# Patient Record
Sex: MALE | Race: WHITE
[De-identification: names, ages, dates, MRNs, and addresses within clinical notes are randomized per-mention and may not be internally consistent; named-entity substitution may affect disease eponyms.]

---

## 2017-02-28 ENCOUNTER — HOSPITAL ENCOUNTER (OUTPATIENT)
Dept: HOSPITAL 45 - C.RADPV | Age: 82
Discharge: HOME | End: 2017-02-28
Attending: NURSE PRACTITIONER
Payer: COMMERCIAL

## 2017-02-28 DIAGNOSIS — I51.7: ICD-10-CM

## 2017-02-28 DIAGNOSIS — R50.9: Primary | ICD-10-CM

## 2017-02-28 DIAGNOSIS — J90: ICD-10-CM

## 2017-02-28 NOTE — DIAGNOSTIC IMAGING REPORT
TWO VIEW CHEST



CLINICAL HISTORY: Fever.



FINDINGS: PA and lateral chest radiographs are compared to study dated 3/2/2009

and correlated to chest CT dated 9/22/2011. The PA view is degraded by patient

rotation. A tracheostomy has been removed as compared to 2009. The heart is

enlarged and there is atherosclerotic calcification of the thoracic aorta. The

pulmonary vasculature is noncongested.  Chronic interstitial thickening is

similar to previous. There are small pleural effusions and bibasilar airspace

opacities. There is no pneumothorax. The skeletal structures are osteopenic.

Degenerative change and hyperkyphosis are noted in the thoracic spine.



IMPRESSION:



1. Cardiomegaly without radiographic evidence of congestive failure.



2. Small pleural effusions with bibasilar airspace opacities. This could

represent atelectasis and/or an infectious/inflammatory pneumonitis. Clinical

correlation will be required.







Electronically signed by:  Agustin Sabillon M.D.

2/28/2017 1:55 PM



Dictated Date/Time:  2/28/2017 1:43 PM

## 2017-03-13 ENCOUNTER — HOSPITAL ENCOUNTER (INPATIENT)
Dept: HOSPITAL 45 - C.EDB | Age: 82
LOS: 3 days | Discharge: HOME | DRG: 291 | End: 2017-03-16
Attending: FAMILY MEDICINE | Admitting: HOSPITALIST
Payer: COMMERCIAL

## 2017-03-13 VITALS
HEIGHT: 67 IN | WEIGHT: 187.83 LBS | WEIGHT: 187.83 LBS | BODY MASS INDEX: 29.48 KG/M2 | BODY MASS INDEX: 29.48 KG/M2 | HEIGHT: 67 IN

## 2017-03-13 VITALS
TEMPERATURE: 98.6 F | DIASTOLIC BLOOD PRESSURE: 82 MMHG | SYSTOLIC BLOOD PRESSURE: 148 MMHG | HEART RATE: 59 BPM | OXYGEN SATURATION: 95 %

## 2017-03-13 VITALS — HEART RATE: 50 BPM | OXYGEN SATURATION: 91 %

## 2017-03-13 DIAGNOSIS — I27.2: ICD-10-CM

## 2017-03-13 DIAGNOSIS — Z91.19: ICD-10-CM

## 2017-03-13 DIAGNOSIS — N40.0: ICD-10-CM

## 2017-03-13 DIAGNOSIS — Z79.891: ICD-10-CM

## 2017-03-13 DIAGNOSIS — I48.2: ICD-10-CM

## 2017-03-13 DIAGNOSIS — Z86.19: ICD-10-CM

## 2017-03-13 DIAGNOSIS — I48.92: ICD-10-CM

## 2017-03-13 DIAGNOSIS — Z79.899: ICD-10-CM

## 2017-03-13 DIAGNOSIS — B35.4: ICD-10-CM

## 2017-03-13 DIAGNOSIS — I35.1: ICD-10-CM

## 2017-03-13 DIAGNOSIS — I31.3: ICD-10-CM

## 2017-03-13 DIAGNOSIS — J18.9: ICD-10-CM

## 2017-03-13 DIAGNOSIS — I24.8: ICD-10-CM

## 2017-03-13 DIAGNOSIS — I50.43: ICD-10-CM

## 2017-03-13 DIAGNOSIS — R00.1: ICD-10-CM

## 2017-03-13 DIAGNOSIS — I11.0: Primary | ICD-10-CM

## 2017-03-13 DIAGNOSIS — Q21.1: ICD-10-CM

## 2017-03-13 LAB
ALBUMIN/GLOB SERPL: 1.2 {RATIO} (ref 0.9–2)
ALP SERPL-CCNC: 61 U/L (ref 45–117)
ALT SERPL-CCNC: 42 U/L (ref 12–78)
ANION GAP SERPL CALC-SCNC: 8 MMOL/L (ref 3–11)
APPEARANCE UR: CLEAR
AST SERPL-CCNC: 24 U/L (ref 15–37)
BASOPHILS # BLD: 0.02 K/UL (ref 0–0.2)
BASOPHILS NFR BLD: 0.2 %
BILIRUB UR-MCNC: (no result) MG/DL
BUN SERPL-MCNC: 22 MG/DL (ref 7–18)
BUN/CREAT SERPL: 21.9 (ref 10–20)
CALCIUM SERPL-MCNC: 9.7 MG/DL (ref 8.5–10.1)
CHLORIDE SERPL-SCNC: 104 MMOL/L (ref 98–107)
CKMB/CK RATIO: 7.6 (ref 0–3)
CO2 SERPL-SCNC: 27 MMOL/L (ref 21–32)
COLOR UR: YELLOW
COMPLETE: YES
CREAT CL PREDICTED SERPL C-G-VRATE: 55.3 ML/MIN
CREAT SERPL-MCNC: 1 MG/DL (ref 0.6–1.4)
EOSINOPHIL NFR BLD AUTO: 139 K/UL (ref 130–400)
FLUAV RNA SPEC QL NAA+PROBE: (no result)
FLUBV RNA SPEC QL NAA+PROBE: (no result)
GLOBULIN SER-MCNC: 2.7 GM/DL (ref 2.5–4)
GLUCOSE SERPL-MCNC: 109 MG/DL (ref 70–99)
HCT VFR BLD CALC: 46.1 % (ref 42–52)
IG%: 0.2 %
IMM GRANULOCYTES NFR BLD AUTO: 16.4 %
INR PPP: 1.1 (ref 0.9–1.1)
INR PPP: 1.1 (ref 0.9–1.1)
LYMPHOCYTES # BLD: 1.6 K/UL (ref 1.2–3.4)
MANUAL MICROSCOPIC REQUIRED?: NO
MCH RBC QN AUTO: 30.5 PG (ref 25–34)
MCHC RBC AUTO-ENTMCNC: 34.1 G/DL (ref 32–36)
MCV RBC AUTO: 89.5 FL (ref 80–100)
MONOCYTES NFR BLD: 9.2 %
NEUTROPHILS # BLD AUTO: 0.4 %
NEUTROPHILS NFR BLD AUTO: 73.6 %
NITRITE UR QL STRIP: (no result)
PARTIAL THROMBOPLASTIN RATIO: 1
PH UR STRIP: 5.5 [PH] (ref 4.5–7.5)
PMV BLD AUTO: 10.4 FL (ref 7.4–10.4)
POTASSIUM SERPL-SCNC: 4.1 MMOL/L (ref 3.5–5.1)
PROTHROMBIN TIME: 11.6 SECONDS (ref 9–12)
PROTHROMBIN TIME: 11.9 SECONDS (ref 9–12)
RBC # BLD AUTO: 5.15 M/UL (ref 4.7–6.1)
REVIEW REQ?: NO
SODIUM SERPL-SCNC: 139 MMOL/L (ref 136–145)
SP GR UR STRIP: 1.01 (ref 1–1.03)
URINE BILL WITH OR WITHOUT MIC: (no result)
URINE EPITHELIAL CELL AUTO: (no result) /LPF (ref 0–5)
UROBILINOGEN UR-MCNC: (no result) MG/DL
WBC # BLD AUTO: 9.77 K/UL (ref 4.8–10.8)

## 2017-03-13 RX ADMIN — HEPARIN SODIUM SCH UNIT: 10000 INJECTION, SOLUTION INTRAVENOUS; SUBCUTANEOUS at 23:12

## 2017-03-13 RX ADMIN — FLUCONAZOLE SCH MLS/HR: 200 INJECTION, SOLUTION INTRAVENOUS at 21:36

## 2017-03-13 NOTE — HISTORY AND PHYSICAL
History & Physical


Date & Time of Service:


Mar 13, 2017 at 18:22


Chief Complaint:


Trouble Breathing


Primary Care Physician:


Liliana Talavera M.D.


History of Present Illness


This is an 86-year-old male with PMHx of recent GI bleed, alcohol use, 

diastolic CHF, patent atrial foramen, diverticulosis, hypertension, BPH, AK I, 

recent influenza on last admission approximately 2 weeks ago.  The patient 

presents with shortness of breath that has been worsening over the last 2 

weeks.  He reports that he has a cough, productive with clear sputum.  He has 

been unable to walk more than 10 feet without getting extremely short of 

breath.  Patient reports swelling in his lower legs has gotten increasingly 

worse in past 2 weeks as well.  The patient has been taking Lasix 20 mg daily, 

or has taken an extra 10 mg total 30 mg a day if he notices swelling has gotten 

worse.  The patient reports taking only 20 mg today.  He denies any changes in 

his eating or drinking habits.  He does not normally wear any oxygen at baseline

, but just finished a nebulizer treatment. The patient does complain of a 

headache, and had an episode of lightheadedness and dizziness earlier today but 

denies lightheadedness or dizziness currently. 





Here in the ED the patient has a blood pressure 120/56, bradycardic with heart 

rate of 49-59, an elevated proBNP= 3462, troponin I = 0.250, albumin 3.2.  


CXR was reviewed and has small bilateral pleural effusions, hazy bibasilar 

finding likely atelectasis. 


EKG with A. fib, slow ventricular response with PVCs, incomplete RBBB, old 

inferior anterior infarct.





Past Medical/Surgical History


Medical Problems:


(1) Atrial Fibrillation


Status: Chronic  





(2) Congestive Heart Failure Nos


Status: Resolved  





(3) Diverticulosis Colon (W/O Ment Of Hemorrhage)


Status: Chronic  





(4) Hyperlipidemia Nec/Nos


Status: Chronic  





(5) Hypertension Nos


Status: Chronic  





(6) Peritonitis Nos


Status: Resolved  





(7) Pseudomonal Pneumonia


Status: Resolved  





(8) Sepsis


Status: Resolved  





(9) Viral Encephalitis Nos


Status: Resolved  








Family History





Insignificant due to advanced age 





Social History


Smoking Status:  Never Smoker


Smokeless Tobacco Use:  No


Alcohol Use:  occasionally (whiskey)


Drug Use:  none


Marital Status:  


Housing status:  lives with family


Occupational Status:  retired





Immunizations


History of Influenza Vaccine:  No


History of Tetanus Vaccine?:  Unknown


History of Pneumococcal:  Yes


History of Hepatitis B Vaccine:  Unknown





Multi-Drug Resistant Organisms


History of MDRO:  No





Allergies


Coded Allergies:  


     No Known Allergies (Verified , 3/13/17)





Home Medications


Scheduled


Cholecalciferol (Vitamin D3), 400 INTUNIT PO HS


Docusate Sodium (Docusate Sodium), 1 CAP PO BID


Finasteride (Finasteride), 5 MG PO QAM


Furosemide (Lasix), 40 MG PO DAILY


Lisinopril (Zestril), 10 MG PO BID


Metoprolol Succ (Toprol Xl) (Toprol-Xl), 25 MG PO QAM


Ocuvite Preservision (Ocuvite Preservision), 1 TAB PO BID


Senna (Senokot), 1 TAB PO BID


Tamsulosin Hcl (Flomax), 0.4 MG PO HS





Scheduled PRN


Tramadol (Ultram), 50 MG PO Q6H PRN for Pain





Review of Systems


Constitutional:  + fatigue, + weakness, No chills, No fever, No sweats, No 

weight loss


Eyes:  No problem reported


Respiratory:  + cough, + dyspnea on exertion, + shortness of breath, No dyspnea 

at rest


Cardiovascular:  No chest pain, No orthopnea, No palpitations


Abdomen:  No GI bleeding, No constipation, No diarrhea, No nausea, No pain, No 

vomiting


Musculoskeletal:  + swelling, No calf pain, No joint pain


Genitourinary - Male:  No dysuria, No hematuria


Neurologic:  + weakness, No balance problems, No numbness/tingling


Endocrine:  + fatigue


Integumentary:  + itch, + rash (anterior left chest wall)





Physical Exam


Vital Signs











  Date Time  Temp Pulse Resp B/P Pulse Ox O2 Delivery O2 Flow Rate FiO2


 


3/13/17 17:40  59 19 123/59 97 Room Air  


 


3/13/17 16:58  50 16  91 Room Air  


 


3/13/17 16:53  49 16 120/56 93   


 


3/13/17 16:51     93 Room Air  


 


3/13/17 16:51     93 Room Air  


 


3/13/17 16:47  53      


 


3/13/17 16:17      Room Air  93


 


3/13/17 15:55 36.6 52 24 117/62 91 Room Air  








General Appearance:  WD/WN, no apparent distress, + obese


Head:  normocephalic, atraumatic


Eyes:  PERRL, EOMI


ENT:  hearing grossly normal, pharynx normal


Neck:  supple


Respiratory/Chest:  chest non-tender, no respiratory distress, no accessory 

muscle use, + decreased breath sounds (at bases bilaterally), + pertinent 

finding (left anterior chest wall with rash, central clearing, slightly raised 

red border, erythematous circular-like pattern.)


Cardiovascular:  regular rate, rhythm, no murmur, normal peripheral pulses, + 

JVD (mild)


Abdomen/GI:  normal bowel sounds, soft, no organomegaly, + pertinent finding (+

tympany)


Back:  normal inspection, no CVA tenderness


Extremities/Musculoskelatal:  no calf tenderness, + pedal edema (2+ pitting 

edema up to knees bilaterally)


Neurologic/Psych:  alert, normal reflexes, oriented x 3


Skin:  normal color, warm/dry


Lymphatic:  no adenopathy





Diagnostics


Laboratory Results





Results Past 24 Hours








Test


  3/13/17


16:45 3/13/17


16:47 Range/Units


 


 


White Blood Count 9.77  4.8-10.8  K/uL


 


Red Blood Count 5.15  4.7-6.1  M/uL


 


Hemoglobin 15.7  14.0-18.0  g/dL


 


Hematocrit 46.1  42-52  %


 


Mean Corpuscular Volume 89.5    fL


 


Mean Corpuscular Hemoglobin 30.5  25-34  pg


 


Mean Corpuscular Hemoglobin


Concent 34.1


  


  32-36  g/dl


 


 


Platelet Count 139  130-400  K/uL


 


Mean Platelet Volume 10.4  7.4-10.4  fL


 


Neutrophils (%) (Auto) 73.6   %


 


Lymphocytes (%) (Auto) 16.4   %


 


Monocytes (%) (Auto) 9.2   %


 


Eosinophils (%) (Auto) 0.4   %


 


Basophils (%) (Auto) 0.2   %


 


Neutrophils # (Auto) 7.19  1.4-6.5  K/uL


 


Lymphocytes # (Auto) 1.60  1.2-3.4  K/uL


 


Monocytes # (Auto) 0.90  0.11-0.59  K/uL


 


Eosinophils # (Auto) 0.04  0-0.5  K/uL


 


Basophils # (Auto) 0.02  0-0.2  K/uL


 


RDW Standard Deviation 48.6  36.4-46.3  fL


 


RDW Coefficient of Variation 14.9  11.5-14.5  %


 


Immature Granulocyte % (Auto) 0.2   %


 


Immature Granulocyte # (Auto) 0.02  0.00-0.02  K/uL


 


Sodium Level 139  136-145  mmol/L


 


Potassium Level 4.1  3.5-5.1  mmol/L


 


Chloride Level 104    mmol/L


 


Carbon Dioxide Level 27  21-32  mmol/L


 


Anion Gap 8.0  3-11  mmol/L


 


Blood Urea Nitrogen 22  7-18  mg/dl


 


Creatinine


  1.00


  


  0.60-1.40


mg/dl


 


Est Creatinine Clear Calc


Drug Dose 55.3


  


   ml/min


 


 


Estimated GFR (


American) 78.6


  


   


 


 


Estimated GFR (Non-


American 67.8


  


   


 


 


BUN/Creatinine Ratio 21.9  10-20  


 


Random Glucose 109  70-99  mg/dl


 


Calcium Level 9.7  8.5-10.1  mg/dl


 


Total Bilirubin 1.5  0.2-1  mg/dl


 


Aspartate Amino Transf


(AST/SGOT) 24


  


  15-37  U/L


 


 


Alanine Aminotransferase


(ALT/SGPT) 42


  


  12-78  U/L


 


 


Alkaline Phosphatase 61    U/L


 


Total Creatine Kinase 49    U/L


 


Creatine Kinase MB 3.7  0.5-3.6  ng/ml


 


Creatine Kinase MB Ratio 7.6  0-3.0  


 


Troponin I 0.250  0-0.045  ng/ml


 


Pro-B-Type Natriuretic Peptide 3462  0-1800  pg/ml


 


Total Protein 5.9  6.4-8.2  gm/dl


 


Albumin 3.2  3.4-5.0  gm/dl


 


Globulin 2.7  2.5-4.0  gm/dl


 


Albumin/Globulin Ratio 1.2  0.9-2  











Diagnostic Radiology


CHEST ONE VIEW PORTABLE





CLINICAL HISTORY: Shortness of breath.    





COMPARISON STUDY:  Chest radiograph February 28, 2017 and chest CT September 22, 2011.





FINDINGS: There is no pneumothorax. Moderate enlargement of the cardiac


silhouette is noted. There may be small bilateral pleural effusions. Linear


bibasilar opacities favor atelectasis. There is hazy left basilar opacity. 





IMPRESSION:  





1. Moderate enlargement of the cardiac silhouette. No evidence of pulmonary


edema.





2. Suspected small bilateral pleural effusions and linear and hazy bibasilar


opacities which favor atelectasis.











Electronically signed by:  Edward Zaragoza M.D.


3/13/2017 5:13 PM





Dictated Date/Time:  3/13/2017 5:11 PM





 The status of this report is Signed.





EKG


Vent. rate 56 BPM


PA interval * ms


QRS duration 98 ms


QT/QTc 418/403 ms


P-R-T axes * -30 146


Atrial fibrillation with slow ventricular response, with PVCs, incomplete RBBB, 

previous inferior anterior infarct





Impression


Assessment and Plan


This is an 86-year-old male with PMHx of recent GI bleed, alcohol use, 

diastolic CHF, patent atrial foramen, diverticulosis, hypertension, BPH, AK I, 

recent influenza on last admission approximately 2 weeks ago.  The patient 

presents with shortness of breath that has been worsening over the last 2 weeks.





Hx Diastolic CHF, volume overload


-  Admit to telemetry


- will administer Lasix 40 mg IV once, and then daily


- BNP elevated 3462


- Troponin I= 0.250, will trend 2 more: Patient without chest pain or 

tightness. 


- Pedal edema bilaterally, +2 pitting, 


- Chest x-ray with small bilateral pleural effusion, can repeat chest x-ray 

tomorrow if necessary


- Recheck 2-D echo, last was completed in 2011 with normal EF





Cough


- Checking serologies to rule out influenza


- Start on ceftriaxone IV 1 g now with increased shortness of breath, and 

productive sputum


- Chest x-ray reviewed as above





Tinea corpus over left anterior chest wall


- start on fluconazole 200 mg IV daily


- Continue use clotrimazole lotion topically once discharged





Hypertension


- Hold lisinopril and metoprolol as patient is bradycardic in the ER, heart rate

= 49-59


- Restart when medically stable





BPH


-Continue on Flomax 0.4 mg





DVT ppx: Payam,  SCDs





CODE STATUS: Full code





Disposition: Patient from home, we'll discharge her medically stable, likely 

within 2-3 days after diuresis





Level of Care


Telemetry





Resuscitation Status


FULL RESUSCITATION





VTE Prophylaxis


VTE Risk Assessment Done? Y/N:  Yes


Risk Level:  Low


Given or contraindicated:  T.E.D. Stockings, SCD's





Assessment and Plan


Attending Addendum: X





I have physically seen and examined this patient, have directed their medical 

care, have supervised the PA's activity, and agree with the H&P as noted above, 

with the following changes: 





The patient is awake, alert and oriented 3, normocephalic and atraumatic, 

lying in bed and in no acute distress.


HEENT--PERRL, EOMI, mucous membranes  and oropharynx dry.


Neck--supple, no JVD or bruits, thyroid normal, trachea midline, no adenopathy.


Heart--normal S1 and S2, no extra beats, no murmurs, rubs or gallops.


Lungs--clear bilaterally with good air movement, no respiratory distress, no 

accessory muscle use.


Abdomen--normal bowel sounds and soft, nontender and nondistended, no hernias 

or masses,  no organomegaly.


Extremities--no cyanosis, clubbing.there is bilaterally 2+ pitting edema. There 

are good distal pulses b/l.


Dermatologic--diffuse tinea rash over left shoulder, clavicular area and left 

side of chest.


Neurologic--cranial nerves II through XII grossly intact, motor and sensory 

examination normal.


Rheumatologic--normal range of motion, nontender, muscles and joints.


Psychiatric--normal affect.





Assessment and Plan:





Diastolic CHF/hypertension--admitted to telemetry for serial cardiac enzymes, 

cardiac rhythm monitoring and a 2-D echocardiogram with Dopplers.  Initial 

troponin is mildly elevated at 0.250 with no acute EKG changes.  We'll give 

Lasix 40 mg IV 1 now, and then every morning.  Hold metoprolol due to 

bradycardia, hold both metoprolol and lisinopril to 2 low blood pressure.





Cough--may have begun as a viral process, with secondary bacterial infection.  

We'll start ceftriaxone 1 g IV daily.





Tinea corporis--start fluconazole 200 mg IV daily.





BPH continue Flomax 0.4 mg by mouth at bedtime, monitor for signs of 

orthostasis.

## 2017-03-13 NOTE — DIAGNOSTIC IMAGING REPORT
CHEST ONE VIEW PORTABLE



CLINICAL HISTORY: Shortness of breath.    



COMPARISON STUDY:  Chest radiograph February 28, 2017 and chest CT September 22, 2011.



FINDINGS: There is no pneumothorax. Moderate enlargement of the cardiac

silhouette is noted. There may be small bilateral pleural effusions. Linear

bibasilar opacities favor atelectasis. There is hazy left basilar opacity. 



IMPRESSION:  



1. Moderate enlargement of the cardiac silhouette. No evidence of pulmonary

edema.



2. Suspected small bilateral pleural effusions and linear and hazy bibasilar

opacities which favor atelectasis.







Electronically signed by:  Edward Zaragoza M.D.

3/13/2017 5:13 PM



Dictated Date/Time:  3/13/2017 5:11 PM

## 2017-03-13 NOTE — EMERGENCY ROOM VISIT NOTE
History


Report prepared by Gely:  Stephanie Houston


Under the Supervision of:  Dr. Keven Saxena M.D.


First contact with patient:  16:04


Chief Complaint:  RESPIRATORY PROBLEMS


Stated Complaint:  TROUBLE BREATHING





History of Present Illness


The patient is a 86 year old male who presents to the Emergency Room with 

complaints of worsening respiratory problems that started 2 weeks ago. The 

patient states that he is more short of breath when he lies flat. He is 

experiencing shortness of breath and a productive cough. Additionally, he is 

experiencing intermittent dizziness. He denies fevers. The patient was seen at 

[facility] two weeks and tested positive for influenza. The patient was 

prescribed cefuroxime, prednisone, albuterol, and cherry cough suppressant on 

February 28. He states that he finished his prescriptions. Per the patient's 

daughter, the patient is more weak than usual although he is typically weak. 

The patient is also experiencing bilateral lower extremity edema. He states 

that he is on Lasix, which he takes whenever his legs swell up.





   Source of History:  patient, family (daughter)


   Onset:  2 weeks ago


   Position:  chest


   Quality:  other (respiratory problems)


   Timing:  worsening


   Modifying Factors (Worsening):  other (lying flat)


   Associated Symptoms:  No fevers


Note:


dizziness, lower extremity edema





Review of Systems


See HPI for pertinent positives & negatives. A total of 10 systems reviewed and 

were otherwise negative.





Past Medical & Surgical


Medical Problems:


(1) Atrial Fibrillation


(2) Congestive Heart Failure Nos


(3) Diverticulosis Colon (W/O Ment Of Hemorrhage)


(4) GI bleed


(5) Hyperlipidemia Nec/Nos


(6) Hypertension Nos


(7) Peritonitis Nos


(8) Pseudomonal Pneumonia


(9) Sepsis


(10) Shortness of breath


(11) Viral Encephalitis Nos








Family History





Insignificant due to advanced age 





Social History


Smoking Status:  Never Smoker


Alcohol Use:  none


Drug Use:  none


Marital Status:  


Housing Status:  lives with significant other


Occupation Status:  retired





Current/Historical Medications


Scheduled


Cholecalciferol (Vitamin D3), 400 INTUNIT PO HS


Docusate Sodium (Docusate Sodium), 1 CAP PO BID


Finasteride (Finasteride), 5 MG PO QAM


Furosemide (Lasix), 40 MG PO DAILY


Lisinopril (Zestril), 10 MG PO BID


Metoprolol Succ (Toprol Xl) (Toprol-Xl), 25 MG PO QAM


Ocuvite Preservision (Ocuvite Preservision), 1 TAB PO BID


Senna (Senokot), 1 TAB PO BID


Tamsulosin Hcl (Flomax), 0.4 MG PO HS





Scheduled PRN


Tramadol (Ultram), 50 MG PO Q6H PRN for Pain





Allergies


Coded Allergies:  


     No Known Allergies (Verified , 3/13/17)





Physical Exam


Vital Signs











  Date Time  Temp Pulse Resp B/P Pulse Ox O2 Delivery O2 Flow Rate FiO2


 


3/13/17 17:40  59 19 123/59 97 Room Air  


 


3/13/17 16:58  50 16  91 Room Air  


 


3/13/17 16:53  49 16 120/56 93   


 


3/13/17 16:51     93 Room Air  


 


3/13/17 16:51     93 Room Air  


 


3/13/17 16:47  53      


 


3/13/17 16:17      Room Air  93


 


3/13/17 15:55 36.6 52 24 117/62 91 Room Air  











Physical Exam


GENERAL: Patient is a healthy-appearing well-nourished older male


HEAD: Normocephalic atraumatic


EYES: Ocular movements intact pupils equal and react to light


OROPHARYNX mucous membranes are moist no exudates present no erythema or edema 

present


NECK: Supple no nuchal rigidity


CHEST: Good equal expansion


LUNGS: Distant breath sounds. Actively coughing on exam.


CARDIAC: Normal S1 and S2


ABDOMEN: Soft nontender no guarding


BACK: No CVA tenderness


EXTREMITIES: 1+ pitting edema going midway up the calf no pain upon palpation 

normal muscle strength in all groups no clubbing or cyanosis


NEURO: Patient is following commands is answering questions appropriately. 

Alert and oriented x3 Cranial Nerves 2-12 grossly intact





Medical Decision & Procedures


ER Provider


Diagnostic Interpretation:


X-ray results as stated below per interpretation by me and the radiologist: 





CHEST ONE VIEW PORTABLE





IMPRESSION:  





1. Moderate enlargement of the cardiac silhouette. No evidence of pulmonary


edema.





2. Suspected small bilateral pleural effusions and linear and hazy bibasilar


opacities which favor atelectasis.





Electronically signed by:  Edward Zaragoza M.D.


3/13/2017 5:13 PM





Dictated Date/Time:  3/13/2017 5:11 PM





Laboratory Results


3/13/17 16:45








Red Blood Count 5.15, Mean Corpuscular Volume 89.5, Mean Corpuscular Hemoglobin 

30.5, Mean Corpuscular Hemoglobin Concent 34.1, Mean Platelet Volume 10.4, 

Neutrophils (%) (Auto) 73.6, Lymphocytes (%) (Auto) 16.4, Monocytes (%) (Auto) 

9.2, Eosinophils (%) (Auto) 0.4, Basophils (%) (Auto) 0.2, Neutrophils # (Auto) 

7.19, Lymphocytes # (Auto) 1.60, Monocytes # (Auto) 0.90, Eosinophils # (Auto) 

0.04, Basophils # (Auto) 0.02





3/13/17 16:45

















Test


  3/13/17


16:45 3/13/17


16:47


 


White Blood Count


  9.77 K/uL


(4.8-10.8) 


 


 


Red Blood Count


  5.15 M/uL


(4.7-6.1) 


 


 


Hemoglobin


  15.7 g/dL


(14.0-18.0) 


 


 


Hematocrit 46.1 % (42-52)  


 


Mean Corpuscular Volume


  89.5 fL


() 


 


 


Mean Corpuscular Hemoglobin


  30.5 pg


(25-34) 


 


 


Mean Corpuscular Hemoglobin


Concent 34.1 g/dl


(32-36) 


 


 


Platelet Count


  139 K/uL


(130-400) 


 


 


Mean Platelet Volume


  10.4 fL


(7.4-10.4) 


 


 


Neutrophils (%) (Auto) 73.6 %  


 


Lymphocytes (%) (Auto) 16.4 %  


 


Monocytes (%) (Auto) 9.2 %  


 


Eosinophils (%) (Auto) 0.4 %  


 


Basophils (%) (Auto) 0.2 %  


 


Neutrophils # (Auto)


  7.19 K/uL


(1.4-6.5) 


 


 


Lymphocytes # (Auto)


  1.60 K/uL


(1.2-3.4) 


 


 


Monocytes # (Auto)


  0.90 K/uL


(0.11-0.59) 


 


 


Eosinophils # (Auto)


  0.04 K/uL


(0-0.5) 


 


 


Basophils # (Auto)


  0.02 K/uL


(0-0.2) 


 


 


RDW Standard Deviation


  48.6 fL


(36.4-46.3) 


 


 


RDW Coefficient of Variation


  14.9 %


(11.5-14.5) 


 


 


Immature Granulocyte % (Auto) 0.2 %  


 


Immature Granulocyte # (Auto)


  0.02 K/uL


(0.00-0.02) 


 


 


Anion Gap


  8.0 mmol/L


(3-11) 


 


 


Est Creatinine Clear Calc


Drug Dose 55.3 ml/min 


  


 


 


Estimated GFR (


American) 78.6 


  


 


 


Estimated GFR (Non-


American 67.8 


  


 


 


BUN/Creatinine Ratio 21.9 (10-20)  


 


Calcium Level


  9.7 mg/dl


(8.5-10.1) 


 


 


Total Bilirubin


  1.5 mg/dl


(0.2-1) 


 


 


Aspartate Amino Transf


(AST/SGOT) 24 U/L (15-37) 


  


 


 


Alanine Aminotransferase


(ALT/SGPT) 42 U/L (12-78) 


  


 


 


Alkaline Phosphatase


  61 U/L


() 


 


 


Total Creatine Kinase


  49 U/L


() 


 


 


Creatine Kinase MB


  3.7 ng/ml


(0.5-3.6) 


 


 


Creatine Kinase MB Ratio 7.6 (0-3.0)  


 


Pro-B-Type Natriuretic Peptide


  3462 pg/ml


(0-1800) 


 


 


Total Protein


  5.9 gm/dl


(6.4-8.2) 


 


 


Albumin


  3.2 gm/dl


(3.4-5.0) 


 


 


Globulin


  2.7 gm/dl


(2.5-4.0) 


 


 


Albumin/Globulin Ratio 1.2 (0.9-2)  


 


Influenza Type A (RT-PCR)


  


  Neg for Influ


A (NEG)


 


Influenza Type A Antigen


  


  Neg for Influ


A (NEG)


 


Influenza Type B Antigen


  


  Neg for Influ


B (NEG)


 


Influenza Type B (RT-PCR)


  


  Neg for Influ


B (NEG)











Labs reviewed by ED physician.





Medications Administered











 Medications


  (Trade)  Dose


 Ordered  Sig/Rudy


 Route  Start Time


 Stop Time Status Last Admin


Dose Admin


 


 Albuterol/


 Ipratropium


  (Duoneb)  12 ml  ONE  ONCE


 INH  3/13/17 16:30


 3/13/17 16:31 DC 3/13/17 16:58


12 ML











ECG


Indication:  SOB/dyspnea


Rate (beats per minute):  56


Rhythm:  atrial fibrillation


Findings:  T-wave inversion (Lateral), no acute ischemic change, other (Old 

inferior and anterior infarct)


Comparison ECG Date:  05/31/2016


Change:  no significant change





ED Course


1612: Past medical records reviewed. The patient was evaluated in room B7. A 

complete history and physical examination was performed. 





1630: Ordered DuoNeb 12 ml INH





1728: Upon reexamination the patient is resting comfortably. I discussed 

results and treatment plan with the patient. He verbalizes agreement and 

understanding. The patient will be evaluated for further management. 





1733: I discussed the patient's case with Dr. Gama OTERO, he has 

agreed to evaluate the patient for further management and care.





Medical Decision


Differential diagnosis:


Etiologies such as infections, reactive airway disease, pneumonia, pneumothorax

, COPD, CHF, cardiac ischemia, pulmonary embolism, musculoskeletal, 

gastrointestinal, as well as others were entertained.





This is an 86-year-old male who presents emergency department complaining of 

shortness of breath.  The patient has an elevation in his both his troponin as 

well as his BNP.  He was given Lasix here in the emergency department as well 

as an hour-long breathing treatment.  I did discuss the case with the 

hospitalist service who agreed to admit the patient.  Patient family were in 

agreement with the treatment plan.





Consults


Time Called:  1726


Consulting Physician:  Dr. Sharonda OTERO


Returned Call:  [phone]


I discussed the patient's case with Dr. Gama OTERO, he has agreed to 

evaluate the patient for further management and care.





Impression





 Primary Impression:  


 CHF exacerbation





Scribe Attestation


The scribe's documentation has been prepared under my direction and personally 

reviewed by me in its entirety. I confirm that the note above accurately 

reflects all work, treatment, procedures, and medical decision making performed 

by me.





Departure Information


Dispostion


Transfer Acute Care Facility





Referrals


Liliana Talavera M.D. (PCP)





Patient Instructions


My [facility]





Problem Qualifiers








 Primary Impression:  


 CHF exacerbation


 Congestive heart failure type:  unspecified congestive heart failure type  

Qualified Codes:  I50.9 - Heart failure, unspecified

## 2017-03-14 VITALS
HEART RATE: 58 BPM | TEMPERATURE: 97.88 F | SYSTOLIC BLOOD PRESSURE: 101 MMHG | DIASTOLIC BLOOD PRESSURE: 63 MMHG | OXYGEN SATURATION: 93 %

## 2017-03-14 VITALS
SYSTOLIC BLOOD PRESSURE: 125 MMHG | DIASTOLIC BLOOD PRESSURE: 72 MMHG | HEART RATE: 60 BPM | TEMPERATURE: 97.52 F | OXYGEN SATURATION: 92 %

## 2017-03-14 VITALS
SYSTOLIC BLOOD PRESSURE: 136 MMHG | TEMPERATURE: 98.06 F | OXYGEN SATURATION: 93 % | HEART RATE: 52 BPM | DIASTOLIC BLOOD PRESSURE: 81 MMHG

## 2017-03-14 VITALS
OXYGEN SATURATION: 96 % | TEMPERATURE: 97.7 F | DIASTOLIC BLOOD PRESSURE: 64 MMHG | HEART RATE: 57 BPM | SYSTOLIC BLOOD PRESSURE: 124 MMHG

## 2017-03-14 VITALS
SYSTOLIC BLOOD PRESSURE: 133 MMHG | HEART RATE: 81 BPM | OXYGEN SATURATION: 93 % | TEMPERATURE: 97.34 F | DIASTOLIC BLOOD PRESSURE: 66 MMHG

## 2017-03-14 VITALS
HEART RATE: 57 BPM | SYSTOLIC BLOOD PRESSURE: 122 MMHG | DIASTOLIC BLOOD PRESSURE: 65 MMHG | TEMPERATURE: 98.24 F | OXYGEN SATURATION: 92 %

## 2017-03-14 VITALS — HEART RATE: 57 BPM | OXYGEN SATURATION: 92 %

## 2017-03-14 VITALS — OXYGEN SATURATION: 95 %

## 2017-03-14 VITALS — HEART RATE: 57 BPM | OXYGEN SATURATION: 93 %

## 2017-03-14 LAB
ANION GAP SERPL CALC-SCNC: 8 MMOL/L (ref 3–11)
BASOPHILS # BLD: 0.01 K/UL (ref 0–0.2)
BASOPHILS NFR BLD: 0.1 %
BUN SERPL-MCNC: 17 MG/DL (ref 7–18)
BUN/CREAT SERPL: 18.8 (ref 10–20)
CALCIUM SERPL-MCNC: 9.9 MG/DL (ref 8.5–10.1)
CHLORIDE SERPL-SCNC: 102 MMOL/L (ref 98–107)
CO2 SERPL-SCNC: 29 MMOL/L (ref 21–32)
COMPLETE: YES
CREAT CL PREDICTED SERPL C-G-VRATE: 61 ML/MIN
CREAT SERPL-MCNC: 0.91 MG/DL (ref 0.6–1.4)
EOSINOPHIL NFR BLD AUTO: 114 K/UL (ref 130–400)
GLUCOSE SERPL-MCNC: 107 MG/DL (ref 70–99)
HCT VFR BLD CALC: 44.9 % (ref 42–52)
IG%: 0.2 %
IMM GRANULOCYTES NFR BLD AUTO: 21.8 %
LYMPHOCYTES # BLD: 1.91 K/UL (ref 1.2–3.4)
MCH RBC QN AUTO: 30.2 PG (ref 25–34)
MCHC RBC AUTO-ENTMCNC: 33.4 G/DL (ref 32–36)
MCV RBC AUTO: 90.5 FL (ref 80–100)
MONOCYTES NFR BLD: 7.1 %
NEUTROPHILS # BLD AUTO: 0.5 %
NEUTROPHILS NFR BLD AUTO: 70.3 %
PMV BLD AUTO: 10.4 FL (ref 7.4–10.4)
POTASSIUM SERPL-SCNC: 3.9 MMOL/L (ref 3.5–5.1)
RBC # BLD AUTO: 4.96 M/UL (ref 4.7–6.1)
SODIUM SERPL-SCNC: 139 MMOL/L (ref 136–145)
WBC # BLD AUTO: 8.76 K/UL (ref 4.8–10.8)

## 2017-03-14 RX ADMIN — IPRATROPIUM BROMIDE AND ALBUTEROL SULFATE SCH ML: .5; 3 SOLUTION RESPIRATORY (INHALATION) at 19:00

## 2017-03-14 RX ADMIN — FLUCONAZOLE SCH MLS/HR: 200 INJECTION, SOLUTION INTRAVENOUS at 21:25

## 2017-03-14 RX ADMIN — IPRATROPIUM BROMIDE AND ALBUTEROL SULFATE SCH ML: .5; 3 SOLUTION RESPIRATORY (INHALATION) at 11:09

## 2017-03-14 RX ADMIN — IPRATROPIUM BROMIDE AND ALBUTEROL SULFATE SCH ML: .5; 3 SOLUTION RESPIRATORY (INHALATION) at 15:42

## 2017-03-14 RX ADMIN — HEPARIN SODIUM SCH UNIT: 10000 INJECTION, SOLUTION INTRAVENOUS; SUBCUTANEOUS at 20:09

## 2017-03-14 RX ADMIN — GUAIFENESIN SCH MG: 600 TABLET, EXTENDED RELEASE ORAL at 20:06

## 2017-03-14 RX ADMIN — CEFTRIAXONE SODIUM SCH MLS/HR: 1 INJECTION, POWDER, FOR SOLUTION INTRAVENOUS at 20:06

## 2017-03-14 RX ADMIN — FUROSEMIDE SCH MLS/MIN: 10 INJECTION, SOLUTION INTRAMUSCULAR; INTRAVENOUS at 07:49

## 2017-03-14 RX ADMIN — HEPARIN SODIUM SCH UNIT: 10000 INJECTION, SOLUTION INTRAVENOUS; SUBCUTANEOUS at 21:26

## 2017-03-14 RX ADMIN — HEPARIN SODIUM SCH UNIT: 10000 INJECTION, SOLUTION INTRAVENOUS; SUBCUTANEOUS at 05:31

## 2017-03-14 RX ADMIN — HEPARIN SODIUM SCH UNIT: 10000 INJECTION, SOLUTION INTRAVENOUS; SUBCUTANEOUS at 13:09

## 2017-03-14 NOTE — HOSPITALIST PROGRESS NOTE
Hospitalist Progress Note


Date of Service


Mar 14, 2017.


 (Alexandra Phelps ., PA-C)





Subjective


Pt evaluation today including:  conversation w/ patient, physical exam, chart 

review, lab review, review of studies, review of inpatient medication list


Pain:  None


PO Intake:  Tolerating PO diet


Voiding:  no voiding problems


Patient reports feeling the same as yesterday.  He states that he still has a 

productive cough with clear mucus.  His shortness of breath has remained the 

same.  He complains of orthopnea and GARCIA.  He is eating well and urinating 

without difficulty.  The patient denies fevers, chills, sweats, chest pain, 

palpitations, claudication, wheezing, nausea, vomiting, abdominal pain, dysuria

, hematuria, urinary retention, paralysis, weakness, numbness and tingling.





   Additional Comments:


See HPI for pertinent positives and negatives.  All other systems reviewed and 

negative.


 (Alexandra Phelps ., PA-C)





Objective


Vital Signs











  Date Time  Temp Pulse Resp B/P Pulse Ox O2 Delivery O2 Flow Rate FiO2


 


3/14/17 12:00      Room Air  


 


3/14/17 11:47 36.4 60 24 125/72 92 Room Air  


 


3/14/17 11:10  57 16  92 Room Air  


 


3/14/17 11:00      Room Air  


 


3/14/17 08:00      Room Air  


 


3/14/17 07:39 36.7 52 9 136/81 93 Room Air  


 


3/14/17 05:33     95 Room Air  


 


3/14/17 04:22 36.8 57 20 122/65 92 Nasal Cannula 4.0 


 


3/14/17 04:00      Nasal Cannula 4.0 


 


3/14/17 00:27 36.5 57 19 124/64 96 Nasal Cannula 4.0 


 


3/14/17 00:00      Nasal Cannula 4.0 


 


3/13/17 21:16 37.0 59 20 148/82 95 Nasal Cannula 4.0 


 


3/13/17 19:42  71 19 124/58 92 Nasal Cannula 3.0 


 


3/13/17 19:12  69 18 133/58 92 Nasal Cannula 2.0 


 


3/13/17 19:00     87 Room Air  


 


3/13/17 17:40  59 19 123/59 97 Room Air  


 


3/13/17 16:58  50 16  91 Room Air  


 


3/13/17 16:53  49 16 120/56 93   


 


3/13/17 16:51     93 Room Air  


 


3/13/17 16:51     93 Room Air  


 


3/13/17 16:47  53      


 


3/13/17 16:17      Room Air  93


 


3/13/17 15:55 36.6 52 24 117/62 91 Room Air  








 (Alexandra Phelps ., PA-C)





Physical Exam


General Appearance:  WD/WN, no apparent distress


Eyes:  normal inspection, PERRL, EOMI


ENT:  normal ENT inspection, hearing grossly normal, pharynx normal


Neck:  supple, trachea midline, + JVD


Respiratory/Chest:  normal breath sounds, no respiratory distress, + decreased 

breath sounds (diminished throughout R>L, very little air movement)


Cardiovascular:  no gallop, no murmur, + bradycardia, + irregularly irregular


Abdomen:  normal bowel sounds, non tender, soft


Extremities:  non-tender, normal inspection, + pedal edema (1+ pitting)


Neurologic/Psychiatric:  alert, normal mood/affect, oriented x 3


Skin:  normal color, warm/dry, + rash (tinea corpus on inner thighs, right flank

, lower abdomen, left anterior chest to left shoulder)


 (Alexandra Phelps ., PA-C)





Laboratory Results





Last 24 Hours








Test


  3/13/17


16:45 3/13/17


16:47 3/13/17


22:15 3/13/17


22:16


 


White Blood Count 9.77 K/uL    


 


Red Blood Count 5.15 M/uL    


 


Hemoglobin 15.7 g/dL    


 


Hematocrit 46.1 %    


 


Mean Corpuscular Volume 89.5 fL    


 


Mean Corpuscular Hemoglobin 30.5 pg    


 


Mean Corpuscular Hemoglobin


Concent 34.1 g/dl 


  


  


  


 


 


Platelet Count 139 K/uL    


 


Mean Platelet Volume 10.4 fL    


 


Neutrophils (%) (Auto) 73.6 %    


 


Lymphocytes (%) (Auto) 16.4 %    


 


Monocytes (%) (Auto) 9.2 %    


 


Eosinophils (%) (Auto) 0.4 %    


 


Basophils (%) (Auto) 0.2 %    


 


Neutrophils # (Auto) 7.19 K/uL    


 


Lymphocytes # (Auto) 1.60 K/uL    


 


Monocytes # (Auto) 0.90 K/uL    


 


Eosinophils # (Auto) 0.04 K/uL    


 


Basophils # (Auto) 0.02 K/uL    


 


RDW Standard Deviation 48.6 fL    


 


RDW Coefficient of Variation 14.9 %    


 


Immature Granulocyte % (Auto) 0.2 %    


 


Immature Granulocyte # (Auto) 0.02 K/uL    


 


Prothrombin Time 11.6 SECONDS    


 


Prothromb Time International


Ratio 1.1 


  


  


  


 


 


Sodium Level 139 mmol/L    


 


Potassium Level 4.1 mmol/L    


 


Chloride Level 104 mmol/L    


 


Carbon Dioxide Level 27 mmol/L    


 


Anion Gap 8.0 mmol/L    


 


Blood Urea Nitrogen 22 mg/dl    


 


Creatinine 1.00 mg/dl    


 


Est Creatinine Clear Calc


Drug Dose 55.3 ml/min 


  


  


  


 


 


Estimated GFR (


American) 78.6 


  


  


  


 


 


Estimated GFR (Non-


American 67.8 


  


  


  


 


 


BUN/Creatinine Ratio 21.9    


 


Random Glucose 109 mg/dl    


 


Calcium Level 9.7 mg/dl    


 


Total Bilirubin 1.5 mg/dl    


 


Aspartate Amino Transf


(AST/SGOT) 24 U/L 


  


  


  


 


 


Alanine Aminotransferase


(ALT/SGPT) 42 U/L 


  


  


  


 


 


Alkaline Phosphatase 61 U/L    


 


Total Creatine Kinase 49 U/L    


 


Creatine Kinase MB 3.7 ng/ml    


 


Creatine Kinase MB Ratio 7.6    


 


Troponin I 0.250 ng/ml   0.262 ng/ml  


 


Pro-B-Type Natriuretic Peptide 3462 pg/ml    


 


Total Protein 5.9 gm/dl    


 


Albumin 3.2 gm/dl    


 


Globulin 2.7 gm/dl    


 


Albumin/Globulin Ratio 1.2    


 


Influenza Type A (RT-PCR)


  


  Neg for Influ


A 


  


 


 


Influenza Type A Antigen


  


  Neg for Influ


A 


  


 


 


Influenza Type B Antigen


  


  Neg for Influ


B 


  


 


 


Influenza Type B (RT-PCR)


  


  Neg for Influ


B 


  


 


 


Urine Color    YELLOW 


 


Urine Appearance    CLEAR 


 


Urine pH    5.5 


 


Urine Specific Gravity    1.013 


 


Urine Protein    NEG 


 


Urine Glucose (UA)    NEG 


 


Urine Ketones    NEG 


 


Urine Occult Blood    NEG 


 


Urine Nitrite    NEG 


 


Urine Bilirubin    NEG 


 


Urine Urobilinogen    NEG 


 


Urine Leukocyte Esterase    TRACE 


 


Urine WBC (Auto)    1-5 /hpf 


 


Urine RBC (Auto)    0-4 /hpf 


 


Urine Hyaline Casts (Auto)    1-5 /lpf 


 


Urine Epithelial Cells (Auto)    5-10 /lpf 


 


Urine Bacteria (Auto)    NEG 














Test


  3/13/17


22:40 3/14/17


06:03 


  


 


 


Prothrombin Time 11.9 SECONDS    


 


Prothromb Time International


Ratio 1.1 


  


  


  


 


 


Activated Partial


Thromboplast Time 26.4 SECONDS 


  


  


  


 


 


Partial Thromboplastin Ratio 1.0    


 


White Blood Count  8.76 K/uL   


 


Red Blood Count  4.96 M/uL   


 


Hemoglobin  15.0 g/dL   


 


Hematocrit  44.9 %   


 


Mean Corpuscular Volume  90.5 fL   


 


Mean Corpuscular Hemoglobin  30.2 pg   


 


Mean Corpuscular Hemoglobin


Concent 


  33.4 g/dl 


  


  


 


 


Platelet Count  114 K/uL   


 


Mean Platelet Volume  10.4 fL   


 


Neutrophils (%) (Auto)  70.3 %   


 


Lymphocytes (%) (Auto)  21.8 %   


 


Monocytes (%) (Auto)  7.1 %   


 


Eosinophils (%) (Auto)  0.5 %   


 


Basophils (%) (Auto)  0.1 %   


 


Neutrophils # (Auto)  6.16 K/uL   


 


Lymphocytes # (Auto)  1.91 K/uL   


 


Monocytes # (Auto)  0.62 K/uL   


 


Eosinophils # (Auto)  0.04 K/uL   


 


Basophils # (Auto)  0.01 K/uL   


 


RDW Standard Deviation  49.7 fL   


 


RDW Coefficient of Variation  15.2 %   


 


Immature Granulocyte % (Auto)  0.2 %   


 


Immature Granulocyte # (Auto)  0.02 K/uL   


 


Sodium Level  139 mmol/L   


 


Potassium Level  3.9 mmol/L   


 


Chloride Level  102 mmol/L   


 


Carbon Dioxide Level  29 mmol/L   


 


Anion Gap  8.0 mmol/L   


 


Blood Urea Nitrogen  17 mg/dl   


 


Creatinine  0.91 mg/dl   


 


Est Creatinine Clear Calc


Drug Dose 


  61.0 ml/min 


  


  


 


 


Estimated GFR (


American) 


  88.1 


  


  


 


 


Estimated GFR (Non-


American 


  76.0 


  


  


 


 


BUN/Creatinine Ratio  18.8   


 


Random Glucose  107 mg/dl   


 


Calcium Level  9.9 mg/dl   


 


Troponin I  0.354 ng/ml   








 (Alexandra Phelps, PA-C)





Assessment and Plan


85 y/o male with a history of chronic diastolic CHF, recent influenza A, patent 

atrial foramen, diverticulosis, hypertension, BPH and h/o PUD with GI bleeding 

who presented to the ED with shortness of breath that has been worsening over 

the last 2 weeks.  CXR shows bilateral pleural effusions bibasilar opacities.  

Cardiac enzymes elevated upon arrival.





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


-Admit to telemetry


-Continue Lasix 40 mg IV qd


-BNP elevated 3462


-Echo:  Moderate LVH, right ventricular systolic function mildly reduced, L and 

R atria mildly dilated.  Mild to moderate aortic regurgitation.  Right 

ventricular systolic pressure elevated at 30-40 mmHg.  Moderate pericardial 

effusion


-Cardiology consulted, appreciate recs:  Pericardial effusion appears to be 

chronic as a similar effusion was noted on CT one year ago.  Monitor for now.  

Recommend starting anticoagulation due to A. fib.  Patient has history of PUD 

with GI bleeding, could clear with GI prior to starting AC.  Agree with holding 

beta blocker for now due to bradycardia.  Patient may have had a silent MI a 

few days ago, however, no wall motion abnormalities on echo.  Recommend cardiac 

perfusion imaging prior to discharge.  Agree with Lasix.





Presumed community acquired pneumonia--still c/o productive cough, SOB.  Little 

air movement, lungs diminished throughout


-Influenza negative.  Pertussis studies pending per ED


-Continue Rocephin 1 gm IV qd


-Add azithromycin 500 mg IV qd


-Chest x-ray reviewed as above


-Duonebs QIDR and q2h prn SOB/wheezing


-Mucinex 600 mg PO BID





A-fib with slow ventricular response--per patient records, patient was in a 

regular rhythm 2 weeks ago


-Monitored on telemetry.  Patient had a 2.3 second pause early this morning.  

Bradycardia as low as 36 bpm, but staying mostly in 50s


-Continue to hold metoprolol due to bradycardia


-Consult GI to clear patient for starting anticoagulation given history of 

peptic ulcer disease and GI bleeding 1 year ago





Elevated troponin--NSTEMI vs demand ischemia?  No chest pain, no WMA on echo, 

no ischemic changes on EKG


-Troponins trending up


-Repeat EKG shows 53 bpm, A. fib


-Continue to trend enzymes





Tinea corpus


-Continue fluconazole 200 mg IV daily


-Continue use clotrimazole lotion topically once discharged





Hypertension


- Hold lisinopril and metoprolol as patient is bradycardic in the ER, heart rate

= 49-59


- Restart when medically stable





BPH


-Continue on Flomax 0.4 mg





DVT prophylaxis


-Heparin 5000 units SC q8h


-ESME hose and SCDs





Code Status


-Level I, FULL RESUSCITATION STATUS





Dispo


-Pt from home


-PT/OT ordered


 (Alexandra Phelps PA-C)





Reviewed:  Pt Seen/Exam by Me


 (Violeta Wolff MD)


History


Physician Assistant Supervision Note:


I interviewed and examined the patient. Discussed with ANTONIO Phelps and agree 

with findings and plan as documented in the note. Any exceptions or 

clarifications are listed here: 





Pt feeling much better, less SOB, still with cough not producing much. Is 

putting out a lot of urine.





Vitals and tele reviewed


NAD, AAOx3


Irreg irreg bradycardic I cannot auscultate a murmur


+bibasilar crackles, moving air well through rest of lungs


Abd soft NT ND +BS


Ext trace pitting edema legs bilat


Skin with diffuse erythematous crusty rash with central clearing, sharply 

demarcated borders





85 yo male with A-fib with slow ventricular response, acute on chronic combined 

systolic/diastolic CHF, and suspected PNA. Improving.


-continue to hold beta blocker


-GI saw and said ASA ok with PPI, but need to address anticoagulation using 

coumadin or perhaps NOAC but assume risk would be similar


-Appreciate GI and Cardiology input


-Nuc stress prior to discharge for demand ischemia vs tail end of NSTEMI


-continue abx for now for PNA





Documented By:  Violeta Wolff


 (Violeta Wolff MD)

## 2017-03-14 NOTE — ECHOCARDIOGRAM REPORT
*NOTICE TO RECEIVING PARTY AGENCY**  This information is strictly Confidential and protected under 
[place] law.  [place] law prohibits you from making any further disclosure of this 
information unless further disclosure is expressly permitted by the written consent of the person to 
whom it pertains or is authorized by law.  A general authorization for the release of medical or 
other information is not sufficient for this purpose.  Hospital accepts no responsibility if the 
information is made available to any other person, INCLUDING THE PATIENT.



Interpretation Summary

  *  Name: MARIE RENO  Study Date: 2017 07:24 AM  BP: 122/65 mmHg

  *  MRN: [id number]  Patient Location: C.2E\S\E207\S\1  HR: 57

  *  : 1931 (M/d/yyyy)  Gender: Male  Height: 67 in

  *  Age: 86 yrs  Ethnicity: [place]  Weight: 187 lb

  *  Ordering Physician: Constanza Saha PA-C

  *  Referring Physician: Self, Referred

  *  Performed By: CASSIA Guidry RCS

  *  Accession# [id number]  Account# [id number]

  *  Reason For Study: CHF

  *  BSA: 2.0 m2

  *  -- Conclusions --

  *  There is moderate concentric left ventricular hypertrophy.

  *  Left ventricular systolic function is normal.

  *  The right ventricular systolic function is mildly reduced.

  *  The left atrium is mildly dilated.

  *  The right atrium is mildly dilated.

  *  Mild to moderate aortic regurgitation.

  *  Right ventricular systolic pressure is elevated at 30-40mmHg.

  *  The inferior vena cava is mildly dilated.

  *  Mild aortic root dilatation.

  *  Moderate size pericardial effusion.

Procedure Details

  *  A complete two-dimensional transthoracic echocardiogram was performed (2D, M-mode, Doppler and 
color flow Doppler).

Left Ventricle

  *  The left ventricle is grossly normal size.

  *  There is moderate concentric left ventricular hypertrophy.

  *  Ejection Fraction = 50-55%.

  *  Left ventricular systolic function is normal.

Right Ventricle

  *  The right ventricle is grossly normal size.

  *  The right ventricular systolic function is mildly reduced.

Atria

  *  The left atrium is mildly dilated.

  *  The right atrium is mildly dilated.

Mitral Valve

  *  The mitral valve is grossly normal.

  *  Significant mitral regurgitation is absent.

Tricuspid Valve

  *  The tricuspid valve is not well visualized, but is grossly normal.

  *  There is mild tricuspid regurgitation.

  *  Right ventricular systolic pressure is elevated at 30-40mmHg.

Aortic Valve

  *  Aortic valve sclerosis mild, without significant aortic valvular stenosis.

  *  No hemodynamically significant valvular aortic stenosis.

  *  Mild to moderate aortic regurgitation.

Great Vessels

  *  Mild aortic root dilatation.

Pericardium/Pleural

  *  Moderate size pericardial effusion.

Great Vessels

  *  The inferior vena cava is mildly dilated.

Left Ventricular Diastolic Function

  *  Not evaluated due to arrhythmia



MMode 2D Measurements and Calculations

IVSd 1.6 cm

IVSs 1.7 cm



LVIDd 4.5 cm

LVIDs 3.2 cm

LVPWd 1.5 cm

LVPWs 2.1 cm



IVS/LVPW 1.1 

FS 27.8 %

EDV(Teich) 91.5 ml

ESV(Teich) 42.1 ml

EF(Teich) 54.0 %



EDV(cubed) 89.9 ml

ESV(cubed) 33.9 ml

EF(cubed) 62.3 %

% IVS thick 6.6 %

% LVPW thick 35.6 %





LV mass(C)d 297.8 grams

LV mass(C)dI 151.6 grams/m\S\2

LV mass(C)s 265.0 grams

LV mass(C)sI 134.9 grams/m\S\2



CO(Teich) 2.8 l/min

CI(Teich) 1.4 l/min/m\S\2

SV(Teich) 49.4 ml

SI(Teich) 25.1 ml/m\S\2

CO(cubed) 3.2 l/min

CI(cubed) 1.6 l/min/m\S\2

SV(cubed) 56.0 ml

SI(cubed) 28.5 ml/m\S\2



Ao root diam 4.2 cm

Ao root area 13.6 cm\S\2

ACS 1.5 cm

LA dimension 4.2 cm



LA/Ao 1.0 





LVAd ap4 24.7 cm\S\2

LVLd ap4 8.4 cm

EDV(MOD-sp4) 59.0 ml

LVAs ap4 13.5 cm\S\2

LVLs ap4 7.2 cm

ESV(MOD-sp4) 23.0 ml

EF(MOD-sp4) 61.0 %



LVAd ap2 18.4 cm\S\2

LVLd ap2 8.0 cm

EDV(MOD-sp2) 35.0 ml

LVAs ap2 12.0 cm\S\2

LVLs ap2 6.9 cm

ESV(MOD-sp2) 17.0 ml

EF(MOD-sp2) 51.4 %



CO(MOD-sp4) 2.1 l/min

CI(MOD-sp4) 1.0 l/min/m\S\2

SV(MOD-sp4) 36.0 ml

SI(MOD-sp4) 18.3 ml/m\S\2



CO(MOD-sp2) 1.0 l/min

CI(MOD-sp2) 0.52 l/min/m\S\2

SV(MOD-sp2) 18.0 ml

SI(MOD-sp2) 9.2 ml/m\S\2







Doppler Measurements and Calculations

MV E max shanae 98.2 cm/sec



MV P1/2t max shanae 98.7 cm/sec

MV P1/2t 52.7 msec

MVA(P1/2t) 4.2 cm\S\2

MV dec slope 548.2 cm/sec\S\2

MV dec time 0.20 sec



Ao V2 max 103.5 cm/sec

Ao max PG 4.4 mmHg

Ao max PG (full) 2.0 mmHg



AI max shanae 308.9 cm/sec

AI max PG 38.2 mmHg

AI dec slope 127.9 cm/sec\S\2

AI P1/2t 707.6 msec





LV V1 max PG 2.4 mmHg



LV V1 max 77.3 cm/sec



PA V2 max 89.3 cm/sec

PA max PG 3.2 mmHg



PI max shanae 213.6 cm/sec

PI max PG 18.2 mmHg

PI dec slope 99.7 cm/sec\S\2

PI P1/2t 627.5 msec





TR max shanae 261.9 cm/sec

## 2017-03-14 NOTE — CLINICAL DOCUMENTATION QUERY
MS. GERMANLEXIS :



**** CLINICAL DOCUMENTATION QUERY****



Patient is an 86 year old male admitted for evaluation and treatment of shortness of 
breath.  Documentation includes a history of diastolic CHF but notes use of IV Lasix in 
addition to admission to telemetry, chest radiography, echocardiography, serial cardiac 
enzymes, I/O, daily weights, and cardiology consultation.  Please clarify as clinically 
appropriate.  Thank you.



In your clinical opinion is this patient being managed for:

    

                        ( x ) Acute on chronic diastolic congestive heart failure

                        (  ) Other explanation of clinical findings (Please Explain)

                        (  ) Unable to determine (Please Define)

                        (  ) Need to Discuss

                        (  ) Not Agree



The medical record reflects the following clinical findings, treatment, and risk factors.  
  



Clinical Indicators:IV Lasix in addition to admission to telemetry, chest radiography, 
echocardiography, serial cardiac enzymes, I/O, daily weights, and cardiology consultation

Treatment: As above

Risk Factors: Age, recent illness, history of diastolic CHF



Please clarify and document your clinical opinion in the progress notes and discharge 
summary. Terms such as "probable", "suspected", "likely", "questionable", "possible", or 
"still to be ruled out" are acceptable. 



*****IF IN AGREEMENT, YOU MUST DOCUMENT ABOVE DIAGNOSTIC STATEMENT IN DAILY PROGRESS NOTES 
AND DISCHARGE SUMMARY. This document is not part of the patient's record.*****

Thank You, Thaddeus Dunlap, ANASTASIA [phone]

## 2017-03-14 NOTE — GASTROINTESTINAL CONSULTATION
DATE OF CONSULTATION:  03/14/2017

 

REASON FOR EVALUATION:  AFib with prior history of peptic ulcer disease.

 

HISTORY OF PRESENT ILLNESS:  The patient is an 86-year-old male with chronic

atrial fibrillation with rate control.  The patient was seen a year ago by

Dr. Carrera for GI bleeding and had an EGD, which showed multiple antral ulcers

in the stomach.  Biopsies were negative for H. pylori and it was felt that

his ulcers were due to taking Aleve at night along with some whisky and

grapefruit juice.  The patient has stopped this behavior and in June 2016,

the patient was rescoped by Dr. Carrera and the ulcers were found to be healed. 

He presents with congestive heart failure now and is on a diuretic therapy. 

He has been started on a baby aspirin along with pantoprazole 40 mg a day for

gastric protection for his AFib and question is whether or not this is an

appropriate treatment given his previous ulcer disease.

 

PAST MEDICAL HISTORY:  Remarkable for chronic AFib, CHF, diverticulosis,

hyperlipidemia, hypertension, pneumonia, and viral encephalitis.

 

MEDICATIONS:  Vitamin D, docusate, finasteride, Flomax, Lasix, Zestril,

Toprol, Ocuvite, Senokot, and tramadol p.r.n.

 

ALLERGIES:  None.

 

FAMILY HISTORY:  Noncontributory.

 

SOCIAL HISTORY:  The patient is a nonsmoker, occasionally drank whiskey in

the past, , retired , and lives with his family.

 

REVIEW OF SYSTEMS:  Positive for some fatigue, shortness of breath and some

swelling, the remainder is negative.

 

PHYSICAL EXAMINATION:

GENERAL:  The patient appears in no acute distress.

LUNGS:  Showed decreased breath sounds.

HEART:  Showed an irregularly irregular rhythm.

ABDOMEN:  Soft and nontender.

 

LABORATORY:  Shows hemoglobin of 15.7, hematocrit 46.1, and white count is

9.77.

 

IMPRESSION:  The patient has chronic atrial fibrillation and a previous

history of peptic ulcer disease related to nonsteroidals.  These ulcers were

proven to be healed after nonsteroidals were stopped and he was treated.  I

believe that it is probably minimal risk for him to take baby aspirin once a

day as long as he takes a proton pump inhibitor once a day for gastric

protection, this reduces the risk for recurrent ulcer by about 75%.  I

discussed this with the patient and he is agreeable to this plan.

## 2017-03-14 NOTE — CARDIOLOGY CONSULTATION
DATE OF CONSULTATION:  03/14/2017

 

CHIEF COMPLAINT:  Shortness of breath.

 

HISTORY OF PRESENT ILLNESS:  Mr. Willie Turner is an 86-year-old gentleman

without a known history of cardiac disease, who was recently diagnosed with

influenza and community-acquired pneumonia.  The patient began treatment with

outpatient steroids, nebulizers and antibiotics, but his symptoms appear to

have progressed over the preceding 2 weeks to the point where he was more

short of breath.  The patient states yesterday he noticed while lying flat,

he had difficulty breathing.  His breathing improved with sitting upright. 

He denies recent fevers or chills and in fact, did not endorse any recent

fevers associated with this illness.  He has had a cough, initially that was

productive of some discolored sputum, but now produces only scant clear

sputum, which he has difficulty expectorating.  He states that at times with

coughing, he does have a sense of chest discomfort.  He also has a sense of

dizziness with forceful coughing at times.  He was prescribed an antitussive

medication and this appears to have caused some mild dizziness at times as

well.  He has not been aware of any additional chest pains over this

timeframe.  He did not endorse symptoms of chest discomfort, chest pressure

or chest heaviness in general, but later in the interview, did endorse a

sense of perhaps a fullness in the chest, primarily in the left precordium. 

The patient is also not aware of any palpitations or irregularity in his

heartbeat.  In general, he is very sedentary individual, who has difficulty

with ambulation due to lower extremity weakness and general deconditioning. 

He also has an element of instability.  Generally speaking, he ambulates with

a walker and when performing outdoor activities, uses a scooter.  He does

have an element of exertional dyspnea on occasion, but as previously

mentioned, does little exertional activity.

 

PAST MEDICAL HISTORY:

1.  Diastolic dysfunction based on prior echocardiogram.

2.  Reported history of atrial fibrillation, although no clear documentation

in the patient's record.

3.  Diverticulosis.

4.  Gastroesophageal hemorrhage related to gastric ulcer and possible NSAID

abuse.

5.  Hyperlipidemia.

6.  Hypertension.

7.  Tinea corporis.

8.  Gout.

9.  History of hiatal hernia.

10.  Hyperparathyroidism, status post parathyroidectomy.

11.  Osteoporosis.

12.  BPH.

 

PAST SURGICAL HISTORY:  Includes,

1.  Laparotomy with a history of colostomy and subsequent closure.

2.  Parathyroidectomy.

 

FAMILY HISTORY:  Noncontributory.

 

SOCIAL HISTORY:  The patient is a lifelong nonsmoker.  He does occasionally

drink alcohol.  Currently lives with his wife locally.

 

OUTPATIENT MEDICATIONS:  Include albuterol, Cheratussin, finasteride,

furosemide, lisinopril, metoprolol, prednisone, tamsulosin, tramadol and a

variety of supplements.

 

MEDICAL ALLERGIES:  No known medical allergies.

 

REVIEW OF SYSTEMS:  A complete system review of systems was performed.  The

pertinent positives are noted in the history of present illness.  The patient

did endorse a sense of occasional swelling in his lower extremities.  It

waxes and wanes in severity.  He claims to have a stable weight at home.  He

does report some medical noncompliance; however, he did skip some of his

Lasix recently.  He has not suffered a syncopal episode recently.  He has no

reported change in his bowel or bladder habits.

 

PHYSICAL EXAMINATION:

GENERAL:  The patient does not appear to be in acute distress.  He is a

pleasant individual, who is alert and oriented.  His mood and affect appeared

normal.

VITAL SIGNS:  Included a blood pressure of 136/81 with a pulse of 52.

HEENT:  His sclerae anicteric.  Pupils equal and reactive to light and

accommodation.  Extraocular movements were intact.

NECK:  Palpitation of the submandibular region did not reveal any significant

adenopathy.  The carotids are palpable bilaterally.  I do not appreciate

bruits on auscultation.  There did not appear to be any significant jugular

venous distention.  The thyroid was not enlarged.  There was a well-healed

scar in the thyroid area.

LUNGS:  Auscultation of both lungs fields revealed them to be clear with the

exception of the left base, which had occasional crackles and reduced breath

sounds.  Overall excursion was normal; however, there was no expiratory

wheezing.  He has normal respiratory effort without use of accessory muscles.

HEART:  Revealed him to be in an irregularly irregular rhythm.  There was a

very soft crescendo systolic murmur with short diastolic component that

varied in intensity.  PMI was slightly displaced left laterally.

ABDOMEN:  Soft and nontender.

EXTREMITIES:  Evaluation of both wrists revealed that radial pulses are equal

in intensity.  There is no evidence of cyanosis or clubbing.  He was missing

the left fourth and fifth digits on his hand.  Evaluation of lower

extremities revealed SCDs to have been applied; however, there was evidence

of mild ankle swelling.  He did have a notable rashes on his body consistent

with a tinea infection.

 

LABORATORY STUDIES:  Obtained since admission include a white cell count of

8.7, hemoglobin of 15 and a platelet count of 114.  Sodium is 139, potassium

is 3.9, BUN was 17, and creatinine 0.91.  Serial cardiac biomarkers have been

obtained and include a result of 0.25, 0.26 and 0.35.  N-terminal proBNP was

3462.

 

DIAGNOSTIC STUDIES:  The patient did undergo echocardiography today, which

revealed preserved left ventricular systolic function with an element of left

ventricular hypertrophy.  There was a moderate size pericardial effusion. 

There was an element of aortic insufficiency.  There did not appear to be any

Doppler evidence suggestive of pericardial tamponade.  The patient also

underwent serial EKGs, which demonstrate atrial fibrillation with a slow

ventricular response.  There is a suggestion of an old inferior myocardial

infarction and possibly an old anterior infarction based on the presence of Q

waves.  Chest x-ray was obtained at the time of admission, which did reveal

evidence of pleural effusions and some cardiomegaly.  Radiologist did not

feel that the picture was consistent with pulmonary vascular congestion. 

Review of the patient's outpatient records including a CT scan performed in

March of last year, which demonstrated the presence of pericardial effusion

measured at that time at 12 mm.

 

IMPRESSION AND PLAN:

1.  Pericardial effusion.  This is likely a chronic process that was noted on

a CT scan performed nearly a year ago.  The patient did not have any overt

symptoms of cardiac tamponade or hemodynamic embarrassment.  He has a normal

pulse and blood pressure.  He has minimal jugular venous distention and he

did not have a increased pulsus paradoxus on measurement at the bedside

today.  The etiology of his pericardial effusion is unclear, likely an

inflammatory process, possibly related to or exacerbated by his recent viral

infection.  There is no rub on examination.  He has minimal symptoms

associated with effusion.  At this time, I think it would be reasonable to

simply monitor it.  We will need to be careful with respect to his diuresis

as he may have elevated right atrial pressures as a compensatory process.

2.  Atrial fibrillation.  This is likely new for the patient having developed

over the past 2 weeks, likely related to his viral or pulmonary illness.  At

the time of his acute visit for his pneumonia, he was felt to have a regular

pulse.  I could not find any records in his outpatient chart documenting

atrial fibrillation.  He is not currently symptomatic and has no difficulties

with rate control.  Ideally, the patient would be on systemic anticoagulation

for stroke prophylaxis; however, he does have a history of gastrointestinal

hemorrhage.  EGD performed in June of last year did reveal healing of the

gastric ulcer and he may be a good candidate for anticoagulation based on

that result.  I think at this point, we can defer anticoagulation until his

acute illness resolves and perhaps have GI eval regarding his risk for recurrent

bleeding.

3.  Bradycardia.  The patient does have an element of bradycardia.  He is on

outpatient regimen of a beta blocker.  At this point, it seems reasonable to

hold the beta blocker to see if his pulse rate improves.

4.  Non-ST elevation myocardial infarction.  The patient has elevated cardiac

biomarkers.  The overall trajectory of the biomarkers is relatively flat.  He

did not describe a syndrome consistent with an acute coronary process. 

However, he could have had a silent infarct several days ago.  Also, the

possibility this represents a form of myocarditis associated with his acute

illness.  He does have some EKG changes suggestive of a completed infarct,

but his echocardiogram did not reveal any evidence of wall motion

abnormalities.  At this point, he is not an ideal candidate for percutaneous

intervention given his history of gastrointestinal hemorrhage and possible

need for systemic anticoagulation.  With the absence of symptoms with

preserved left ventricular systolic function, it would seem reasonable to

perform perfusion imaging in order to identify high risk territories prior to

engaging on coronary angiography or possible percutaneous intervention.

5.  Acute left ventricular systolic failure.  This may be diastolic in

nature.  The patient does have an element of left ventricular hypertrophy and

recently converted to atrial fibrillation.  This will compromise ventricular

filling and possibly produce pulmonary vascular congestion.  He has preserved

left ventricular systolic function and this overall process may have been

exacerbated by his use of steroids and noncompliance with his diuretic

regimen.  At this juncture, it would seem reasonable continue him on the

currently prescribed diuretic.  His symptoms appear to have improved

currently.  We can monitor his ins and outs over the next day and repeat his

examination as well.  Ideally, the patient would be on beta blockade, but I

think at this point it is more reasonable to hold the beta blocker in hopes

that his heart rate improves.  This may have a more immediate effect on his

symptoms.

 

FINAL RECOMMENDATIONS:

1.  Continue current diuretic dose, monitoring of symptoms and output.

2.  The patient will likely need systemic anticoagulation for atrial

fibrillation.  Initiation could be deferred until either cleared by GI or we

note some improvement in his acute illness.  No plans for cardioversion

currently.

3.  Plan on cardiac perfusion study to identify high risk territory.

4.  Consider a repeat limited echocardiogram prior to discharge for

evaluation of pericardial effusion.

5.  Continue trending cardiac biomarkers.

6.  Agree with holding beta blocker at this time.

 

 

DESIREED

## 2017-03-14 NOTE — CLINICAL DOCUMENTATION QUERY
GOPI Rodriguez :



**** CLINICAL DOCUMENTATION QUERY****



Patient is an 86 year old male admitted for evaluation and treatment of shortness of 
breath.  Documentation includes a history of diastolic CHF but notes use of IV Lasix in 
addition to admission to telemetry, chest radiography, echocardiography, serial cardiac 
enzymes, I/O, daily weights, and cardiology consultation.  Please clarify as clinically 
appropriate.  Thank you.



In your clinical opinion is this patient being managed for:

    

                        ( x ) Acute on chronic diastolic congestive heart failure

                        (  ) Other explanation of clinical findings (Please Explain)

                        (  ) Unable to determine (Please Define)

                        (  ) Need to Discuss

                        (  ) Not Agree



The medical record reflects the following clinical findings, treatment, and risk factors.  
  



Clinical Indicators:IV Lasix in addition to admission to telemetry, chest radiography, 
echocardiography, serial cardiac enzymes, I/O, daily weights, and cardiology consultation

Treatment: As above

Risk Factors: Age, recent illness, history of diastolic CHF



Please clarify and document your clinical opinion in the progress notes and discharge 
summary. Terms such as "probable", "suspected", "likely", "questionable", "possible", or 
"still to be ruled out" are acceptable. 



*****IF IN AGREEMENT, YOU MUST DOCUMENT ABOVE DIAGNOSTIC STATEMENT IN DAILY PROGRESS NOTES 
AND DISCHARGE SUMMARY. This document is not part of the patient's record.*****

Thank You, Thaddeus Dunlap, ANASTASIA [phone]

## 2017-03-15 VITALS — OXYGEN SATURATION: 94 %

## 2017-03-15 VITALS — HEART RATE: 63 BPM | OXYGEN SATURATION: 94 %

## 2017-03-15 VITALS
OXYGEN SATURATION: 94 % | TEMPERATURE: 97.7 F | DIASTOLIC BLOOD PRESSURE: 81 MMHG | SYSTOLIC BLOOD PRESSURE: 147 MMHG | HEART RATE: 72 BPM

## 2017-03-15 VITALS
SYSTOLIC BLOOD PRESSURE: 121 MMHG | DIASTOLIC BLOOD PRESSURE: 58 MMHG | OXYGEN SATURATION: 92 % | TEMPERATURE: 97.34 F | HEART RATE: 62 BPM

## 2017-03-15 VITALS
HEART RATE: 57 BPM | SYSTOLIC BLOOD PRESSURE: 137 MMHG | OXYGEN SATURATION: 91 % | TEMPERATURE: 98.24 F | DIASTOLIC BLOOD PRESSURE: 73 MMHG

## 2017-03-15 VITALS
OXYGEN SATURATION: 91 % | HEART RATE: 70 BPM | TEMPERATURE: 97.34 F | SYSTOLIC BLOOD PRESSURE: 141 MMHG | DIASTOLIC BLOOD PRESSURE: 78 MMHG

## 2017-03-15 VITALS
HEART RATE: 62 BPM | DIASTOLIC BLOOD PRESSURE: 50 MMHG | OXYGEN SATURATION: 96 % | TEMPERATURE: 97.34 F | SYSTOLIC BLOOD PRESSURE: 114 MMHG

## 2017-03-15 VITALS
SYSTOLIC BLOOD PRESSURE: 126 MMHG | HEART RATE: 64 BPM | TEMPERATURE: 97.34 F | OXYGEN SATURATION: 88 % | DIASTOLIC BLOOD PRESSURE: 51 MMHG

## 2017-03-15 VITALS — OXYGEN SATURATION: 91 % | HEART RATE: 51 BPM

## 2017-03-15 VITALS — OXYGEN SATURATION: 92 % | HEART RATE: 57 BPM

## 2017-03-15 LAB
ANION GAP SERPL CALC-SCNC: 9 MMOL/L (ref 3–11)
BASOPHILS # BLD: 0.01 K/UL (ref 0–0.2)
BASOPHILS NFR BLD: 0.1 %
BUN SERPL-MCNC: 22 MG/DL (ref 7–18)
BUN/CREAT SERPL: 22 (ref 10–20)
CALCIUM SERPL-MCNC: 9.7 MG/DL (ref 8.5–10.1)
CHLORIDE SERPL-SCNC: 102 MMOL/L (ref 98–107)
CO2 SERPL-SCNC: 28 MMOL/L (ref 21–32)
COMPLETE: YES
CREAT CL PREDICTED SERPL C-G-VRATE: 56.8 ML/MIN
CREAT SERPL-MCNC: 0.98 MG/DL (ref 0.6–1.4)
EOSINOPHIL NFR BLD AUTO: 117 K/UL (ref 130–400)
GLUCOSE SERPL-MCNC: 105 MG/DL (ref 70–99)
HCT VFR BLD CALC: 44.3 % (ref 42–52)
IG%: 0.1 %
IMM GRANULOCYTES NFR BLD AUTO: 24.9 %
LYMPHOCYTES # BLD: 1.88 K/UL (ref 1.2–3.4)
MCH RBC QN AUTO: 29.4 PG (ref 25–34)
MCHC RBC AUTO-ENTMCNC: 32.7 G/DL (ref 32–36)
MCV RBC AUTO: 89.9 FL (ref 80–100)
MONOCYTES NFR BLD: 8.7 %
NEUTROPHILS # BLD AUTO: 0.9 %
NEUTROPHILS NFR BLD AUTO: 65.3 %
PMV BLD AUTO: 11.1 FL (ref 7.4–10.4)
POTASSIUM SERPL-SCNC: 3.9 MMOL/L (ref 3.5–5.1)
RBC # BLD AUTO: 4.93 M/UL (ref 4.7–6.1)
SODIUM SERPL-SCNC: 139 MMOL/L (ref 136–145)
WBC # BLD AUTO: 7.55 K/UL (ref 4.8–10.8)

## 2017-03-15 RX ADMIN — IPRATROPIUM BROMIDE AND ALBUTEROL SULFATE SCH ML: .5; 3 SOLUTION RESPIRATORY (INHALATION) at 11:22

## 2017-03-15 RX ADMIN — GUAIFENESIN SCH MG: 600 TABLET, EXTENDED RELEASE ORAL at 07:34

## 2017-03-15 RX ADMIN — IPRATROPIUM BROMIDE AND ALBUTEROL SULFATE SCH ML: .5; 3 SOLUTION RESPIRATORY (INHALATION) at 07:33

## 2017-03-15 RX ADMIN — FUROSEMIDE SCH MLS/MIN: 10 INJECTION, SOLUTION INTRAMUSCULAR; INTRAVENOUS at 07:34

## 2017-03-15 RX ADMIN — PANTOPRAZOLE SCH MG: 40 TABLET, DELAYED RELEASE ORAL at 07:34

## 2017-03-15 RX ADMIN — IPRATROPIUM BROMIDE AND ALBUTEROL SULFATE SCH ML: .5; 3 SOLUTION RESPIRATORY (INHALATION) at 15:20

## 2017-03-15 RX ADMIN — CEFTRIAXONE SODIUM SCH MLS/HR: 1 INJECTION, POWDER, FOR SOLUTION INTRAVENOUS at 20:17

## 2017-03-15 RX ADMIN — APIXABAN SCH MG: 2.5 TABLET, FILM COATED ORAL at 20:18

## 2017-03-15 RX ADMIN — IPRATROPIUM BROMIDE AND ALBUTEROL SULFATE SCH ML: .5; 3 SOLUTION RESPIRATORY (INHALATION) at 19:23

## 2017-03-15 RX ADMIN — Medication SCH MG: at 07:34

## 2017-03-15 RX ADMIN — GUAIFENESIN SCH MG: 600 TABLET, EXTENDED RELEASE ORAL at 20:17

## 2017-03-15 RX ADMIN — HEPARIN SODIUM SCH UNIT: 10000 INJECTION, SOLUTION INTRAVENOUS; SUBCUTANEOUS at 14:16

## 2017-03-15 NOTE — CARDIOLOGY PROGRESS NOTE
DATE: 03/15/2017

 

DATE:  03/15/2017.  

 

SUBJECTIVE:  This morning Mr. Turner claims to be feeling better.  He states

that his breathing has improved. He was able to lay flat over the course of

the evening and did not have to sit upright.  He does have a productive cough

which produces scant sputum.  He did feel better after recent nebulizer

treatment and expectoration.  He has been ambulatory to the bathroom and

denies significant dizziness.  He is weak and has not done more ambulating.  

 

 

PHYSICAL EXAMINATION: 

GENERAL:  The patient did not appear to have any acute distress.  He is a

pleasant individual who is alert and oriented.  His mood and affect appeared

normal.  He was lying in the bed flat when I interviewed him today.   

CURRENT VITAL SIGNS:  Include blood pressure of 126/51 with a pulse of 64. 

LUNGS:  Auscultation of his lung fields reveal crackles in the left base with

somewhat reduced air movement at that location.  There were no expiratory

wheezing however.  There is some upper airway congestion and bronchial breath

sounds.  

CARDIAC EXAMINATION:  Revealed him to be in an irregular irregular rhythm.  I

did not appreciate any murmurs on examination today.  Evaluation of his

telemetry persistent atrial fibrillation with a controlled rate.  

 

LABORATORY STUDIES:  Obtained today included a white cell count of 7.5,

hemoglobin of 14, platelet count of 117.  Chemistry profile revealed a sodium

139, potassium 3.9, creatinine was 0.98.  Serial cardiac biomarkers were

obtained yesterday and included a 0.26, a 0.35 and a 0.26.  

 

ASSESSMENT AND PLAN: 

1.  Non-ST elevation myocardial infarction.  The patient's cardiac biomarkers

do not have a trajectory consistent with a recent myocardial infarction.  He

may have suffered a remote event.  This may also be related to strain or

possibly a form of myocarditis, especially given his pericardial effusion. 

Based on his absence of ischemic symptoms and the likely subacute nature of

any  coronary event should it have been one seemed reasonable to proceed with

perfusion scanning today.  The patient was administered aspirin yesterday. 

Additional recommendations will follow his perfusion study.  

 

2.  Atrial fibrillation.  This is persistent in nature.  He is not overtly

symptomatic although this may have contributed to any decompensation of what

is suspected to be diastolic heart failure.  At this point we have not

proceeded with anticoagulation pending additional studies however it seems

reasonable to consider warfarin therapy at the conclusion of his acute

hospitalization.  At that point we could discontinue aspirin.  

 

3.  Pericardial effusion.  This is likely chronic in nature based on the

results of prior imaging studies.  He does not appear to be hemodynamically

compromised currently.  This can be monitored.  Additional diagnostic

information could be obtained from pleural fluid if necessary as likely the

same process.  

 

4.  Decompensated diastolic heart failure.  Overall the patient's congestive

symptoms appear to be improved by history.  His examination has not changed

much.  He affected some diuresis yesterday, the exact nature of which is

difficult to determine due to his noncompliance with use of the bedside

urinal.  Given his clinical improvement and likely diuresis seems reasonable

to continue with his current dose of diuretic today switching to an oral

regimen at the time of discharge.  

 

FINAL RECOMMENDATIONS:  

1.  Continue current dose of diuretic.

2.  Continue to hold metoprolol given persistently lower heart rate.  

3.  Plan Lexiscan perfusion study today.

## 2017-03-15 NOTE — HOSPITALIST PROGRESS NOTE
Hospitalist Progress Note


Date of Service


Mar 15, 2017.


 (Alexandra Phelps ., PA-C)





Subjective


Pt evaluation today including:  conversation w/ patient, physical exam, chart 

review, lab review, conversation w/ consultant (spoke with Dr. Case), review 

of inpatient medication list


PO Intake:  NPO for perfusion study


Voiding:  no voiding problems


Patient reports feeling better today.  He states that he is currently not short 

of breath and he is now able to lay in his bed without orthopnea.  The patient 

was placed on nasal cannula this morning after his O2 saturations went down to 

88 % on room air, however.  Patient also complains of productive cough.  He 

states he is producing somewhat more mucus compared to yesterday, but is still 

clear.  He states that the nebulizer treatments are helping.  The patient is 

being kept NPO for a perfusion study this morning.  He states that he is 

urinating without any difficulty.  The chart does not seem to show much urinary 

output, however, the nurse states that he refuses to urinate in the urinal and 

often misses the hat placed in the toilet so measured urine output is not 

accurate.  The patient denies fevers, chills, sweats, chest pain, palpitations, 

claudication, wheezing, shortness of breath, nausea, vomiting, abdominal pain, 

dysuria, hematuria, urinary retention, paralysis, weakness, numbness and 

tingling.





   Additional Comments:


See HPI for pertinent positives and negatives.  All other systems reviewed and 

negative.


 (Alexandra Phelps ., PA-C)





Objective


Vital Signs











  Date Time  Temp Pulse Resp B/P Pulse Ox O2 Delivery O2 Flow Rate FiO2


 


3/15/17 08:00     94 Nasal Cannula 2.0 


 


3/15/17 07:47     94 Nasal Cannula 2.0 


 


3/15/17 07:46 36.3 64 20 126/51 88 Room Air  


 


3/15/17 07:33  51 18  91 Room Air  


 


3/15/17 04:13 36.3 62 21 121/58 92 Room Air  


 


3/15/17 04:00      Room Air  


 


3/15/17 00:07 36.8 57 22 137/73 91   


 


3/15/17 00:01      Room Air  


 


3/14/17 20:00      Room Air  


 


3/14/17 19:00  81 18  93 Room Air  


 


3/14/17 19:00 36.3 68 17 133/66 99 Room Air  


 


3/14/17 16:00      Room Air  


 


3/14/17 15:42  57 16  93 Room Air  


 


3/14/17 15:20 36.6 58 15 101/63 93 Room Air  


 


3/14/17 12:00      Room Air  








 (Alexandra Phelps ., PA-C)





Physical Exam


General Appearance:  WD/WN, no apparent distress


Eyes:  normal inspection, PERRL, EOMI


ENT:  normal ENT inspection, hearing grossly normal, pharynx normal


Neck:  supple, no JVD, trachea midline


Respiratory/Chest:  normal breath sounds, no respiratory distress, + decreased 

breath sounds, + crackles (bases R>L)


Cardiovascular:  no gallop, no murmur, + bradycardia, + irregularly irregular


Abdomen:  normal bowel sounds, non tender, soft


Extremities:  non-tender, normal inspection, + pedal edema (1+ pitting edema)


Neurologic/Psychiatric:  alert, normal mood/affect, oriented x 3


Skin:  normal color, warm/dry, + rash (tinea corporis rash inner thighs, right 

flank, lower abdomen, left anterior chest/shoulder)


 (Alexandra Phelps ., PA-C)





Laboratory Results





Last 24 Hours








Test


  3/14/17


14:02 3/15/17


05:52


 


Troponin I 0.265 ng/ml  


 


White Blood Count  7.55 K/uL 


 


Red Blood Count  4.93 M/uL 


 


Hemoglobin  14.5 g/dL 


 


Hematocrit  44.3 % 


 


Mean Corpuscular Volume  89.9 fL 


 


Mean Corpuscular Hemoglobin  29.4 pg 


 


Mean Corpuscular Hemoglobin


Concent 


  32.7 g/dl 


 


 


Platelet Count  117 K/uL 


 


Mean Platelet Volume  11.1 fL 


 


Neutrophils (%) (Auto)  65.3 % 


 


Lymphocytes (%) (Auto)  24.9 % 


 


Monocytes (%) (Auto)  8.7 % 


 


Eosinophils (%) (Auto)  0.9 % 


 


Basophils (%) (Auto)  0.1 % 


 


Neutrophils # (Auto)  4.92 K/uL 


 


Lymphocytes # (Auto)  1.88 K/uL 


 


Monocytes # (Auto)  0.66 K/uL 


 


Eosinophils # (Auto)  0.07 K/uL 


 


Basophils # (Auto)  0.01 K/uL 


 


RDW Standard Deviation  49.8 fL 


 


RDW Coefficient of Variation  15.3 % 


 


Immature Granulocyte % (Auto)  0.1 % 


 


Immature Granulocyte # (Auto)  0.01 K/uL 


 


Sodium Level  139 mmol/L 


 


Potassium Level  3.9 mmol/L 


 


Chloride Level  102 mmol/L 


 


Carbon Dioxide Level  28 mmol/L 


 


Anion Gap  9.0 mmol/L 


 


Blood Urea Nitrogen  22 mg/dl 


 


Creatinine  0.98 mg/dl 


 


Est Creatinine Clear Calc


Drug Dose 


  56.8 ml/min 


 


 


Estimated GFR (


American) 


  80.6 


 


 


Estimated GFR (Non-


American 


  69.5 


 


 


BUN/Creatinine Ratio  22.0 


 


Random Glucose  105 mg/dl 


 


Calcium Level  9.7 mg/dl 








 (Alexandra Phelps, KENNY)





Assessment and Plan


87 y/o male with a history of chronic diastolic CHF, recent influenza A, patent 

atrial foramen, diverticulosis, hypertension, BPH and h/o PUD with GI bleeding 

who presented to the ED with shortness of breath that has been worsening over 

the last 2 weeks.  CXR shows bilateral pleural effusions bibasilar opacities.  

Cardiac enzymes elevated upon arrival.





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


-Admit to telemetry


-Continue Lasix 40 mg IV qd


-BNP elevated 3462


-Echo:  Moderate LVH, right ventricular systolic function mildly reduced, L and 

R atria mildly dilated.  Mild to moderate aortic regurgitation.  Right 

ventricular systolic pressure elevated at 30-40 mmHg.  Moderate pericardial 

effusion


-Cardiology consulted, appreciate recs:  Pericardial effusion appears to be 

chronic as a similar effusion was noted on CT one year ago.  Monitor for now.  

Continue to hold beta blocker for now due to bradycardia.  Cardiac perfusion 

imaging today as can now lay flat.  Continue current dose of Lasix.





Presumed community acquired pneumonia--still c/o productive cough, SOB.  Little 

air movement, lungs diminished throughout


-Influenza negative.  Pertussis studies pending per ED


-Continue Rocephin 1 gm IV qd


-Decrease azithromycin 250 mg IV qd, total of 5 days abx.  Day #3


-Chest x-ray reviewed as above


-Duonebs QIDR and q2h prn SOB/wheezing


-Mucinex 600 mg PO BID





A-fib/a flutter with slow ventricular response--per patient records, patient 

was in a regular rhythm 2 weeks ago


-Monitored on telemetry.  Bradycardia overnight with heart rate in 40s to 50s 

in a-flutter


-Continue to hold metoprolol due to bradycardia


-Spoke with Dr. Case, pt cleared for anticoagulation


-Start Eliquis 5 mg PO BID





Elevated troponin--NSTEMI vs demand ischemia?  No chest pain, no WMA on echo, 

no ischemic changes on EKG


-Troponins trending up


-Repeat EKG shows 53 bpm, A. fib


-Repeat troponin 3/14 down to 0.265





Tinea corporis


-D/C IV fluconazole, switch to fluconazole 200 mg PO daily. Day #3


-Continue use clotrimazole lotion topically once discharged





Hypertension


- Hold lisinopril and metoprolol as patient is bradycardic in the ER, heart rate

= 49-59


- Restart when medically stable





BPH


-Continue on Flomax 0.4 mg





DVT prophylaxis


-Heparin 5000 units SC q8h


-ESME hose and SCDs





Code Status


-Level I, FULL RESUSCITATION STATUS





Dispo


-Pt from home


-PT recommend d/c home with family assist


 (Alexandra Phelps ., KENNY)





History


Physician Assistant Supervision Note:


I interviewed and examined the patient. Discussed with ANTONIO Phelps and agree 

with findings and plan as documented in the note. Any exceptions or 

clarifications are listed here: 





Pt feeling much better, less SOB, still with cough not producing much. I/Os not 

reliable as not compliant with urinal, daily weight is up?





Vitals and tele reviewed


NAD, AAOx3


Irreg irreg, normal rate, I cannot auscultate a murmur


+bibasilar crackles much improved from yesterday, moving air well through rest 

of lungs


Abd soft NT ND +BS


Ext trace pitting edema legs bilat


Skin with diffuse erythematous crusty rash with central clearing, sharply 

demarcated borders





87 yo male with A-fib with slow ventricular response, acute on chronic combined 

systolic/diastolic CHF, and suspected PNA. Improving.


-continue to hold beta blocker and may restart at half dose upon discharge but 

will discuss with Cardiology


-one more day of IV lasix and then switch to po for discharge


-watch renal function


-starting anticoagulation with Eliquis today-discussed risks and benefits with 

pt and cost is affordable


-Appreciate GI and Cardiology input


-Nuc stress for demand ischemia vs tail end of NSTEMI--> performed today and 

not read yet but seems to be normal on my review


-continue abx for now for PNA and switch to po for discharge possibly tomorrow


-repeat CXR in AM and follow to resolution after discharge


-may need 2 step tomorrow prior to discharge


-requesting Rx for albuterol nebs at home as this really helps him -he already 

has neb machine


-Diflucan for 2 week course then stop





Documented By:  Violeta Wolff


 (Violeta Wolff MD)

## 2017-03-16 VITALS
TEMPERATURE: 98.24 F | DIASTOLIC BLOOD PRESSURE: 70 MMHG | SYSTOLIC BLOOD PRESSURE: 143 MMHG | HEART RATE: 68 BPM | OXYGEN SATURATION: 92 %

## 2017-03-16 VITALS
DIASTOLIC BLOOD PRESSURE: 76 MMHG | OXYGEN SATURATION: 93 % | TEMPERATURE: 97.88 F | HEART RATE: 74 BPM | SYSTOLIC BLOOD PRESSURE: 154 MMHG

## 2017-03-16 VITALS
SYSTOLIC BLOOD PRESSURE: 141 MMHG | TEMPERATURE: 97.88 F | HEART RATE: 90 BPM | DIASTOLIC BLOOD PRESSURE: 68 MMHG | OXYGEN SATURATION: 94 %

## 2017-03-16 VITALS — HEART RATE: 90 BPM | OXYGEN SATURATION: 95 %

## 2017-03-16 VITALS — OXYGEN SATURATION: 93 % | HEART RATE: 50 BPM

## 2017-03-16 VITALS
OXYGEN SATURATION: 91 % | HEART RATE: 64 BPM | DIASTOLIC BLOOD PRESSURE: 71 MMHG | SYSTOLIC BLOOD PRESSURE: 138 MMHG | TEMPERATURE: 97.34 F

## 2017-03-16 VITALS
HEART RATE: 90 BPM | DIASTOLIC BLOOD PRESSURE: 68 MMHG | SYSTOLIC BLOOD PRESSURE: 141 MMHG | OXYGEN SATURATION: 94 % | TEMPERATURE: 97.88 F

## 2017-03-16 VITALS
SYSTOLIC BLOOD PRESSURE: 114 MMHG | DIASTOLIC BLOOD PRESSURE: 60 MMHG | HEART RATE: 66 BPM | OXYGEN SATURATION: 92 % | TEMPERATURE: 97.52 F

## 2017-03-16 VITALS — OXYGEN SATURATION: 95 %

## 2017-03-16 LAB
ANION GAP SERPL CALC-SCNC: 7 MMOL/L (ref 3–11)
BASOPHILS # BLD: 0.02 K/UL (ref 0–0.2)
BASOPHILS NFR BLD: 0.3 %
BUN SERPL-MCNC: 20 MG/DL (ref 7–18)
BUN/CREAT SERPL: 19.6 (ref 10–20)
CALCIUM SERPL-MCNC: 9.8 MG/DL (ref 8.5–10.1)
CHLORIDE SERPL-SCNC: 103 MMOL/L (ref 98–107)
CO2 SERPL-SCNC: 29 MMOL/L (ref 21–32)
COMPLETE: YES
CREAT CL PREDICTED SERPL C-G-VRATE: 55.3 ML/MIN
CREAT SERPL-MCNC: 1 MG/DL (ref 0.6–1.4)
EOSINOPHIL NFR BLD AUTO: 118 K/UL (ref 130–400)
GLUCOSE SERPL-MCNC: 101 MG/DL (ref 70–99)
HCT VFR BLD CALC: 43.7 % (ref 42–52)
IG%: 0.1 %
IMM GRANULOCYTES NFR BLD AUTO: 17.6 %
LYMPHOCYTES # BLD: 1.38 K/UL (ref 1.2–3.4)
MCH RBC QN AUTO: 29.4 PG (ref 25–34)
MCHC RBC AUTO-ENTMCNC: 33.2 G/DL (ref 32–36)
MCV RBC AUTO: 88.5 FL (ref 80–100)
MONOCYTES NFR BLD: 11.1 %
NEUTROPHILS # BLD AUTO: 0.9 %
NEUTROPHILS NFR BLD AUTO: 70 %
PMV BLD AUTO: 10.4 FL (ref 7.4–10.4)
POTASSIUM SERPL-SCNC: 4 MMOL/L (ref 3.5–5.1)
RBC # BLD AUTO: 4.94 M/UL (ref 4.7–6.1)
SODIUM SERPL-SCNC: 139 MMOL/L (ref 136–145)
WBC # BLD AUTO: 7.84 K/UL (ref 4.8–10.8)

## 2017-03-16 RX ADMIN — IPRATROPIUM BROMIDE AND ALBUTEROL SULFATE SCH ML: .5; 3 SOLUTION RESPIRATORY (INHALATION) at 11:03

## 2017-03-16 RX ADMIN — FUROSEMIDE SCH MLS/MIN: 10 INJECTION, SOLUTION INTRAMUSCULAR; INTRAVENOUS at 09:44

## 2017-03-16 RX ADMIN — IPRATROPIUM BROMIDE AND ALBUTEROL SULFATE SCH ML: .5; 3 SOLUTION RESPIRATORY (INHALATION) at 07:06

## 2017-03-16 RX ADMIN — PANTOPRAZOLE SCH MG: 40 TABLET, DELAYED RELEASE ORAL at 09:44

## 2017-03-16 RX ADMIN — APIXABAN SCH MG: 2.5 TABLET, FILM COATED ORAL at 09:44

## 2017-03-16 RX ADMIN — GUAIFENESIN SCH MG: 600 TABLET, EXTENDED RELEASE ORAL at 09:44

## 2017-03-16 RX ADMIN — Medication SCH MG: at 09:44

## 2017-03-16 NOTE — DISCHARGE INSTRUCTIONS
Discharge Instructions


Date of Service


Mar 16, 2017.


 (Alexandra Phelps .KENNY)





Admission


Reason for Admission:  Shortness Of Breath


 (Alexandra Phelps PA-C)





Discharge


Discharge Diagnosis / Problem:  Acute on chronic congestive heart failure, 

community acquired pneumonia


 (Alexandra Phelps PA-C)





Discharge Goals


Goal(s):  Decrease discomfort, Improve function, Diagnostic testing, 

Therapeutic intervention


 (Alexandra Phelps PA-C)





Activity Recommendations


Activity Limitations:  resume your previous activity (as tolerated)





.


 (Alexandra Phelps PA-C)





Instructions / Follow-Up


Instructions / Follow-Up


You were admitted to the hospital with worsening shortness of breath.  You were 

found to have an acute exacerbation of your chronic congestive heart failure.  

You were admitted to a telemetry unit for cardiac monitoring and treated with 

IV Lasix.  An echocardiogram, or ultrasound of the heart, was performed to 

assess your heart function.  Cardiology was also consulted to help manage your 

case.  Due to elevated cardiac enzymes that you had upon arrival, a nuclear 

perfusion scan was done to further assess for any ischemia.  This study did not 

show any significant new ischemic changes.  You will need to follow-up with the 

cardiologist, Dr. Kocher.  On discharge you will continue taking your Lasix 

medication.  You will take an increased dose of 40 mg daily.  A new 

prescription will be sent to the pharmacy.  Please take 1 tablet a day.





You were also found to have pneumonia. You were initially treated with IV 

antibiotics.  For discharge, you have been switched to an oral antibiotic to 

take once a day.  You have also been given albuterol nebulizers to use at home 

as needed as they have been very helpful for you.





Because your heart was in an irregular rhythm called atrial fibrillation, it is 

important that you are on anticoagulation medication to reduce your risk for 

stroke.  You have been started on a medication called Eliquis to take twice a 

day.  As discussed, this medication can increase your risk for bleeding.  

Please seek medical attention if you notice more bruising or bleeding per 

rectum.  Due to your history of peptic ulcer disease and a GI bleed, you have 

also been started on pantoprazole (Protonix) to help prevent further GI bleeds, 

especially now that you are on Eliquis.





One of your heart medications, metoprolol, had been held while you were 

inpatient because your heart rate was too slow.  This medication is very 

important for your heart, however, so this will be restarted upon discharge at 

a lower dose.  Please only take half a tablet of your metoprolol every morning.





For your body rash, you have been started on antifungal medication called 

fluconazole.  You've received several doses of this while inpatient.  You will 

continue this medication for a total of 2 weeks as an outpatient.  A 

prescription has been sent to your pharmacy.





Medications:





*Please take Eliquis 5 mg by mouth twice a day.  This is your blood thinner to 

reduce your risk of stroke.





*Please take pantoprazole (Protonix) 40 mg by mouth every morning.





*Please take metoprolol succinate 12.5 mg by mouth every morning.  You will 

need to cut the 25 mg tablets in half.  You can purchase a pill splitter at the 

pharmacy.





*Please take Lasix 40 mg by mouth daily.  This is an increase from your 

previous home dose.  A new prescription has been sent to your pharmacy and you 

will now only take 1 tablet a day. 





*Take Levaquin 750 mg by mouth daily at bedtime to finish your antibiotic 

course for pneumonia.  You have 2 doses remaining in the course, with the first 

dose to be taken on the evening of 3/16.





*Take fluconazole 200 mg by mouth daily at bedtime for your rash.  You will 

need a total of 14 days of treatment.  You have 11 doses remaining, with the 

first dose to be taken this evening on 3/16.





*You may use albuterol nebulizers up to every 6 hours as needed for shortness 

of breath or wheezing.





*You may take Mucinex 600 mg by mouth twice a day for the next 5 days to help 

with your productive cough.





Follow up:





*You have been scheduled to follow-up with Dr. Kocher (cardiology) on April 3 

at 2:45 PM.





*You have also been scheduled to follow up with your primary care provider, Dr. Talavera, on March 22 at 10 AM.








Please seek medical attention if you experience fevers, chills, sweats, chest 

pain, shortness of breath, lightheadedness, loss of consciousness, nausea, 

vomiting, numbness, or tingling.


 (Alexandra Phelps ., PADonaC)


Instructions / Follow-Up


Call your Primary Care doctor if any of the following symptoms or problems 

start or get worse:





* Shortness of breath or difficulty breathing


* Wake up at night short of breath


* Chest pain


* Cough


* Swelling of your hands, feet, or legs


* More fatigued or tired with your normal activity


* Palpitations - sudden fast heart beats





WEIGHT





* Weigh yourself every morning after using the bathroom.


* Use the same scale.


* Wear the same amount of clothing.


* Write your weight down on a chart.


* Call your Primary Care doctor if you gain more than 2-3 pounds in 1-2 days.





MEDICATIONS





* Use this discharge instruction sheet for medication instructions.


* Take your medications at the time your doctor ordered.


* Do not skip a dose of your medicines.


* If you miss a dose of medicine, take it as soon as possible, but DO NOT 

DOUBLE A DOSE.


* Read your medicine information when you get home.


* Know all of the side effects of your medicine.  If in doubt, ask your 

pharmacist


* Call your Primary Care doctor's office if you have any side effects.


* Be sure all of your doctors know what medicine and herbs you take (including 

cold, flu, and herbal medicine).








Take the following with you to your follow-up doctor appointments:





* Weight Chart


* Medication List


* List of questions





Do not drink excessive alcohol, beer or wine.





 (Violeta Wolff MD)


Current Hospital Diet


Patient's current hospital diet: Low Sodium Diet (2gm Na)


  


 (Alexandra Phelps PA-C)





Discharge Diet


Recommended Diet:  Low Sodium Diet (2gm Na)


 (Alexandra Phelps PA-C)


Fluid Restriction:  1800 ml (7 cups)


 (Violeta Wolff MD)


Procedures


Procedures Performed:  


Echocardiogram, Cardiolite myocardial perfusion scan


 (Alexandra Phelps PA-C)





Pending Studies


Studies pending at discharge:  no


 (Alexandra Phelps PA-C)





Medical Emergencies








.


Who to Call and When:





Medical Emergencies:  If at any time you feel your situation is an emergency, 

please call 911 immediately.





.


 (Alexandra Phelps PA-C)





Non-Emergent Contact


Non-Emergency issues call your:  Primary Care Provider, Cardiologist


Call Non-Emergent contact if:  you have a fever, your pain is worsening, your 

pain is unusual for you, your pain is concerning you, you have any medication 

questions





.


 (Alexandra Phelps PA-C)





Past History


Medical & Surgical History:  


(1) Community acquired pneumonia


(2) CHF exacerbation


(3) Atrial fibrillation (Alexandra Phelps PA-C)


.





"Provider Documentation" section prepared by Alexandra Phelps.


 (Alexandra Phelps, KENNY)





VTE Core Measure


Inpt VTE Proph given/why not?:  Unfractionated heparin SQ, T.E.D. Stockings, SCD

's


 (Alexandra Phelps ., PA-C)

## 2017-03-16 NOTE — DIAGNOSTIC IMAGING REPORT
CHEST 2 VIEWS ROUTINE



CLINICAL HISTORY: f/u CHF, Pneumonia dyspnea



COMPARISON STUDY:  3/13/2017



FINDINGS: Moderate stable cardiomegaly. Unchanging infiltrative process left

base and quickly medial right base. Slight improvement in blunting left lateral

gastric angle. Similar subtle improvement is seen at the right base laterally. 



IMPRESSION:  Slight improvement compared to the prior exam. Persistent bibasilar

infiltrative change 









Electronically signed by:  Luis Fenton M.D.

3/16/2017 7:31 AM



Dictated Date/Time:  3/16/2017 7:30 AM

## 2017-03-16 NOTE — DISCHARGE SUMMARY
Discharge Summary


Date of Service


Mar 16, 2017.


 (Alexandra Phelps PA-C)





Discharge Summary


Admission Date:


Mar 13, 2017 at 18:49


Discharge Date:  Mar 16, 2017


Discharge Disposition:  Home (24 hour care with family support)


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


Problems/Secondary Diagnoses:


Atrial fibrillation





HTN





BPH





H/o PUD w/GI bleed





Patient atrial foramen





Diverticulosis


Immunizations:  


   Have You Had Influenza Vaccine:  No


   History of Tetanus Vaccine?:  Unknown


   History of Pneumococcal:  Yes


   History of Hepatitis B Vaccine:  Unknown


Procedures:














  ECHOCARDIOGRAM REPORT


 


 


 


 [place], [place]


 


 


 


Patient:  MARIE RENO Admit Date: 


 


Med Rec:  [id number] Location: [facility]


 


Acct ID:  [id number] Room/Bed:  [facility]


 


YOB: 1931 Sex:  M Report #:  [id number]


 


Age:  86     Test:  


 


Fam Phy:  Liliana Talavera M.D. Technician:  


 


Att Phy:  Violeta Wolff MD Diagnosis:  SHORTNESS OF BREATH


 


Fatemeh Phy:  Liliana Talavera M.D. Admit Phy:  Romaine Malloy M.D.


 


 Interpreting Phy:  Christopher W Kocher MD


 


 Ordering Phy:  


 


CC: Kocher, Christopher W., MD Tussey, Natalie B., MD


 


 Endcc:





 

















[~ rep ct labl]         Page 5 of 5 p: [~ rep prt dt last] [~ rep prt tm last]


 


  


 


 





  














 ECHOCARDIOGRAM REPORT


 


 


 


 [place]hel[place], [place]


 


 


 


Patient:  MARIE RENO Admit Date: 


 


Med Rec:  [id number] Location: [facility]


 


Acct ID:  [id number] Room/Bed:  [facility]


 


YOB: 1931 Sex:  M Report #:  [id number]


 


Age:  86     Test:  


 


Fam Phy:  Liliana Talavera M.D. Technician:  


 


Att Phy:  Violeta Wolff MD Diagnosis:  SHORTNESS OF BREATH


 


Fatemeh Phy:  Liliana Talavera M.D. Admit Phy:  Romaine Malloy M.D.


 


 Interpreting Phy:  Christopher W Kocher MD


 


 Ordering Phy:  


 


CC: Kocher, Christopher W., MD Tussey, Natalie B., MD


 


 Endcc:


 














[~ rep ct labl]         Page 1 of 1 p: [~ rep prt dt last] [~ rep prt tm last]


 


  


 


 


  








*NOTICE TO RECEIVING PARTY AGENCY**  This information is strictly Confidential 

and protected under [place] law.  [place] law prohibits you from 

making any further disclosure of this information unless further disclosure is 

expressly permitted by the written consent of the person to whom it pertains or 

is authorized by law.  A general authorization for the release of medical or 

other information is not sufficient for this purpose.  Hospital accepts no 

responsibility if the information is made available to any other person, 

INCLUDING THE PATIENT.





Interpretation Summary


* Name: MARIE RENO  Study Date: 2017 07:24 AM  BP: 122/65 mmHg


* MRN: [id number]  Patient Location: [facility]\S\07\S\1  HR: 57


* : 1931 (M/d/yyyy)  Gender: Male  Height: 67 in


* Age: 86 yrs  Ethnicity: [place]  Weight: 187 lb


* Ordering Physician: Constanza Saha PA-C


* Referring Physician: Self, Referred


* Performed By: [facility]


* Accession# [id number]  Account# [id number]


* Reason For Study: CHF


* BSA: 2.0 m2


* -- Conclusions --


* There is moderate concentric left ventricular hypertrophy.


* Left ventricular systolic function is normal.


* The right ventricular systolic function is mildly reduced.


* The left atrium is mildly dilated.


* The right atrium is mildly dilated.


* Mild to moderate aortic regurgitation.


* Right ventricular systolic pressure is elevated at 30-40mmHg.


* The inferior vena cava is mildly dilated.


* Mild aortic root dilatation.


* Moderate size pericardial effusion.


Procedure Details


* A complete two-dimensional transthoracic echocardiogram was performed (2D, M-

mode, Doppler and color flow Doppler).


Left Ventricle


* The left ventricle is grossly normal size.


* There is moderate concentric left ventricular hypertrophy.


* Ejection Fraction = 50-55%.


* Left ventricular systolic function is normal.


Right Ventricle


* The right ventricle is grossly normal size.


* The right ventricular systolic function is mildly reduced.


Atria


* The left atrium is mildly dilated.


* The right atrium is mildly dilated.


Mitral Valve


* The mitral valve is grossly normal.


* Significant mitral regurgitation is absent.


Tricuspid Valve


* The tricuspid valve is not well visualized, but is grossly normal.


* There is mild tricuspid regurgitation.


* Right ventricular systolic pressure is elevated at 30-40mmHg.


Aortic Valve


* Aortic valve sclerosis mild, without significant aortic valvular stenosis.


* No hemodynamically significant valvular aortic stenosis.


* Mild to moderate aortic regurgitation.


Great Vessels


* Mild aortic root dilatation.


Pericardium/Pleural


* Moderate size pericardial effusion.


Great Vessels


* The inferior vena cava is mildly dilated.


Left Ventricular Diastolic Function


* Not evaluated due to arrhythmia





MMode 2D Measurements and Calculations











IVSd 1.6 cm


IVSs 1.7 cm


  LVIDd 4.5 cm


LVIDs 3.2 cm


LVPWd 1.5 cm


LVPWs 2.1 cm


  IVS/LVPW 1.1 


FS 27.8 %


EDV(Teich) 91.5 ml


ESV(Teich) 42.1 ml


EF(Teich) 54.0 %


  EDV(cubed) 89.9 ml


ESV(cubed) 33.9 ml


EF(cubed) 62.3 %


% IVS thick 6.6 %


% LVPW thick 35.6 %


 


 


LV mass(C)d 297.8 grams


LV mass(C)dI 151.6 grams/m\S\2


LV mass(C)s 265.0 grams


LV mass(C)sI 134.9 grams/m\S\2


  CO(Teich) 2.8 l/min


CI(Teich) 1.4 l/min/m\S\2


SV(Teich) 49.4 ml


SI(Teich) 25.1 ml/m\S\2


CO(cubed) 3.2 l/min


CI(cubed) 1.6 l/min/m\S\2


SV(cubed) 56.0 ml


SI(cubed) 28.5 ml/m\S\2


  Ao root diam 4.2 cm


Ao root area 13.6 cm\S\2


ACS 1.5 cm


LA dimension 4.2 cm


  LA/Ao 1.0 


 


 


LVAd ap4 24.7 cm\S\2


LVLd ap4 8.4 cm


EDV(MOD-sp4) 59.0 ml


LVAs ap4 13.5 cm\S\2


LVLs ap4 7.2 cm


ESV(MOD-sp4) 23.0 ml


EF(MOD-sp4) 61.0 %


  LVAd ap2 18.4 cm\S\2


LVLd ap2 8.0 cm


EDV(MOD-sp2) 35.0 ml


LVAs ap2 12.0 cm\S\2


LVLs ap2 6.9 cm


ESV(MOD-sp2) 17.0 ml


EF(MOD-sp2) 51.4 %


  CO(MOD-sp4) 2.1 l/min


CI(MOD-sp4) 1.0 l/min/m\S\2


SV(MOD-sp4) 36.0 ml


SI(MOD-sp4) 18.3 ml/m\S\2


  CO(MOD-sp2) 1.0 l/min


CI(MOD-sp2) 0.52 l/min/m\S\2


SV(MOD-sp2) 18.0 ml


SI(MOD-sp2) 9.2 ml/m\S\2


 








Doppler Measurements and Calculations











MV E max shanae 98.2 cm/sec


  MV P1/2t max shanae 98.7 cm/sec


MV P1/2t 52.7 msec


MVA(P1/2t) 4.2 cm\S\2


MV dec slope 548.2 cm/sec\S\2


MV dec time 0.20 sec


  Ao V2 max 103.5 cm/sec


Ao max PG 4.4 mmHg


Ao max PG (full) 2.0 mmHg


  AI max shanae 308.9 cm/sec


AI max PG 38.2 mmHg


AI dec slope 127.9 cm/sec\S\2


AI P1/2t 707.6 msec


 


 


LV V1 max PG 2.4 mmHg


  LV V1 max 77.3 cm/sec


  PA V2 max 89.3 cm/sec


PA max PG 3.2 mmHg


  PI max shanae 213.6 cm/sec


PI max PG 18.2 mmHg


PI dec slope 99.7 cm/sec\S\2


PI P1/2t 627.5 msec


 


 


TR max shanae 261.9 cm/sec


    














Created:   


 


Initialized:  17; 1016 <Electronically signed by Christopher W Kocher MD>


 


Signed:  17 1703 ______________________________________


 


  Christopher W Kocher MD














 The status of this report is Signed.


 


Draft = Not yet reviewed or approved by Cardiologist.


 


Signed = Reviewed and approved by Cardiologist.


 





























[address] [place] [address]


 


      Phone:  [phone] 








Performing Location:  [facility]











Patient Name:  MARIE RENO        Dictating Provider:  Toro Franks MD   


 


Medical Record Number:  [id number] Dictation Date:  17 1053


 


Account Number:  [id number] Report Signed By: 


 


YOB: 1931 :  ERI SCRIPT


 


Room/Bed:  [facility] Family Physician:  Liliana Talavera M.D.


 


SC:  ИРИНА Primary Care Physician: Liliana Talavera M.D.


 


Adm Date:  17 Attending Physician:  Violeta Wolff MD


 


Dis Date:   Admitting Physician:  Romaine Malloy M.D.


 


 Ordering Physician:  








REQUESTING PHYSICIAN:  Dr. Wolff.


 


PRIMARY CARE PHYSICIAN:  Dr. Talavera.


 


CONSULTING CARDIOLOGIST:  Dr. Kocher.


 


REASON FOR STUDY:  Mild troponin elevation.


 


ONE-DAY NUCLEAR MEDICINE TECHNETIUM-99M CARDIOLITE MYOCARDIAL PERFUSION SCAN


 


EKG:  Baseline showed atrial fibrillation/flutter with left axis deviation


and old septal infarct.  No ST abnormalities.


 


Stress EKG with regadenoson, heart rate heide from 57 to 80 (59% maximum


predicted heart rate), blood pressure stable at 151/71.  There were no


stress-induced ST changes or arrhythmias.


 


TECHNIQUE:  For the stress portion of the study, 31.0 mCi of technetium-99m


Cardiolite IV was injected at 12:15 p.m. on 03/15/2017.  Thirty minutes


following the injection, imaging of the heart was performed in multiple


projections.  For the rest portion of the study, 10.8 mCi of technetium-99m


Cardiolite was injected at 10:15 a.m.  One hour following the injection,


imaging of the heart was performed in the same projections.


 


FINDINGS:


RAW IMAGES:  Raw images were reviewed in detail.  Imaging was completed with


arms at his sides.  There was mild vertical motion on both the stress and


rest images.  There was evidence of diaphragmatic shadow.  There was minimal


GI uptake but this was not interfering with the inferior aspect of the heart.


 No significant pathologic extracardiac uptake.


 


The short axis, vertical long axis, and horizontal long axis images were


reviewed in detail.  There was no evidence of TID.  There was a small, mild,


primarily fixed perfusion defect involving the apical septum.  In addition,


there was also a small, mild fixed inferolateral defect which appeared most


consistent with artifact, either from diaphragmatic attenuation or imaging


with arms at sides.


 


LV was normal size with an end-diastolic volume of 74 mL.  Normal LV function


with an EF of 52%.  There was mild apical hypokinesis, but no other


significant regional wall motion abnormalities including the inferolateral


wall.


 


IMPRESSION:


1.  Small, mild fixed perfusion defect involving the apical septum, most


consistent with prior distal left anterior descending artery infarct.  No


significant residual ischemia.


2.  Small inferolateral fixed perfusion defect, most likely consistent with


imaging artifact.


3.  Preserved left ventricular function with an EF of 52%.  There is mild


apical septal hypokinesis.  Normal left ventricular size with an


end-diastolic volume of 74 mL.


4.  Nondiagnostic regadenoson stress EKG with no stress-induced ST


abnormalities or arrhythmias.


 


 


 





 (Alexandra Phelps, PA-C)


Problems/Secondary Diagnoses:


Mild to moderate aortic regurgitation


Suggested Pulmonary HTN: Right ventricular systolic pressure elevated at 30-40 

mmHg


Moderate pericardial effusion


Elevated troponin secondary to demand ischemia


Tinea corporis


 (Violeta Wolff MD)





Medication Reconciliation


New Medications:  


Albuterol Sulf (Albuterol Sulfate) 2.5 Mg/3 Ml Nebu


3 ML INH QIDR PRN for SOB/Wheezing for 30 Days, #120 DOSE


Use 1 treatment up to every 6 hours as needed for shortness of breath


 or wheezing.


Levofloxacin (Levaquin) 750 Mg Tab


750 MG PO QPM for 2 Days, #2 TAB


Take 1 tablet by mouth at bedtime for 2 days.  First dose evening of


 3/16.


Pantoprazole (Pantoprazole Sodium) 40 Mg Tab


40 MG PO QAM for 30 Days, #30 TABS


Take 1 tablet by mouth every morning.


Apixaban (Eliquis) 5 Mg Tab


5 MG PO BID for 30 Days, #60 TAB


Take 1 tablet by mouth twice a day.


Fluconazole (Fluconazole) 100 Mg Tab


200 MG PO QPM for 11 Days, #11 TAB


Take 1 tablet by mouth at bedtime for 11 days.  First dose evening of


 3/16.


Guaifenesin Ext Rel (Mucinex Ext Rel) 600 Mg Tabcr


600 MG PO Q12 for 5 Days, #10 TABS


Take 1 tablet by mouth twice a day.


 


Changed Medications:  


Furosemide (Lasix) 40 Mg Tab


40 MG PO DAILY for 30 Days, #30 TAB (Medication details modified)


Take 1 tablet by mouth daily.


Metoprolol Succ (Toprol Xl) (Toprol-Xl) 25 Mg Tabcr


12.5 MG PO QAM for 30 Days, #15 TABS (Changed from: 25 MG)


Take 0.5 tablet by mouth every morning.


 


Continued Medications:  


Cholecalciferol (Vitamin D3) 400 Unit Cap


400 INTUNIT PO HS





Docusate Sodium (Docusate Sodium) 100 Mg Cap


1 CAP PO BID for 7 Days, #14 CAP





Finasteride (Finasteride) 5 Mg Tab


5 MG PO QAM





Lisinopril (Zestril) 10 Mg Tab


10 MG PO BID





Ocuvite Preservision (Ocuvite Preservision) 1 Tab Tab


1 TAB PO BID, TAB





Senna (Senokot) 8.6 Mg Tab


1 TAB PO BID





Tamsulosin Hcl (Flomax) 0.4 Mg Cap


0.4 MG PO HS, CAP





Tramadol (Ultram) 50 Mg Tab


50 MG PO Q6H PRN for Pain, TAB











Referrals At Discharge


Follow up Referrals:  


Cardiologist Referral - Within 1-2 Weeks with Kocher, Christopher W., MD Follow 

up in 10 days


Family Practice Referral - Within 1 Week with Liliana Talavera M.D.








Discharge Exam


Patient reports feeling well.  He denies any shortness of breath.  He states 

that his cough is better although he still is producing some clear mucus.  He 

denies any orthopnea.  The patient denies fevers, chills, sweats, chest pain, 

palpitations, claudication, wheezing, shortness of breath, nausea, vomiting, 

abdominal pain, dysuria, hematuria, urinary retention, paralysis, weakness, 

numbness and tingling.


Review of Systems:  


   Constitutional:  No chills, No fever, No sweats


   Eyes:  No diplopia, No eye pain, No worsening of vision


   ENT:  No hearing loss, No sore throat, No trouble swallowing


   Respiratory:  + cough, + sputum (little production, clear), No shortness of 

breath, No wheezing


   Cardiovascular:  No chest pain, No claudication, No orthopnea, No 

palpitations


   Abdomen:  No nausea, No pain, No vomiting


   Musculoskeletal:  No calf pain, No joint pain, No muscle pain


   Genitourinary - Male:  No dysuria, No hematuria, No urinary retention


   Neurologic:  No numbness/tingling, No paralysis, No weakness


   Integumentary:  + rash, No color change, No itch


Physical Exam:  


   General Appearance:  WD/WN, no apparent distress


   Eyes:  normal inspection, PERRL, EOMI


   ENT:  normal ENT inspection, hearing grossly normal, pharynx normal


   Neck:  supple, no JVD, trachea midline


   Respiratory/Chest:  lungs clear (no crackles), normal breath sounds, no 

respiratory distress, + decreased breath sounds (bases)


   Cardiovascular:  no edema, no murmur, + irregularly irregular (rate 

controlled)


   Abdomen / GI:  normal bowel sounds, non tender, soft


   Extremities:  normal inspection, no calf tenderness, + pedal edema (1+ 

pitting edema)


   Neurologic/Psychiatric:  alert, normal mood/affect, oriented x 3


   Skin:  normal color, warm/dry, no rash


 (Alexandra Phelps ., PA-C)





Hospital Course


87 y/o male with a history of chronic diastolic CHF, recent influenza A, patent 

atrial foramen, diverticulosis, hypertension, BPH and h/o PUD with GI bleeding 

who presented to the ED with shortness of breath that has been worsening over 

the last 2 weeks.  CXR shows bilateral pleural effusions bibasilar opacities.  

Cardiac enzymes elevated upon arrival.





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


-Admit to telemetry


-Continue Lasix 40 mg IV qd.  Received 1 dose prior to discharge.


-Discharged on Lasix 40 mg PO qd


-BNP elevated 3462


-Echo:  Moderate LVH, right ventricular systolic function mildly reduced, L and 

R atria mildly dilated.  Mild to moderate aortic regurgitation.  Right 

ventricular systolic pressure elevated at 30-40 mmHg.  Moderate pericardial 

effusion


-Cardiology consulted, appreciate recs:  Pericardial effusion appears to be 

chronic as a similar effusion was noted on CT one year ago.  Monitor for now.  

Okay to resume metoprolol at 12.5 mg PO qd.  Nuclear perfusion scan showed no 

significant new ischemic changes, low risk.





Presumed community acquired pneumonia--still c/o productive cough, SOB.  Little 

air movement, lungs diminished throughout


-Influenza negative.  Pertussis studies negative


-Continue Rocephin 1 gm IV qd


-Decrease azithromycin 250 mg IV qd, total of 5 days abx.  For discharge, pt 

switched to Levaquin 750 mg PO qd for 2 more days to complete total 5 day 

course.


-Chest x-ray reviewed as above on 3/13


-Repeat CXR 3/16 shows slight improvement, however pt feeling much better


-Duonebs QIDR and q2h prn SOB/wheezing.  Pt may continue with albuterol nebs 

QIDR prn SOB/wheezing, script given


-Mucinex 600 mg PO BID.  pt may continue for next 5 days, script given


-2 Step exercise test done, no need for home oxygen





A-fib/a flutter with slow ventricular response--per patient records, patient 

was in a regular rhythm 2 weeks ago


-Monitored on telemetry.  A-flutter, HR in 60s-70s overnight prior to discharge


-Resume metoprolol at lower dose.  Metoprolol succinate 12.5 mg PO qd


-Spoke with Dr. Case with GI, pt cleared for anticoagulation as PUD has healed


-Start Eliquis 5 mg PO BID


-Protonix 40 mg PO qd per GI while on anticoagulation





Elevated troponin--NSTEMI vs demand ischemia?  No chest pain, no WMA on echo, 

no ischemic changes on EKG


-Troponins trending up


-Repeat EKG shows 53 bpm, A. fib


-Repeat troponin 3/14 down to 0.265


-Perfusion scan no new changes





Tinea corporis


-D/C IV fluconazole, switch to fluconazole 200 mg PO daily. Received 3 doses 

inpatient.  Total 14 day course, script for remaining 11 days given.





Hypertension


-Continue lisinopril 10 mg PO BID


-Metoprolol as above





BPH


-Continue on Flomax 0.4 mg





DVT prophylaxis


-Heparin 5000 units SC q8h


-ESME hose and SCDs





Code Status


-Level I, FULL RESUSCITATION STATUS





Dispo


-Pt from home


-PT recommend 24 hour assistance, pt refuses home health services.  Family 

support will provide 24 hour assistance.


Total Time Spent:  Greater than 30 minutes


This includes examination of the patient, discharge planning, medication 

reconciliation, and communication with other providers.


 (Alexandra Phelps ., PA-C)





Discharge Instructions


Please refer to the electronic Patient Visit Report (Discharge Instructions) 

for additional information.


 (Alexandra Phelps PA-C)





Additional Copies To


Liliana Talavera M.D.; Nik Cardoza MD





Reviewed:  Pt Seen/Exam by Me


 (Violeta Wolff MD)


History


Physician Assistant Supervision Note:


I interviewed and examined the patient. Discussed with ANTONIO Phelps and agree 

with findings and plan as documented in the note. Any exceptions or 

clarifications are listed here: 





Pt feeling much better, less SOB, still with cough not producing much. No O2 

requirement with ambulation





Vitals and tele reviewed


NAD, AAOx3


Irreg irreg, normal rate, I cannot auscultate a murmur


+bibasilar crackles much improved from yesterday, moving air well through rest 

of lungs


Abd soft NT ND +BS


Ext trace pitting edema legs bilat


Skin with diffuse erythematous crusty rash with central clearing, sharply 

demarcated borders





87 yo male with A-fib with slow ventricular response, acute on chronic combined 

systolic/diastolic CHF, demand ischemia, and suspected PNA. Much improved and 

ready for discharge.


Nuc Stress with small apical fixed defect.





-po abx for PNA and tinea corporis


-continue lasix 40mg po daily at home


-cut Toprol XL to 12.5 for slow ventricular response in A-fib 


-repeat CXR as outpatient to resolution after discharge


-f/u Cardiology and PCP





Documented By:  Violeta Wolff


 (Violeta Wolff MD)

## 2017-03-16 NOTE — MYOCARDIAL PERFUSION SCAN
REQUESTING PHYSICIAN:  Dr. Wolff.

 

PRIMARY CARE PHYSICIAN:  Dr. Talavera.

 

CONSULTING CARDIOLOGIST:  Dr. Kocher.

 

REASON FOR STUDY:  Mild troponin elevation.

 

ONE-DAY NUCLEAR MEDICINE TECHNETIUM-99M CARDIOLITE MYOCARDIAL PERFUSION SCAN

 

EKG:  Baseline showed atrial fibrillation/flutter with left axis deviation

and old septal infarct.  No ST abnormalities.

 

Stress EKG with regadenoson, heart rate heide from 57 to 80 (59% maximum

predicted heart rate), blood pressure stable at 151/71.  There were no

stress-induced ST changes or arrhythmias.

 

TECHNIQUE:  For the stress portion of the study, 31.0 mCi of technetium-99m

Cardiolite IV was injected at 12:15 p.m. on 03/15/2017.  Thirty minutes

following the injection, imaging of the heart was performed in multiple

projections.  For the rest portion of the study, 10.8 mCi of technetium-99m

Cardiolite was injected at 10:15 a.m.  One hour following the injection,

imaging of the heart was performed in the same projections.

 

FINDINGS:

RAW IMAGES:  Raw images were reviewed in detail.  Imaging was completed with

arms at his sides.  There was mild vertical motion on both the stress and

rest images.  There was evidence of diaphragmatic shadow.  There was minimal

GI uptake but this was not interfering with the inferior aspect of the heart.

 No significant pathologic extracardiac uptake.

 

The short axis, vertical long axis, and horizontal long axis images were

reviewed in detail.  There was no evidence of TID.  There was a small, mild,

primarily fixed perfusion defect involving the apical septum.  In addition,

there was also a small, mild fixed inferolateral defect which appeared most

consistent with artifact, either from diaphragmatic attenuation or imaging

with arms at sides.

 

LV was normal size with an end-diastolic volume of 74 mL.  Normal LV function

with an EF of 52%.  There was mild apical hypokinesis, but no other

significant regional wall motion abnormalities including the inferolateral

wall.

 

IMPRESSION:

1.  Small, mild fixed perfusion defect involving the apical septum, most

consistent with prior distal left anterior descending artery infarct.  No

significant residual ischemia.

2.  Small inferolateral fixed perfusion defect, most likely consistent with

imaging artifact.

3.  Preserved left ventricular function with an EF of 52%.  There is mild

apical septal hypokinesis.  Normal left ventricular size with an

end-diastolic volume of 74 mL.

4.  Nondiagnostic regadenoson stress EKG with no stress-induced ST

abnormalities or arrhythmias.

## 2017-04-03 ENCOUNTER — HOSPITAL ENCOUNTER (OUTPATIENT)
Dept: HOSPITAL 45 - C.LAB1850 | Age: 82
Discharge: HOME | End: 2017-04-03
Attending: INTERNAL MEDICINE
Payer: COMMERCIAL

## 2017-04-03 DIAGNOSIS — I31.3: Primary | ICD-10-CM

## 2017-04-03 LAB
ANION GAP SERPL CALC-SCNC: 7 MMOL/L (ref 3–11)
BUN SERPL-MCNC: 14 MG/DL (ref 7–18)
BUN/CREAT SERPL: 14.4 (ref 10–20)
CALCIUM SERPL-MCNC: 10.3 MG/DL (ref 8.5–10.1)
CHLORIDE SERPL-SCNC: 102 MMOL/L (ref 98–107)
CO2 SERPL-SCNC: 31 MMOL/L (ref 21–32)
CREAT SERPL-MCNC: 1 MG/DL (ref 0.6–1.4)
GLUCOSE SERPL-MCNC: 108 MG/DL (ref 70–99)
POTASSIUM SERPL-SCNC: 3.3 MMOL/L (ref 3.5–5.1)
SODIUM SERPL-SCNC: 140 MMOL/L (ref 136–145)

## 2017-06-22 ENCOUNTER — HOSPITAL ENCOUNTER (OUTPATIENT)
Dept: HOSPITAL 45 - C.LABPVFM | Age: 82
Discharge: HOME | End: 2017-06-22
Attending: FAMILY MEDICINE
Payer: COMMERCIAL

## 2017-06-22 DIAGNOSIS — I50.9: ICD-10-CM

## 2017-06-22 DIAGNOSIS — R06.02: ICD-10-CM

## 2017-06-22 DIAGNOSIS — I48.91: ICD-10-CM

## 2017-06-22 DIAGNOSIS — J98.11: Primary | ICD-10-CM

## 2017-06-22 DIAGNOSIS — E21.3: ICD-10-CM

## 2017-06-22 DIAGNOSIS — E83.52: ICD-10-CM

## 2017-06-22 DIAGNOSIS — I51.7: ICD-10-CM

## 2017-06-22 DIAGNOSIS — I10: ICD-10-CM

## 2017-06-22 LAB
ALBUMIN/GLOB SERPL: 1.2 {RATIO} (ref 0.9–2)
ALP SERPL-CCNC: 83 U/L (ref 45–117)
ALT SERPL-CCNC: 22 U/L (ref 12–78)
ANION GAP SERPL CALC-SCNC: 8 MMOL/L (ref 3–11)
AST SERPL-CCNC: 24 U/L (ref 15–37)
BUN SERPL-MCNC: 20 MG/DL (ref 7–18)
BUN/CREAT SERPL: 16.4 (ref 10–20)
CALCIUM SERPL-MCNC: 11.1 MG/DL (ref 8.5–10.1)
CHLORIDE SERPL-SCNC: 101 MMOL/L (ref 98–107)
CO2 SERPL-SCNC: 28 MMOL/L (ref 21–32)
CREAT SERPL-MCNC: 1.2 MG/DL (ref 0.6–1.4)
GLOBULIN SER-MCNC: 3 GM/DL (ref 2.5–4)
GLUCOSE SERPL-MCNC: 95 MG/DL (ref 70–99)
POTASSIUM SERPL-SCNC: 4 MMOL/L (ref 3.5–5.1)
SODIUM SERPL-SCNC: 137 MMOL/L (ref 136–145)

## 2017-06-22 NOTE — DIAGNOSTIC IMAGING REPORT
CHEST 2 VIEWS ROUTINE



CLINICAL HISTORY: SOB CONGESTIVE FAILURE. PNEUMONIA.



COMPARISON STUDY:  3/16/2017



FINDINGS: The heart is enlarged. There is enlarging right pleural effusion and

small left pleural effusion. There is right lower lobe

atelectasis/consolidation. There are linear left basilar opacities, likely

atelectatic. There is no current evidence for overt failure.[ 



IMPRESSION: Cardiomegaly. Bilateral pleural effusions right larger than left.

Right lower lobe atelectasis/consolidation.







Electronically signed by:  Eduardo Duong M.D.

6/22/2017 3:22 PM



Dictated Date/Time:  6/22/2017 3:21 PM

## 2017-10-02 ENCOUNTER — HOSPITAL ENCOUNTER (OUTPATIENT)
Dept: HOSPITAL 45 - C.RAD1850 | Age: 82
Discharge: HOME | End: 2017-10-02
Attending: INTERNAL MEDICINE
Payer: COMMERCIAL

## 2017-10-02 DIAGNOSIS — J90: ICD-10-CM

## 2017-10-02 DIAGNOSIS — I51.7: ICD-10-CM

## 2017-10-02 DIAGNOSIS — I50.9: Primary | ICD-10-CM

## 2017-10-02 LAB
ANION GAP SERPL CALC-SCNC: 8 MMOL/L (ref 3–11)
BUN SERPL-MCNC: 23 MG/DL (ref 7–18)
BUN/CREAT SERPL: 19 (ref 10–20)
CALCIUM SERPL-MCNC: 10.7 MG/DL (ref 8.5–10.1)
CHLORIDE SERPL-SCNC: 99 MMOL/L (ref 98–107)
CO2 SERPL-SCNC: 30 MMOL/L (ref 21–32)
CREAT SERPL-MCNC: 1.2 MG/DL (ref 0.6–1.4)
EOSINOPHIL NFR BLD AUTO: 164 K/UL (ref 130–400)
GLUCOSE SERPL-MCNC: 89 MG/DL (ref 70–99)
HCT VFR BLD CALC: 44.6 % (ref 42–52)
MCH RBC QN AUTO: 30.9 PG (ref 25–34)
MCHC RBC AUTO-ENTMCNC: 33.9 G/DL (ref 32–36)
MCV RBC AUTO: 91.4 FL (ref 80–100)
PMV BLD AUTO: 10.6 FL (ref 7.4–10.4)
POTASSIUM SERPL-SCNC: 4.1 MMOL/L (ref 3.5–5.1)
RBC # BLD AUTO: 4.88 M/UL (ref 4.7–6.1)
SODIUM SERPL-SCNC: 137 MMOL/L (ref 136–145)
TSH SERPL-ACNC: 0.93 UIU/ML (ref 0.3–4.5)
WBC # BLD AUTO: 7.28 K/UL (ref 4.8–10.8)

## 2017-10-02 NOTE — DIAGNOSTIC IMAGING REPORT
CHEST 2 VIEWS ROUTINE



HISTORY: Short of breath.  I50.9 Congestive heart [id number]



COMPARISON: Chest 6/22/2017.



FINDINGS: No pneumothorax. Small to moderate right pleural effusion persist.

Small to moderate left pleural effusion has increased in size. Mild central

pulmonary vascular congestion without overt edema. The heart remains mildly

enlarged. Bibasilar densities favor atelectasis from the pleural effusions. No

new focal lung consolidations.



IMPRESSION:



1. Increase in size in the small to moderate left pleural effusion. The small to

moderate right pleural effusion persists.

2. Mild central pulmonary vascular congestion without overt edema.

3. Cardiomegaly, unchanged.







Electronically signed by:  Faustino Kerns M.D.

10/2/2017 11:58 AM



Dictated Date/Time:  10/2/2017 11:57 AM

## 2017-11-13 ENCOUNTER — HOSPITAL ENCOUNTER (OUTPATIENT)
Dept: HOSPITAL 45 - C.LAB | Age: 82
Discharge: HOME | End: 2017-11-13
Attending: INTERNAL MEDICINE
Payer: COMMERCIAL

## 2017-11-13 DIAGNOSIS — Z01.812: Primary | ICD-10-CM

## 2017-11-13 LAB
ANION GAP SERPL CALC-SCNC: 7 MMOL/L (ref 3–11)
BUN SERPL-MCNC: 30 MG/DL (ref 7–18)
BUN/CREAT SERPL: 20.8 (ref 10–20)
CALCIUM SERPL-MCNC: 11.2 MG/DL (ref 8.5–10.1)
CHLORIDE SERPL-SCNC: 98 MMOL/L (ref 98–107)
CO2 SERPL-SCNC: 32 MMOL/L (ref 21–32)
CREAT SERPL-MCNC: 1.45 MG/DL (ref 0.6–1.4)
EOSINOPHIL NFR BLD AUTO: 119 K/UL (ref 130–400)
GLUCOSE SERPL-MCNC: 91 MG/DL (ref 70–99)
HCT VFR BLD CALC: 47.6 % (ref 42–52)
INR PPP: 1.2 (ref 0.9–1.1)
MCH RBC QN AUTO: 31.9 PG (ref 25–34)
MCHC RBC AUTO-ENTMCNC: 33.2 G/DL (ref 32–36)
MCV RBC AUTO: 96.2 FL (ref 80–100)
PARTIAL THROMBOPLASTIN RATIO: 1.2
PMV BLD AUTO: 10.6 FL (ref 7.4–10.4)
POTASSIUM SERPL-SCNC: 4.1 MMOL/L (ref 3.5–5.1)
PROTHROMBIN TIME: 13.3 SECONDS (ref 9–12)
RBC # BLD AUTO: 4.95 M/UL (ref 4.7–6.1)
SODIUM SERPL-SCNC: 137 MMOL/L (ref 136–145)
WBC # BLD AUTO: 6.46 K/UL (ref 4.8–10.8)

## 2017-11-21 ENCOUNTER — HOSPITAL ENCOUNTER (OUTPATIENT)
Dept: HOSPITAL 45 - C.CATH | Age: 82
Discharge: HOME | End: 2017-11-21
Attending: INTERNAL MEDICINE
Payer: COMMERCIAL

## 2017-11-21 VITALS
BODY MASS INDEX: 29.07 KG/M2 | WEIGHT: 185.19 LBS | HEIGHT: 67.01 IN | HEIGHT: 67.01 IN | WEIGHT: 185.19 LBS | BODY MASS INDEX: 29.07 KG/M2

## 2017-11-21 VITALS
HEART RATE: 56 BPM | OXYGEN SATURATION: 96 % | SYSTOLIC BLOOD PRESSURE: 113 MMHG | TEMPERATURE: 98.24 F | DIASTOLIC BLOOD PRESSURE: 70 MMHG

## 2017-11-21 VITALS — SYSTOLIC BLOOD PRESSURE: 118 MMHG | HEART RATE: 55 BPM | DIASTOLIC BLOOD PRESSURE: 59 MMHG | OXYGEN SATURATION: 94 %

## 2017-11-21 DIAGNOSIS — M10.9: ICD-10-CM

## 2017-11-21 DIAGNOSIS — I31.3: ICD-10-CM

## 2017-11-21 DIAGNOSIS — N13.8: ICD-10-CM

## 2017-11-21 DIAGNOSIS — N40.1: ICD-10-CM

## 2017-11-21 DIAGNOSIS — I48.91: ICD-10-CM

## 2017-11-21 DIAGNOSIS — Z79.899: ICD-10-CM

## 2017-11-21 DIAGNOSIS — Z79.01: ICD-10-CM

## 2017-11-21 DIAGNOSIS — I11.0: ICD-10-CM

## 2017-11-21 DIAGNOSIS — E87.6: ICD-10-CM

## 2017-11-21 DIAGNOSIS — D50.0: ICD-10-CM

## 2017-11-21 DIAGNOSIS — M81.0: ICD-10-CM

## 2017-11-21 DIAGNOSIS — E21.3: ICD-10-CM

## 2017-11-21 DIAGNOSIS — I50.30: Primary | ICD-10-CM

## 2017-11-21 DIAGNOSIS — Z87.891: ICD-10-CM

## 2017-11-21 NOTE — HISTORY & PHYSICAL BRIDGE NOTE
H&P Re-Evaluation


Bridge Note:


I have examined the patient, reviewed the History & Physical and in the 

interval since the performance of the History & Physical I have noted the 

following changes of clinical significance:Continues to be short of breath. 

Orthopnea. Leg edema No changes noted

## 2017-11-21 NOTE — PROCEDURE NOTE
Pre-Mod Sedation Assessment


General


Date of Moderate Sedation:


Nov 21, 2017.


Vital Signs:





Vital Signs Past 12 Hours








  Date Time  Temp Pulse Resp B/P (MAP) Pulse Ox O2 Delivery O2 Flow Rate FiO2


 


11/21/17 07:22 36.8 56 16 113/70 96 Room Air  











Review


Cardiovascular:  + irregularly irregular


Abdomen:  non tender


Airway Class:  III





Pre-Sedation Airway Assessment


Oral Cavity:  Dentures


Able to Visualize Vocal Cords:  No


Short Thick Neck:  No


Hx of Sleep Apnea:  No


Smoking Status:  Never Smoker


Mallampati Classification:  Class III


ASA Classification:  Class III





Procedure Planning


Contraindications-for Mod Sed:  None


Yes





Notes








The planned sedation has been discussed with the patient and consent obtained.  

I have


identified the patient, determined the appropriateness of sedation and have 

assessed the


patient immediately prior to the procedure.  





All medicine(s) and interventions are by my order.

## 2017-11-21 NOTE — [facility] OPERATIVE REPORT
Operative Report


Date of Service


Nov 21, 2017.





Operative Report


Procedure performed: Right heart catheterization


Staff cardiologist:  Christopher Kocher MD


Indication:  The patient is an 86-year-old gentleman with a history of 

diastolic heart failure who is persistent dyspnea.  He is also known to have a 

pericardial effusion.  Based on some concern regarding the etiology of his 

dyspnea he was advised to consider right heart catheterization and possible 

pericardiocentesis today





Procedure in detail





The patient was informed of the risks benefits and alternatives to the intended 

procedure.  He understood such which proceed.  He was taken to the cardiac 

catheterization suite in a fasting state.  The right internal jugular area was 

prepped and draped in usual sterile fashion.  The right internal jugular vein 

was subsequently access using modified Selinger technique under ultrasound 

guidance.  A 6 Upper sor[place] venous sheath was placed at the site over guidewire.  The 

sheath was used to facilitate passage of a balloon tip catheter for pressure 

measurements in the heart and pulmonary vasculature.  Once these measurements 

were taken the catheter and sheath were removed. Hemostasis was achieved at the 

access site using manual pressure.  The patient tolerated procedure well.  

There were no immediate complications.





Findings:





Right atrial pressure:  Mean of 9


Right ventricular pressure 37/15 with a mean of 17


Pulmonary artery pressure:  51/38 with a mean of 45


Pulmonary capillary wedge pressure:  Mean of 44





Impression:





Elevated pulmonary pressures suggestive of decompensated diastolic heart failure


I attest to the content of the Intraoperative Record and any orders documented 

therein.  Any exceptions are noted below.

## 2017-11-21 NOTE — DISCHARGE INSTRUCTIONS
Discharge Instructions


Procedure


Procedure Date:


Nov 21, 2017.


Reason for Visit:


Congestive Heart Failure.





Discharge


Discharge Date:


Nov 21, 2017.


Discharge Diagnosis:


Diastolic heart failure


Last Recorded Wt (Kilograms):  84





Medications


Stopped Medication(s):  


Apixaban


Restart Stopped Medication(s):


may resume apixaban tonight





Anesthesia


Post Anesthesia Instructions:


If you have had General Anesthesia or IV Sedation:





*  Do not drive today.





*  Resume driving when surgeon permits.





*  Do not make important decisions or sign legal documents today.





*  Call surgeon for:


   1.  Temperature elevations greater than 101 degrees F.


   2.  Uncontrollable pain.


   3.  Excessive bleeding.


   4.  Persistent nausea and vomiting.


   5.  Medication intolerance (nausea, vomiting or rash).





*  For nausea and vomiting use only clear liquids such as: tea, soda, bouillon 

until


   nausea subsides, then gradually increase diet as tolerated.





*  If you have any concerns or questions, call your surgeon's office.  If 

physician is 


   unavailable and it is an emergency, call 911 or go to the nearest emergency 

room.





Instructions


Activity Recommendations:  no limitations


Recommended Home Diet:  resume previous diet


Allergies:  


Coded Allergies:  


     No Known Allergies (Verified , 3/13/17)


Provider Instructions


Resume outpatient medications with the following modification:


Take 2 tablets of Bumex every morning and 1 tablet of Bumex every afternoon





Follow Up


Follow-up with:


Follow-up for blood test in 3-4 days (MANDA)





Diana Galindo Recommendations:


 


Call your doctor if:





*  Temperature above 101 degrees


*  Pain not relieved by pain medicine ordered


*  There is increased drainage or redness from any incision


*  You have any unanswered questions or concerns.





Your Doctors Instructions noted above were prepared by provider Christophe W Kocher.


Patient Signature Section:





 Patient Instructions Signature Page














Willie Ferguson 











Patient (or Guardian) Signature/Date:____________________________________ I 

have read and understand the instructions given to me by my caregivers.








Caregiver/RN/Doctor Signature/Date:____________________________________











The above-named patient and/or guardian has received patient instructions on 

this date.





























+  Original Patient Signature Page (only) louis with chart.  Please make copy 

for patient.

## 2017-11-28 ENCOUNTER — HOSPITAL ENCOUNTER (OUTPATIENT)
Dept: HOSPITAL 45 - C.LAB | Age: 82
Discharge: HOME | End: 2017-11-28
Attending: INTERNAL MEDICINE
Payer: COMMERCIAL

## 2017-11-28 DIAGNOSIS — I50.9: Primary | ICD-10-CM

## 2017-11-28 LAB
ANION GAP SERPL CALC-SCNC: 6 MMOL/L (ref 3–11)
BUN SERPL-MCNC: 24 MG/DL (ref 7–18)
BUN/CREAT SERPL: 18.5 (ref 10–20)
CALCIUM SERPL-MCNC: 10.9 MG/DL (ref 8.5–10.1)
CHLORIDE SERPL-SCNC: 100 MMOL/L (ref 98–107)
CO2 SERPL-SCNC: 30 MMOL/L (ref 21–32)
CREAT SERPL-MCNC: 1.3 MG/DL (ref 0.6–1.4)
GLUCOSE SERPL-MCNC: 89 MG/DL (ref 70–99)
POTASSIUM SERPL-SCNC: 4 MMOL/L (ref 3.5–5.1)
SODIUM SERPL-SCNC: 136 MMOL/L (ref 136–145)

## 2018-02-05 ENCOUNTER — HOSPITAL ENCOUNTER (OUTPATIENT)
Dept: HOSPITAL 45 - C.RADPV | Age: 83
Discharge: HOME | End: 2018-02-05
Attending: FAMILY MEDICINE
Payer: COMMERCIAL

## 2018-02-05 DIAGNOSIS — M70.72: Primary | ICD-10-CM

## 2018-02-05 DIAGNOSIS — M25.552: ICD-10-CM

## 2018-02-05 DIAGNOSIS — M16.12: ICD-10-CM

## 2018-02-05 NOTE — DIAGNOSTIC IMAGING REPORT
L HIP UNILATERAL 2 VIEWS



CLINICAL HISTORY: Left hip pain. Bursitis.     



COMPARISON: None.



DISCUSSION: No acute fractures are visualized. There are mild osteoarthritic

changes. There are paranasal labral calcifications. No destructive lesions are

evident.



IMPRESSION: 

1. No acute fractures

2. Mild osteoarthritic change







Electronically signed by:  Eduardo Duong M.D.

2/5/2018 4:37 PM



Dictated Date/Time:  2/5/2018 4:36 PM

## 2018-02-14 ENCOUNTER — HOSPITAL ENCOUNTER (INPATIENT)
Dept: HOSPITAL 45 - C.EDC | Age: 83
LOS: 10 days | Discharge: NURSING, REHAB OR LONG-TERM CARE | DRG: 291 | End: 2018-02-24
Attending: HOSPITALIST | Admitting: HOSPITALIST
Payer: COMMERCIAL

## 2018-02-14 VITALS
BODY MASS INDEX: 27.68 KG/M2 | HEIGHT: 67 IN | HEIGHT: 67 IN | WEIGHT: 176.37 LBS | BODY MASS INDEX: 27.68 KG/M2 | WEIGHT: 176.37 LBS

## 2018-02-14 VITALS
OXYGEN SATURATION: 95 % | SYSTOLIC BLOOD PRESSURE: 134 MMHG | TEMPERATURE: 99.68 F | HEART RATE: 52 BPM | DIASTOLIC BLOOD PRESSURE: 78 MMHG

## 2018-02-14 DIAGNOSIS — Z86.61: ICD-10-CM

## 2018-02-14 DIAGNOSIS — E78.5: ICD-10-CM

## 2018-02-14 DIAGNOSIS — I50.33: ICD-10-CM

## 2018-02-14 DIAGNOSIS — Z79.01: ICD-10-CM

## 2018-02-14 DIAGNOSIS — E87.1: ICD-10-CM

## 2018-02-14 DIAGNOSIS — T50.2X5A: ICD-10-CM

## 2018-02-14 DIAGNOSIS — E21.3: ICD-10-CM

## 2018-02-14 DIAGNOSIS — I31.3: ICD-10-CM

## 2018-02-14 DIAGNOSIS — L03.115: ICD-10-CM
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
## 2018-02-14 DIAGNOSIS — R79.89: ICD-10-CM

## 2018-02-14 DIAGNOSIS — M70.62: ICD-10-CM

## 2018-02-14 DIAGNOSIS — N18.3: ICD-10-CM

## 2018-02-14 DIAGNOSIS — I48.91: ICD-10-CM

## 2018-02-14 DIAGNOSIS — H35.30: ICD-10-CM

## 2018-02-14 DIAGNOSIS — M81.0: ICD-10-CM

## 2018-02-14 DIAGNOSIS — M1A.9XX0: ICD-10-CM

## 2018-02-14 DIAGNOSIS — N40.1: ICD-10-CM

## 2018-02-14 DIAGNOSIS — I48.92: ICD-10-CM

## 2018-02-14 DIAGNOSIS — K57.30: ICD-10-CM

## 2018-02-14 DIAGNOSIS — I13.0: Primary | ICD-10-CM

## 2018-02-14 DIAGNOSIS — N17.9: ICD-10-CM

## 2018-02-14 LAB
ALBUMIN SERPL-MCNC: 2.8 GM/DL (ref 3.4–5)
ALP SERPL-CCNC: 102 U/L (ref 45–117)
ALT SERPL-CCNC: 22 U/L (ref 12–78)
AST SERPL-CCNC: 32 U/L (ref 15–37)
BASOPHILS # BLD: 0.03 K/UL (ref 0–0.2)
BASOPHILS NFR BLD: 0.5 %
BUN SERPL-MCNC: 41 MG/DL (ref 7–18)
CALCIUM SERPL-MCNC: 10.5 MG/DL (ref 8.5–10.1)
CK MB SERPL-MCNC: 2.5 NG/ML (ref 0.5–3.6)
CO2 SERPL-SCNC: 26 MMOL/L (ref 21–32)
CREAT SERPL-MCNC: 1.41 MG/DL (ref 0.6–1.4)
EOS ABS #: 0.02 K/UL (ref 0–0.5)
EOSINOPHIL NFR BLD AUTO: 115 K/UL (ref 130–400)
GLUCOSE SERPL-MCNC: 97 MG/DL (ref 70–99)
HCT VFR BLD CALC: 45.3 % (ref 42–52)
HGB BLD-MCNC: 15.7 G/DL (ref 14–18)
IG#: 0.01 K/UL (ref 0–0.02)
IMM GRANULOCYTES NFR BLD AUTO: 16.3 %
INR PPP: 1.3 (ref 0.9–1.1)
LIPASE: 388 U/L (ref 73–393)
LYMPHOCYTES # BLD: 1.02 K/UL (ref 1.2–3.4)
MCH RBC QN AUTO: 31.6 PG (ref 25–34)
MCHC RBC AUTO-ENTMCNC: 34.7 G/DL (ref 32–36)
MCV RBC AUTO: 91.1 FL (ref 80–100)
MONO ABS #: 0.86 K/UL (ref 0.11–0.59)
MONOCYTES NFR BLD: 13.7 %
NEUT ABS #: 4.32 K/UL (ref 1.4–6.5)
NEUTROPHILS # BLD AUTO: 0.3 %
NEUTROPHILS NFR BLD AUTO: 69 %
PMV BLD AUTO: 10.5 FL (ref 7.4–10.4)
POTASSIUM SERPL-SCNC: 4.6 MMOL/L (ref 3.5–5.1)
PROT SERPL-MCNC: 6.1 GM/DL (ref 6.4–8.2)
PTT PATIENT: 32.4 SECONDS (ref 21–31)
RED CELL DISTRIBUTION WIDTH CV: 16.9 % (ref 11.5–14.5)
RED CELL DISTRIBUTION WIDTH SD: 55.4 FL (ref 36.4–46.3)
SODIUM SERPL-SCNC: 134 MMOL/L (ref 136–145)
WBC # BLD AUTO: 6.26 K/UL (ref 4.8–10.8)

## 2018-02-14 NOTE — HISTORY AND PHYSICAL
History & Physical


Date & Time of Service:


Feb 14, 2018 at 21:24


Chief Complaint:


Leg Pain & Edema


Primary Care Physician:


Liliana Talavera M.D.


History of Present Illness


Source:  patient, hospital records


88 y/o M Hx diastolic CHF, AF, HTN, HPL, BPH.  Presents from primary MDs office 

for progressive edema.  The pt is under the impression that he attended the 

hospital due to L hip pain which he has had for at least a few moths.  He was 

due to see an orthopedist for this in the coming week.   He states that he was 

told he is having hip pain because he "needs the water drained out of his leg". 

When questioned, he admits to persistent SOB which is more pronounced with 

exertion.  He denies CP, N/V, fevers or dysuria.


Initial exam, and imaging are consistent with significant volume overload.  An 

EKG is reveals A flutter with a controlled rate.  He may be developing 

cellulitis of his RLE as well.





Past Medical/Surgical History


1) Diastolic CHF - EF 52% 


2) Chronic AF


3) HTN


4) Lower extremity edema


5) GI bleed


6) Pseudomonal pneumonia


7) BPH





Family History





Insignificant due to advanced age 


Could not provide a family history





Social History


Previous records indicate possible excessive alcohol use - pt denies - did not 

require treatment for during previous admission


Smoking Status:  Never Smoker


Drug Use:  none


Marital Status:  


Housing status:  lives with family


Occupational Status:  retired





Immunizations


History of Influenza Vaccine:  No


History of Tetanus Vaccine?:  Unknown


History of Pneumococcal:  Yes


History of Hepatitis B Vaccine:  Unknown





Multi-Drug Resistant Organisms


History of MDRO:  No





Allergies


Coded Allergies:  


     No Known Allergies (Verified , 2/14/18)





Home Medications


Scheduled


Apixaban (Eliquis), 5 MG PO BID


Cholecalciferol (Vitamin D3), 400 INTUNIT PO HS


Finasteride (Finasteride), 5 MG PO QAM


Furosemide (Lasix), 2 TAB PO QAM


Furosemide (Lasix), 40 MG PO QPM


Glucosamine Sulfate (Glucosamine), 1,000 MG PO BID


Lisinopril (Zestril), 10 MG PO BID


Metoprolol Succinate (Metoprolol Succinate ER), 0.5 TAB PO BID


Ocuvite Preservision (Ocuvite Preservision), 1 TAB PO BID


Pantoprazole (Protonix), 40 MG PO DAILY


Potassium Ext Rel (Klor-Con), 20 MEQ PO DAILY


Senna/Docusate Sod (Senokot S), 1 TAB PO BID


Tamsulosin Hcl (Flomax), 0.4 MG PO HS





Scheduled PRN


Albuterol Sulf (Proventil 0.083% 2.5MG/3ML), 2.5 MG INH QID PRN for AS NEEDED


Tramadol (Ultram), 50 MG PO Q6H PRN for Pain





Review of Systems


Constitutional:  No fever, No chills, No sweats


Eyes:  No worsening of vision


ENT:  No hearing loss, No nasal symptoms


Respiratory:  + shortness of breath, + dyspnea on exertion, + dyspnea at rest, 

No cough, No wheezing


Cardiovascular:  No chest pain


Abdomen:  No pain, No nausea, No vomiting


Musculoskeletal:  + joint pain (L hip pain - chronic), + problem reported (BL 

worsening LE edema)


Genitourinary - Male:  No hematuria, No dysuria


Neurologic:  + weakness, No memory loss, No paralysis


Psychiatric:  No depression symptoms


Endocrine:  + fatigue


Hematologic / Lymphatic:  No abnormal bleeding/bruising


Integumentary:  + rash (erythema of the RLE)


Allergic / Immunologic:  No environmental allergies





Physical Exam


Vital Signs











  Date Time  Temp Pulse Resp B/P (MAP) Pulse Ox O2 Delivery O2 Flow Rate FiO2


 


2/14/18 21:20  56      


 


2/14/18 21:03  57 18  92   


 


2/14/18 20:33  57 19  93   


 


2/14/18 20:09    127/74    


 


2/14/18 20:03  55 25  95   


 


2/14/18 19:33  57 23  93   


 


2/14/18 19:03  62 23  92   


 


2/14/18 18:58    124/57    


 


2/14/18 18:31  54 21  95   


 


2/14/18 18:01  61 22  94   


 


2/14/18 17:31  55 24  91   


 


2/14/18 17:18     94 Room Air  


 


2/14/18 17:01  52 29     


 


2/14/18 17:00  51      


 


2/14/18 16:59 36.4 55 24 116/53 94 Room Air  


 


2/14/18 16:41    116/53    








General Appearance:  + pertinent finding (Lethargic elderly male - AAO and 

cooperative)


Head:  normocephalic


Eyes:  normal inspection


ENT:  normal ENT inspection, pharynx normal


Neck:  supple, + JVD


Respiratory/Chest:  chest non-tender, + crackles (BL at bases - minimal air 

entry at lower bases)


Cardiovascular:  + JVD, + systolic murmur, + irregularly irregular


Abdomen/GI:  normal bowel sounds, non tender, soft


Back:  normal inspection, no CVA tenderness


Extremities/Musculoskelatal:  + pedal edema (There is massive BL edema which 

extends into the hips)


Neurologic/Psych:  CNs II-XII nml as tested, no motor/sensory deficits, alert, 

normal mood/affect


Skin:  + pertinent finding (Erythema and warmth of the RLE)





Diagnostics


Laboratory Results





Results Past 24 Hours








Test


  2/14/18


17:15 2/14/18


17:28 Range/Units


 


 


Urine Color YELLOW   


 


Urine Appearance CLOUDY  CLEAR  


 


Urine pH 5.5  4.5-7.5  


 


Urine Specific Gravity 1.009  1.000-1.030  


 


Urine Protein NEG  NEG  


 


Urine Glucose (UA) NEG  NEG  


 


Urine Ketones NEG  NEG  


 


Urine Occult Blood NEG  NEG  


 


Urine Nitrite NEG  NEG  


 


Urine Bilirubin NEG  NEG  


 


Urine Urobilinogen NEG  NEG  


 


Urine Leukocyte Esterase TRACE  NEG  


 


Urine WBC (Auto) 1-5  0-5  /hpf


 


Urine RBC (Auto) 0-4  0-4  /hpf


 


Urine Hyaline Casts (Auto) 1-5  0-5  /lpf


 


Urine Epithelial Cells (Auto) 5-10  0-5  /lpf


 


Urine Bacteria (Auto) NEG  NEG  


 


White Blood Count  6.26 4.8-10.8  K/uL


 


Red Blood Count  4.97 4.7-6.1  M/uL


 


Hemoglobin  15.7 14.0-18.0  g/dL


 


Hematocrit  45.3 42-52  %


 


Mean Corpuscular Volume  91.1   fL


 


Mean Corpuscular Hemoglobin  31.6 25-34  pg


 


Mean Corpuscular Hemoglobin


Concent 


  34.7


  32-36  g/dl


 


 


Platelet Count  115 130-400  K/uL


 


Mean Platelet Volume  10.5 7.4-10.4  fL


 


Neutrophils (%) (Auto)  69.0  %


 


Lymphocytes (%) (Auto)  16.3  %


 


Monocytes (%) (Auto)  13.7  %


 


Eosinophils (%) (Auto)  0.3  %


 


Basophils (%) (Auto)  0.5  %


 


Neutrophils # (Auto)  4.32 1.4-6.5  K/uL


 


Lymphocytes # (Auto)  1.02 1.2-3.4  K/uL


 


Monocytes # (Auto)  0.86 0.11-0.59  K/uL


 


Eosinophils # (Auto)  0.02 0-0.5  K/uL


 


Basophils # (Auto)  0.03 0-0.2  K/uL


 


RDW Standard Deviation  55.4 36.4-46.3  fL


 


RDW Coefficient of Variation  16.9 11.5-14.5  %


 


Immature Granulocyte % (Auto)  0.2  %


 


Immature Granulocyte # (Auto)  0.01 0.00-0.02  K/uL


 


Erythrocyte Sedimentation Rate  5 0-14  mm/hr


 


Prothrombin Time


  


  13.2


  9.0-12.0


SECONDS


 


Prothromb Time International


Ratio 


  1.3


  0.9-1.1  


 


 


Activated Partial


Thromboplast Time 


  32.4


  21.0-31.0


SECONDS


 


Partial Thromboplastin Ratio  1.2  


 


Sodium Level  134 136-145  mmol/L


 


Potassium Level  4.6 3.5-5.1  mmol/L


 


Chloride Level  99   mmol/L


 


Carbon Dioxide Level  26 21-32  mmol/L


 


Anion Gap  9.0 3-11  mmol/L


 


Blood Urea Nitrogen  41 7-18  mg/dl


 


Creatinine


  


  1.41


  0.60-1.40


mg/dl


 


Est Creatinine Clear Calc


Drug Dose 


  38.9


   ml/min


 


 


Estimated GFR (


American) 


  51.5


   


 


 


Estimated GFR (Non-


American 


  44.5


   


 


 


BUN/Creatinine Ratio  29.0 10-20  


 


Random Glucose  97 70-99  mg/dl


 


Calcium Level  10.5 8.5-10.1  mg/dl


 


Total Bilirubin  1.7 0.2-1  mg/dl


 


Direct Bilirubin  0.8 0-0.2  mg/dl


 


Aspartate Amino Transf


(AST/SGOT) 


  32


  15-37  U/L


 


 


Alanine Aminotransferase


(ALT/SGPT) 


  22


  12-78  U/L


 


 


Alkaline Phosphatase  102   U/L


 


Total Creatine Kinase  61   U/L


 


Creatine Kinase MB  2.5 0.5-3.6  ng/ml


 


Creatine Kinase MB Ratio  4.1 0-3.0  


 


Troponin I  0.157 0-0.045  ng/ml


 


Total Protein  6.1 6.4-8.2  gm/dl


 


Albumin  2.8 3.4-5.0  gm/dl


 


Lipase  388   U/L











Diagnostic Radiology


CXR:


Interval large left pleural effusion. Smaller right pleural effusion. Cardiac 

enlargement. Congestive heart failure.





EKG


 flutter - controlled rate - previous EKGs consistent with A fib





Impression


Assessment and Plan


88 y/o M Hx diastolic CHF, AF, HTN, HPL, BPH.  Presents from primary MDs office 

for progressive edema.  The pt is under the impression that he attended the 

hospital due to L hip pain which he has had for at least a few moths.  He was 

due to see an orthopedist for this in the coming week.   He states that he was 

told he is having hip pain because he "needs the water drained out of his leg". 

When questioned, he admits to persistent SOB which is more pronounced with 

exertion.  He denies CP, N/V, fevers or dysuria.


Initial exam, and imaging are consistent with significant volume overload.  An 

EKG is reveals A flutter with a controlled rate.  He may be developing 

cellulitis of his RLE as well.  





1) CHF - progressive edema and BL effusions.  He is on a high dose of Lasix - 

we will place him on IV Bumex and consult cardiology as the Lasix has not bee 

effective.  He will remain on Metoprolol.  Effusions may need drainage if there 

is no improvemrnt with diuresis - we will repeat a CXR AM





2) A flutter - rate is controlled with a B blocker - we will continue Apixaban 





3) C/O progressive hip pain - an XR was obtained 02/05 which did not reveal any 

acute findings.  We will request PT and OT AM.  If he is unable to safely 

ambulate, ortho can be consulted - he does have an appt with them next week 

otherwise.  We will obtain LE ultrasounds due to pain and degree of edema





4) Early cellulitis of RLE - placed on Ceftriaxone 





5) HTN - cont Lisinopril, Metoprolol





6) Mild DISHA on initial labs - would expect this will improve with diuresis








Full code - Apixaban prophylaxis


Total time for this admit including review of labs, meds, imaging, EKG, records 

- discussion with pt and ER attending - 40 min





Level of Care


Telemetry





Resuscitation Status


FULL RESUSCITATION





VTE Prophylaxis


Given or contraindicated:  Other Anticoagulation

## 2018-02-14 NOTE — EMERGENCY ROOM VISIT NOTE
History


Report prepared by Gely:  Alba Spear


Under the Supervision of:  Dr. Keven Kaba D.O.


First contact with patient:  16:46


Chief Complaint:  LEG PAIN,LEG INJURY


Stated Complaint:  LEG PAIN & EDEMA





History of Present Illness


The patient is an 87 year old male who presents to the Emergency Room with 

complaints of persistent left hip pain starting PTA. The patient went to see 

his PCP and had an X-ray which found that he had too much fluid in the joint. 

He was sent to the ED to have the fluid removed. He has been taking Tylenol to 

no significant relief. The patient reports leg weakness. He denies any chest 

pain or SOB. He has a history of CHF.





   Source of History:  patient


   Onset:  PTA


   Position:  other (left hip)


   Quality:  other (pain)


   Timing:  other (persistent)


   Associated Symptoms:  + weakness, No chest pain, No SOB





Review of Systems


See HPI for pertinent positives & negatives. A total of 10 systems reviewed and 

were otherwise negative.





Past Medical & Surgical


Medical Problems:


(1) Atrial fibrillation


(2) Cellulitis of right lower extremity


(3) CHF exacerbation


(4) Community acquired pneumonia


(5) Congestive Heart Failure Nos


(6) Diverticulosis Colon (W/O Ment Of Hemorrhage)


(7) GI bleed


(8) Hyperlipidemia Nec/Nos


(9) Hypertension Nos


(10) Peritonitis Nos


(11) Pseudomonal Pneumonia


(12) Sepsis


(13) Shortness of breath


(14) Viral Encephalitis Nos








Family History





Insignificant due to advanced age 





Social History


Smoking Status:  Never Smoker


Alcohol Use:  none


Drug Use:  none


Marital Status:  


Housing Status:  lives with significant other


Occupation Status:  retired





Current/Historical Medications


Scheduled


Apixaban (Eliquis), 5 MG PO BID


Cholecalciferol (Vitamin D3), 400 INTUNIT PO HS


Finasteride (Finasteride), 5 MG PO QAM


Furosemide (Lasix), 2 TAB PO QAM


Furosemide (Lasix), 40 MG PO QPM


Glucosamine Sulfate (Glucosamine), 1,000 MG PO BID


Lisinopril (Zestril), 10 MG PO BID


Metoprolol Succinate (Metoprolol Succinate ER), 0.5 TAB PO BID


Ocuvite Preservision (Ocuvite Preservision), 1 TAB PO BID


Pantoprazole (Protonix), 40 MG PO DAILY


Potassium Ext Rel (Klor-Con), 20 MEQ PO DAILY


Senna/Docusate Sod (Senokot S), 1 TAB PO BID


Tamsulosin Hcl (Flomax), 0.4 MG PO HS





Scheduled PRN


Albuterol Sulf (Proventil 0.083% 2.5MG/3ML), 2.5 MG INH QID PRN for AS NEEDED


Tramadol (Ultram), 50 MG PO Q6H PRN for Pain





Allergies


Coded Allergies:  


     No Known Allergies (Verified , 2/14/18)





Physical Exam


Vital Signs











  Date Time  Temp Pulse Resp B/P (MAP) Pulse Ox O2 Delivery O2 Flow Rate FiO2


 


2/14/18 21:43    127/67    


 


2/14/18 21:38  50 23  93   


 


2/14/18 21:20  56      


 


2/14/18 21:08  56 23  94   


 


2/14/18 21:03  57 18  92   


 


2/14/18 20:33  57 19  93   


 


2/14/18 20:09    127/74    


 


2/14/18 20:03  55 25  95   


 


2/14/18 19:33  57 23  93   


 


2/14/18 19:03  62 23  92   


 


2/14/18 18:58    124/57    


 


2/14/18 18:31  54 21  95   


 


2/14/18 18:01  61 22  94   


 


2/14/18 17:31  55 24  91   


 


2/14/18 17:18     94 Room Air  


 


2/14/18 17:01  52 29     


 


2/14/18 17:00  51      


 


2/14/18 16:59 36.4 55 24 116/53 94 Room Air  


 


2/14/18 16:41    116/53    











Physical Exam


CONSTITUTIONAL/VITAL SIGNS: Reviewed / noted above.


GENERAL: Non-toxic in appearance. 


INTEGUMENTARY: Warm, dry, and Pink.


HEAD: Normocephalic.


EYES: without scleral icterus or trauma.


ENT/OROPHARYNX: clear and moist.


LYMPHADENOPATHY/NECK: Is supple without lymphadenopathy or meningismus.


RESPIRATORY: Lungs clear and equal.


CARDIOVASCULAR: Regular rate and rhythm.


GI/ABDOMEN: Soft and nontender. No organomegaly or pulsatile mass. No rebound 

or guarding. Normal bowel sounds.


EXTREMITIES: Bilateral pitting edema. Mild erythema to the RLE. 


BACK: No CVA tenderness.


NEUROLOGICAL: Intact without focal deficits. 


PSYCHIATRIC: normal affect.


MUSCULOSKELETAL: Normally developed with good muscle tone.





Medical Decision & Procedures


ER Provider


Diagnostic Interpretation:


X ray results and stated below per my interpretation and radiology 

interpretation.





CHEST ONE VIEW PORTABLE





CLINICAL HISTORY:  Evaluate Fever/Sepsis dyspnea





COMPARISON STUDY:  10/2/2017





FINDINGS: Interval large left pleural effusion. Smaller right pleural effusion.


Cardiac enlargement. Congestive heart failure. 





IMPRESSION:  Congestive heart failure. Large left as well as a small right


pleural effusion.





The above report was generated using voice recognition software.  It may contain


grammatical, syntax or spelling errors.





Electronically signed by:  Luis Fenton M.D.


2/14/2018 6:44 PM





Dictated Date/Time:  2/14/2018 6:44 PM





Laboratory Results


2/14/18 17:28








Red Blood Count 4.97, Mean Corpuscular Volume 91.1, Mean Corpuscular Hemoglobin 

31.6, Mean Corpuscular Hemoglobin Concent 34.7, Mean Platelet Volume 10.5, 

Neutrophils (%) (Auto) 69.0, Lymphocytes (%) (Auto) 16.3, Monocytes (%) (Auto) 

13.7, Eosinophils (%) (Auto) 0.3, Basophils (%) (Auto) 0.5, Neutrophils # (Auto

) 4.32, Lymphocytes # (Auto) 1.02, Monocytes # (Auto) 0.86, Eosinophils # (Auto

) 0.02, Basophils # (Auto) 0.03





2/14/18 17:28

















Test


  2/14/18


17:15 2/14/18


17:28


 


Urine Color YELLOW  


 


Urine Appearance CLOUDY (CLEAR)  


 


Urine pH 5.5 (4.5-7.5)  


 


Urine Specific Gravity


  1.009


(1.000-1.030) 


 


 


Urine Protein NEG (NEG)  


 


Urine Glucose (UA) NEG (NEG)  


 


Urine Ketones NEG (NEG)  


 


Urine Occult Blood NEG (NEG)  


 


Urine Nitrite NEG (NEG)  


 


Urine Bilirubin NEG (NEG)  


 


Urine Urobilinogen NEG (NEG)  


 


Urine Leukocyte Esterase TRACE (NEG)  


 


Urine WBC (Auto) 1-5 /hpf (0-5)  


 


Urine RBC (Auto) 0-4 /hpf (0-4)  


 


Urine Hyaline Casts (Auto) 1-5 /lpf (0-5)  


 


Urine Epithelial Cells (Auto)


  5-10 /lpf


(0-5) 


 


 


Urine Bacteria (Auto) NEG (NEG)  


 


White Blood Count


  


  6.26 K/uL


(4.8-10.8)


 


Red Blood Count


  


  4.97 M/uL


(4.7-6.1)


 


Hemoglobin


  


  15.7 g/dL


(14.0-18.0)


 


Hematocrit  45.3 % (42-52) 


 


Mean Corpuscular Volume


  


  91.1 fL


()


 


Mean Corpuscular Hemoglobin


  


  31.6 pg


(25-34)


 


Mean Corpuscular Hemoglobin


Concent 


  34.7 g/dl


(32-36)


 


Platelet Count


  


  115 K/uL


(130-400)


 


Mean Platelet Volume


  


  10.5 fL


(7.4-10.4)


 


Neutrophils (%) (Auto)  69.0 % 


 


Lymphocytes (%) (Auto)  16.3 % 


 


Monocytes (%) (Auto)  13.7 % 


 


Eosinophils (%) (Auto)  0.3 % 


 


Basophils (%) (Auto)  0.5 % 


 


Neutrophils # (Auto)


  


  4.32 K/uL


(1.4-6.5)


 


Lymphocytes # (Auto)


  


  1.02 K/uL


(1.2-3.4)


 


Monocytes # (Auto)


  


  0.86 K/uL


(0.11-0.59)


 


Eosinophils # (Auto)


  


  0.02 K/uL


(0-0.5)


 


Basophils # (Auto)


  


  0.03 K/uL


(0-0.2)


 


RDW Standard Deviation


  


  55.4 fL


(36.4-46.3)


 


RDW Coefficient of Variation


  


  16.9 %


(11.5-14.5)


 


Immature Granulocyte % (Auto)  0.2 % 


 


Immature Granulocyte # (Auto)


  


  0.01 K/uL


(0.00-0.02)


 


Erythrocyte Sedimentation Rate  5 mm/hr (0-14) 


 


Prothrombin Time


  


  13.2 SECONDS


(9.0-12.0)


 


Prothromb Time International


Ratio 


  1.3 (0.9-1.1) 


 


 


Activated Partial


Thromboplast Time 


  32.4 SECONDS


(21.0-31.0)


 


Partial Thromboplastin Ratio  1.2 


 


Anion Gap


  


  9.0 mmol/L


(3-11)


 


Est Creatinine Clear Calc


Drug Dose 


  38.9 ml/min 


 


 


Estimated GFR (


American) 


  51.5 


 


 


Estimated GFR (Non-


American 


  44.5 


 


 


BUN/Creatinine Ratio  29.0 (10-20) 


 


Calcium Level


  


  10.5 mg/dl


(8.5-10.1)


 


Total Bilirubin


  


  1.7 mg/dl


(0.2-1)


 


Direct Bilirubin


  


  0.8 mg/dl


(0-0.2)


 


Aspartate Amino Transf


(AST/SGOT) 


  32 U/L (15-37) 


 


 


Alanine Aminotransferase


(ALT/SGPT) 


  22 U/L (12-78) 


 


 


Alkaline Phosphatase


  


  102 U/L


()


 


Total Creatine Kinase


  


  61 U/L


()


 


Creatine Kinase MB


  


  2.5 ng/ml


(0.5-3.6)


 


Creatine Kinase MB Ratio  4.1 (0-3.0) 


 


Troponin I


  


  0.157 ng/ml


(0-0.045)


 


Total Protein


  


  6.1 gm/dl


(6.4-8.2)


 


Albumin


  


  2.8 gm/dl


(3.4-5.0)


 


Lipase


  


  388 U/L


()





Laboratory results as stated above per my review.





Medications Administered











 Medications


  (Trade)  Dose


 Ordered  Sig/Rudy


 Route  Start Time


 Stop Time Status Last Admin


Dose Admin


 


 Furosemide


  (Lasix Inj)  40 mg  NOW  STAT


 IV  2/14/18 19:02


 2/14/18 19:03 DC 2/14/18 20:04


40 MG


 


 Cefazolin Sodium


  (Cefazolin


 1000mg Iv Push)  1,000 mg  STK-MED ONCE


 IV  2/14/18 19:58


 2/14/18 19:59 DC 2/14/18 20:02


1,000 MG











ECG Per My Interpretation


Indication:  weakness


Rate (beats per minute):  57


Rhythm:  atrial flutter


Findings:  PVC, other (no ST elevation)


Comparison ECG Date:  14-Mar-2017


Change:


Atrial flutter has replaced atrial fibrillation.





ED Course


1647: Previous medical records were reviewed. The patient was evaluated in room 

C8. A complete history and physical examination was performed.





1730: Ancef Inj 1000 mg IV.





1902: Lasix Inj 40 mg IV. 





1933: I discussed the patient's case with Dr. Rush, [facility] hospitalist. The 

patient will be evaluated for further treatment and disposition.





Medical Decision


Differential includes acute coronary syndrome, myocardial infarction, CVA, TIA, 

anemia, infection, pneumonia, UTI, pyelonephritis, poor nutrition, dehydration, 

electrolyte disturbance,hypoglycemia.





This is an 87-year-old male who presents to the ED with a chief complaint of 

worsening lower extremity edema.  The patient was seen by his PCP earlier today 

and sent here for further evaluation.  The patient also appears to have some 

mild redness to the right lower extremity that is new.  He has reported some 

mild shortness of breath with exertion.  He also reports weakness.  He had some 

left hip pain earlier today and had an x-ray that did not show any acute 

process.  His vital signs are stable.  His physical exam reveals bilateral 

pitting edema.  He also has a mild redness of the right lower extremity.  CBC 

is normal, sed rate was 5.  INR is 1.3.  He is on Eliquis.  Troponin is 

elevated at 0.157.  BUN is 41 and creatinine is 1.4.  Urine did not show 

infection.  Chest x-ray reveals findings concerning for congestive heart 

failure as well as a large left and a small right pleural effusion.  The 

patient was given IV Lasix.  He was given IV Ancef.  The patient will be seen 

by the hospitalist for further inpatient evaluation and care.





Medication Reconcilliation


Current Medication List:  was personally reviewed by me





Blood Pressure Screening


Patient's blood pressure:  Normal blood pressure


Blood pressure disposition:  Did not require urgent referral





Consults


Time Called:  1902


Consulting Physician:  Dr. Rush [facility] hospitalist


Returned Call:  [phone]


Discussed the patient's case. The patient will be evaluated for further 

treatment and disposition.





Impression





 Primary Impression:  


 CHF exacerbation


 Additional Impressions:  


 Cellulitis of right lower extremity


 Elevated troponin





Scribe Attestation


The scribe's documentation has been prepared under my direction and personally 

reviewed by me in its entirety. I confirm that the note above accurately 

reflects all work, treatment, procedures, and medical decision making performed 

by me.





Departure Information


Dispostion


Being Evaluated By Hospitalist





Referrals


Liliana Talavera M.D. (PCP)





Patient Instructions


My [facility]





Problem Qualifiers

## 2018-02-14 NOTE — DIAGNOSTIC IMAGING REPORT
CHEST ONE VIEW PORTABLE



CLINICAL HISTORY:  Evaluate Fever/Sepsis dyspnea



COMPARISON STUDY:  10/2/2017



FINDINGS: Interval large left pleural effusion. Smaller right pleural effusion.

Cardiac enlargement. Congestive heart failure. 



IMPRESSION:  Congestive heart failure. Large left as well as a small right

pleural effusion.











The above report was generated using voice recognition software.  It may contain

grammatical, syntax or spelling errors.









Electronically signed by:  Luis Fenton M.D.

2/14/2018 6:44 PM



Dictated Date/Time:  2/14/2018 6:44 PM

## 2018-02-15 VITALS
OXYGEN SATURATION: 95 % | DIASTOLIC BLOOD PRESSURE: 62 MMHG | SYSTOLIC BLOOD PRESSURE: 100 MMHG | TEMPERATURE: 97.88 F | HEART RATE: 74 BPM

## 2018-02-15 VITALS — SYSTOLIC BLOOD PRESSURE: 107 MMHG | DIASTOLIC BLOOD PRESSURE: 61 MMHG | OXYGEN SATURATION: 96 % | TEMPERATURE: 98.24 F

## 2018-02-15 VITALS
HEART RATE: 45 BPM | OXYGEN SATURATION: 98 % | SYSTOLIC BLOOD PRESSURE: 98 MMHG | TEMPERATURE: 97.16 F | DIASTOLIC BLOOD PRESSURE: 54 MMHG

## 2018-02-15 VITALS
HEART RATE: 58 BPM | DIASTOLIC BLOOD PRESSURE: 56 MMHG | SYSTOLIC BLOOD PRESSURE: 103 MMHG | OXYGEN SATURATION: 96 % | TEMPERATURE: 97.88 F

## 2018-02-15 VITALS
DIASTOLIC BLOOD PRESSURE: 61 MMHG | SYSTOLIC BLOOD PRESSURE: 117 MMHG | TEMPERATURE: 97.34 F | HEART RATE: 52 BPM | OXYGEN SATURATION: 97 %

## 2018-02-15 VITALS
DIASTOLIC BLOOD PRESSURE: 67 MMHG | OXYGEN SATURATION: 97 % | SYSTOLIC BLOOD PRESSURE: 110 MMHG | HEART RATE: 58 BPM | TEMPERATURE: 97.34 F

## 2018-02-15 VITALS — DIASTOLIC BLOOD PRESSURE: 70 MMHG | SYSTOLIC BLOOD PRESSURE: 105 MMHG

## 2018-02-15 VITALS — OXYGEN SATURATION: 98 %

## 2018-02-15 LAB
BUN SERPL-MCNC: 38 MG/DL (ref 7–18)
CALCIUM SERPL-MCNC: 10.8 MG/DL (ref 8.5–10.1)
CO2 SERPL-SCNC: 30 MMOL/L (ref 21–32)
CREAT SERPL-MCNC: 1.48 MG/DL (ref 0.6–1.4)
GLUCOSE SERPL-MCNC: 146 MG/DL (ref 70–99)
POTASSIUM SERPL-SCNC: 4.1 MMOL/L (ref 3.5–5.1)
SODIUM SERPL-SCNC: 134 MMOL/L (ref 136–145)

## 2018-02-15 RX ADMIN — BUMETANIDE SCH MLS/MIN: 0.25 INJECTION INTRAMUSCULAR; INTRAVENOUS at 21:09

## 2018-02-15 RX ADMIN — PANTOPRAZOLE SCH MG: 40 TABLET, DELAYED RELEASE ORAL at 07:50

## 2018-02-15 RX ADMIN — APIXABAN SCH MG: 2.5 TABLET, FILM COATED ORAL at 07:50

## 2018-02-15 RX ADMIN — BUMETANIDE SCH MLS/MIN: 0.25 INJECTION INTRAMUSCULAR; INTRAVENOUS at 07:47

## 2018-02-15 RX ADMIN — BUMETANIDE SCH MLS/MIN: 0.25 INJECTION INTRAMUSCULAR; INTRAVENOUS at 14:27

## 2018-02-15 RX ADMIN — TAMSULOSIN HYDROCHLORIDE SCH MG: 0.4 CAPSULE ORAL at 21:10

## 2018-02-15 RX ADMIN — BUMETANIDE SCH MLS/MIN: 0.25 INJECTION INTRAMUSCULAR; INTRAVENOUS at 17:48

## 2018-02-15 RX ADMIN — POTASSIUM CHLORIDE SCH MEQ: 1500 TABLET, EXTENDED RELEASE ORAL at 07:52

## 2018-02-15 RX ADMIN — STANDARDIZED SENNA CONCENTRATE AND DOCUSATE SODIUM SCH TAB: 8.6; 5 TABLET ORAL at 07:50

## 2018-02-15 RX ADMIN — CEFTRIAXONE SODIUM SCH MLS/HR: 1 INJECTION, POWDER, FOR SOLUTION INTRAVENOUS at 14:52

## 2018-02-15 RX ADMIN — APIXABAN SCH MG: 2.5 TABLET, FILM COATED ORAL at 21:10

## 2018-02-15 RX ADMIN — STANDARDIZED SENNA CONCENTRATE AND DOCUSATE SODIUM SCH TAB: 8.6; 5 TABLET ORAL at 21:11

## 2018-02-15 RX ADMIN — FINASTERIDE SCH MG: 5 TABLET, FILM COATED ORAL at 07:51

## 2018-02-15 NOTE — CLINICAL DOCUMENTATION QUERY
**** CLINICAL DOCUMENTATION QUERY****



QUERY 1 OF 2



86 yo male evaluated and admitted for progressive edema, SOB and volume overload.



In your clinical opinion is this patient being managed for:

    

                        (  ) Acute on chronic diastolic (congestive) heart failure

                        (  ) Not Agree



                        (  ) Other explanation of clinical findings (Please Explain)

                        (  ) Unable to determine (Please Define)

                        (  ) Need to Discuss

                        



The medical record reflects the following clinical findings, treatment, and risk factors.  
  



Clinical Indicators: CXR = large left pleural effusion, small right pleural effusion, 
cardiac enlargement, CHF; EKG = Afib/flutter, lower extremity edema, SOB, GARCIA; echo 3/2017 
= moderate concentric left ventricular hypertrophy, EF = 50-55%

Treatment: IV Bumex, home Lasix, Cardiology consult

Risk Factors: Hx chronic diastolic CHF, HTN, A-fib

___________________________________________________________________________________



QUERY 2 OF 2



In your clinical opinion is this patient being managed for:

    

                        (  ) Chronic kidney disease, stage 3 

                        (  ) Not Agree



                        (  ) Other explanation of clinical findings (Please Explain)

                        (  ) Unable to determine (Please Define)

                        (  ) Need to Discuss

                        



The medical record reflects the following clinical findings, treatment, and risk factors.  
  



Clinical Indicators: GFR range from October 2017 54.4 to 43.3

Treatment: Serial PRPs

Risk Factors: DISHA, CHF, HTN



Please clarify and document your clinical opinion in the progress notes and discharge 
summary. Terms such as "probable", "suspected", "likely", "questionable", "possible", or 
"still to be ruled out" are acceptable. 



*****IF IN AGREEMENT, YOU MUST DOCUMENT ABOVE DIAGNOSTIC STATEMENT IN DAILY PROGRESS NOTES 
AND DISCHARGE SUMMARY. This document is not part of the patient's record.*****

Thank You, Jacquelin Mckeon [phone]

## 2018-02-15 NOTE — HOSPITALIST PROGRESS NOTE
Hospitalist Progress Note


Date of Service


Feb 15, 2018.


 (Norma Garcia PA-C)





Subjective


Pt evaluation today including:  conversation w/ patient, physical exam, chart 

review, lab review, review of studies, review of inpatient medication list


Patient seen and evaluated. Telemetry with rate controlled A Flutter.


Patient reporting slight improvement since arriving. Mostly complaining of L 

hip pain. States the swelling in his L leg extends up to his thigh a lot 

causing his pain.


He does have 2-3+ pitting edema to the RLE extending to knee and 2-3+ pitting 

edema of LLE extending to mid-thigh


Currently on O2 and states he does not normally use O2.





   Constitutional:  No fever, No chills


   ENT:  No nasal symptoms, No sore throat


   Respiratory:  + cough, + dyspnea on exertion, No dyspnea at rest


   Cardiovascular:  No chest pain


   Abdomen:  No pain, No nausea, No vomiting, No diarrhea, No constipation


   Musculoskeletal:  + joint pain (L hip pain), + swelling, No calf pain


   Male :  No dysuria


   Heme:  No abnormal bleeding/bruising


   Skin:  No rash (Norma Garcia, PA-C)





Medications





Current Inpatient Medications








 Medications


  (Trade)  Dose


 Ordered  Sig/Rudy


 Route  Start Time


 Stop Time Status Last Admin


Dose Admin


 


 Finasteride


  (Proscar Tab)  5 mg  QAM


 PO  2/15/18 09:00


 3/17/18 08:59  2/15/18 07:51


5 MG


 


 Lisinopril


  (Zestril Tab)  10 mg  BID


 PO  2/15/18 09:00


 3/17/18 08:59  2/15/18 07:51


10 MG


 


 Metoprolol


 Succinate


  (Toprol Xl Tab)  12.5 mg  BID


 PO  2/15/18 09:00


 3/17/18 08:59   


 


 


 Pantoprazole


 Sodium


  (Protonix Tab)  40 mg  DAILY


 PO  2/15/18 09:00


 3/17/18 08:59  2/15/18 07:50


40 MG


 


 Potassium Chloride


  (Klor-Con Tab)  20 meq  DAILY


 PO  2/15/18 09:00


 3/17/18 08:59  2/15/18 07:52


20 MEQ


 


 Senna/Docusate


 Sodium


  (Senokot S Tab)  1 tab  BID


 PO  2/15/18 09:00


 3/17/18 08:59  2/15/18 07:50


1 TAB


 


 Tamsulosin HCl


  (Flomax Cap)  0.4 mg  HS


 PO  2/15/18 21:00


 3/17/18 20:59   


 


 


 Tramadol HCl


  (Ultram Tab)  50 mg  Q6H  PRN


 PO  2/14/18 21:30


 3/16/18 21:29   


 


 


 Apixaban


  (Eliquis Tab)  5 mg  BID


 PO  2/15/18 09:00


 3/17/18 08:59  2/15/18 07:50


5 MG


 


 Albuterol Sulfate


  (Ventolin 0.083%


 2.5MG/3ML Neb)  2.5 mg  Q6R  PRN


 INH  2/14/18 21:30


 3/16/18 21:29   


 


 


 Acetaminophen


  (Tylenol Tab)  650 mg  Q4H  PRN


 PO  2/14/18 22:00


 3/16/18 21:59   


 


 


 Al Hydrox/Mg


 Hydrox/Simethicone


  (Maalox Max Susp)  15 ml  Q4H  PRN


 PO  2/14/18 22:00


 3/16/18 21:59   


 


 


 Magnesium


 Hydroxide


  (Milk Of


 Magnesia Susp)  30 ml  Q12H  PRN


 PO  2/14/18 22:00


 3/16/18 21:59   


 


 


 Ondansetron HCl


  (Zofran Inj)  4 mg  Q6H  PRN


 IV  2/14/18 22:00


 3/16/18 21:59   


 


 


 Morphine Sulfate


  (MoRPHine


 SULFATE INJ)  2 mg  Q30M  PRN


 IV  2/14/18 22:00


 2/28/18 21:59   


 


 


 Polyethylene


  (Miralax Powder


 Packet)  17 gm  DAILY  PRN


 PO  2/14/18 22:00


 3/16/18 21:59   


 


 


 Bumetanide 2 mg/


 Syringe  8 ml @ 4


 mls/min  QID


 IV  2/15/18 09:00


 3/17/18 08:59  2/15/18 07:47


4 MLS/MIN








 (Norma Garcia, PA-C)





Objective


Vital Signs











  Date Time  Temp Pulse Resp B/P (MAP) Pulse Ox O2 Delivery O2 Flow Rate FiO2


 


2/15/18 08:00     98 Nasal Cannula 2.0 


 


2/15/18 07:09 36.3 58 18 110/67 (81) 97  2.0 


 


2/15/18 03:39 36.3 52 20 117/61 (79) 97 Nasal Cannula 2.0 


 


2/15/18 03:35      Nasal Cannula 2.0 


 


2/15/18 00:26      Nasal Cannula 2.0 


 


2/14/18 22:53 37.6 52 22 134/78 95 Nasal Cannula 2.0 


 


2/14/18 22:38  63 26  92   


 


2/14/18 22:17    133/73    


 


2/14/18 22:08  58 20     


 


2/14/18 21:43    127/67    


 


2/14/18 21:38  50 23  93   


 


2/14/18 21:20  56      


 


2/14/18 21:08  56 23  94   


 


2/14/18 21:03  57 18  92   


 


2/14/18 20:33  57 19  93   


 


2/14/18 20:09    127/74    


 


2/14/18 20:03  55 25  95   


 


2/14/18 19:33  57 23  93   


 


2/14/18 19:03  62 23  92   


 


2/14/18 18:58    124/57    


 


2/14/18 18:31  54 21  95   


 


2/14/18 18:01  61 22  94   


 


2/14/18 17:31  55 24  91   


 


2/14/18 17:18     94 Room Air  


 


2/14/18 17:01  52 29     


 


2/14/18 17:00  51      


 


2/14/18 16:59 36.4 55 24 116/53 94 Room Air  


 


2/14/18 16:41    116/53    








 (Norma Garcia PA-C)





Physical Exam


General Appearance:  no apparent distress


Neck:  supple, + JVD


Respiratory/Chest:  no respiratory distress, no accessory muscle use, + 

decreased breath sounds (L base to mid-lung)


Cardiovascular:  + irregularly irregular


Abdomen:  normal bowel sounds, non tender, soft


Extremities:  + pertinent finding (2-3 + pitting edema from ankle to thigh of 

LLE; 2-3+ pitting edema to knee of RLE with diffuse erythema with superficial 

abrasion to anterior portion of shin)


Neurologic/Psychiatric:  alert


Skin:  warm/dry


 (Norma Garcia PA-C)





Laboratory Results





Last 24 Hours








Test


  2/14/18


17:15 2/14/18


17:28 2/15/18


09:04


 


Urine Color YELLOW   


 


Urine Appearance CLOUDY   


 


Urine pH 5.5   


 


Urine Specific Gravity 1.009   


 


Urine Protein NEG   


 


Urine Glucose (UA) NEG   


 


Urine Ketones NEG   


 


Urine Occult Blood NEG   


 


Urine Nitrite NEG   


 


Urine Bilirubin NEG   


 


Urine Urobilinogen NEG   


 


Urine Leukocyte Esterase TRACE   


 


Urine WBC (Auto) 1-5 /hpf   


 


Urine RBC (Auto) 0-4 /hpf   


 


Urine Hyaline Casts (Auto) 1-5 /lpf   


 


Urine Epithelial Cells (Auto) 5-10 /lpf   


 


Urine Bacteria (Auto) NEG   


 


White Blood Count  6.26 K/uL  


 


Red Blood Count  4.97 M/uL  


 


Hemoglobin  15.7 g/dL  


 


Hematocrit  45.3 %  


 


Mean Corpuscular Volume  91.1 fL  


 


Mean Corpuscular Hemoglobin  31.6 pg  


 


Mean Corpuscular Hemoglobin


Concent 


  34.7 g/dl 


  


 


 


Platelet Count  115 K/uL  


 


Mean Platelet Volume  10.5 fL  


 


Neutrophils (%) (Auto)  69.0 %  


 


Lymphocytes (%) (Auto)  16.3 %  


 


Monocytes (%) (Auto)  13.7 %  


 


Eosinophils (%) (Auto)  0.3 %  


 


Basophils (%) (Auto)  0.5 %  


 


Neutrophils # (Auto)  4.32 K/uL  


 


Lymphocytes # (Auto)  1.02 K/uL  


 


Monocytes # (Auto)  0.86 K/uL  


 


Eosinophils # (Auto)  0.02 K/uL  


 


Basophils # (Auto)  0.03 K/uL  


 


RDW Standard Deviation  55.4 fL  


 


RDW Coefficient of Variation  16.9 %  


 


Immature Granulocyte % (Auto)  0.2 %  


 


Immature Granulocyte # (Auto)  0.01 K/uL  


 


Erythrocyte Sedimentation Rate  5 mm/hr  


 


Prothrombin Time  13.2 SECONDS  


 


Prothromb Time International


Ratio 


  1.3 


  


 


 


Activated Partial


Thromboplast Time 


  32.4 SECONDS 


  


 


 


Partial Thromboplastin Ratio  1.2  


 


Sodium Level  134 mmol/L  134 mmol/L 


 


Potassium Level  4.6 mmol/L  4.1 mmol/L 


 


Chloride Level  99 mmol/L  97 mmol/L 


 


Carbon Dioxide Level  26 mmol/L  30 mmol/L 


 


Anion Gap  9.0 mmol/L  7.0 mmol/L 


 


Blood Urea Nitrogen  41 mg/dl  38 mg/dl 


 


Creatinine  1.41 mg/dl  1.48 mg/dl 


 


Est Creatinine Clear Calc


Drug Dose 


  38.9 ml/min 


  36.9 ml/min 


 


 


Estimated GFR (


American) 


  51.5 


  48.6 


 


 


Estimated GFR (Non-


American 


  44.5 


  41.9 


 


 


BUN/Creatinine Ratio  29.0  25.9 


 


Random Glucose  97 mg/dl  146 mg/dl 


 


Calcium Level  10.5 mg/dl  10.8 mg/dl 


 


Total Bilirubin  1.7 mg/dl  


 


Direct Bilirubin  0.8 mg/dl  


 


Aspartate Amino Transf


(AST/SGOT) 


  32 U/L 


  


 


 


Alanine Aminotransferase


(ALT/SGPT) 


  22 U/L 


  


 


 


Alkaline Phosphatase  102 U/L  


 


Total Creatine Kinase  61 U/L  


 


Creatine Kinase MB  2.5 ng/ml  


 


Creatine Kinase MB Ratio  4.1  


 


Troponin I  0.157 ng/ml  0.190 ng/ml 


 


Total Protein  6.1 gm/dl  


 


Albumin  2.8 gm/dl  


 


Lipase  388 U/L  


 


Magnesium Level   2.4 mg/dl 


 


Vitamin B12 Level   717 pg/mL 


 


Folate   17.67 ng/mL 


 


Thyroid Stimulating Hormone


(TSH) 


  


  1.060 uIu/ml 


 








 (Norma Garcia, KENNY)





Assessment and Plan


86 y/o M Hx diastolic CHF, AF, HTN, HPL, BPH.  Presents from primary MDs office 

for progressive edema.  The pt is under the impression that he attended the 

hospital due to L hip pain which he has had for at least a few moths.  He was 

due to see an orthopedist for this in the coming week.   He states that he was 

told he is having hip pain because he "needs the water drained out of his leg". 

When questioned, he admits to persistent SOB which is more pronounced with 

exertion.  He denies CP, N/V, fevers or dysuria.


Initial exam, and imaging are consistent with significant volume overload.  An 

EKG is reveals A flutter with a controlled rate.  He may be developing 

cellulitis of his RLE as well.  





Acute Decompensated Diastolic CHF:


- Reporting dry weight is 194 lbs however currently is less than this and 

significantly hypervolemic


- Continue Bumex 2 mg IV QID and continue to monitor kidney function and 

electrolytes


- Cardioloyg following - appreciate recommendations





Large L Pleural Effusion:


- Will get F/U CXR tomorrow - will see if any improvement with diuresis


- Possible need for thoracentesis - could possibly deferred to outpatient as 

this has been progessive; can see clinical response of diuresis first - would 

need Apixaban held if thoracentesis planned





A Flutter/A Fib: Rate Controlled


- Echo (March 2017) - EF mildly reduced at 50-55%


- Has had significant bradycardia in 30-40%


- Toprol XL at 12.5 mg BID and Eliquis 5 mg BID





RLE Cellulitis:


- Rocephin 1 g IV daily





Mild DISHA:


- Staying mildly elevated and will continue to monitor with diuresis


- Hold Lisinopril given diuresis; BP are adequate to hold this





Chronic Progressive Hip Pain:


- Plans to have outpatient orthopedic consultation; will obtain PT/Ot 

evaluations and assess ambulatory status





Code Status: FULL RESUSCITATION





DVT Prophylaxis: Eliquis





Disposition: Attempt aggressive diuresis; will obtain PT/OT evaluations


Continued [facility] stay due to:  multiple IV medications needed


Discharge planning:  uncertain


 (Norma Garcia, PA-C)


Attending Attestation & Progress Note;


Pt seen/examined, chart reviewed, care plan d/w ANTONIO Garcia.


I agree w/ the smith components of her documentation.





Pt states "I feel a little better."


c/o left hip pain.





tele with bradycardia at times with rates in the 30s; beta blocker held. 


tele shows a flutter/fib





VSS


no fever


net neg I/O balance since admit





gen - eating during my visit, NAD


neck - JVD to the jaw


heart - irregular, chinedu, s1, s2


lungs - decreased BS left base, crackles (faint) left base; crackles right base


abd - deferred - I could not examine his abdomen as he was eating his meal


ext - 3-4+ pitting edema to the thighs; pulses 2+ b/l 


musculo - left hip - tender to palpation over left trochanteric bursal region





BMP acceptable


TSH wnl 


platelets 116


Na 134


Cr 1.3





A/P:


1.  acute/chronic diastolic CHF with acute/chronic cor pulmonale (previous echo 

- 3/2017 - with RV dysfunction) - continue bumex diuresis. 


Appreciate Dr. Kocher's consult


2.  left hip pain - suspect trochanteric bursitis - offer steroid injection 

once #1 is improved; recent left hip x-rays with mild OA onlyu


3.  slow a flutter - beta blocker held - Dr. Kocher aware of bradycardia 


4.  acute kidney injury - due to cardiorenal from #1 - bmp daily


5.  mild thrombocytopenia - uncertain etiology - b12/folate wnl - follow 


6.  mild hyponatremia - due to hypervolemia from #1 - should improve with 

diuresis





Zheng Torres MD


 (Zheng Torres MD)

## 2018-02-15 NOTE — CARDIOLOGY CONSULTATION
Cardiology Consultation


Date of Consultation:


Feb 15, 2018.


Requesting Physician:


Adri


Reason for Consultation:


Heart failure


Pt evaluation today including:  conversation w/ patient, physical exam, chart 

review, lab review, review of studies, review of inpatient medication list, 

conversation w/ attending


History of Present Illness


The patient is an 87-year-old gentleman with a history of diastolic heart 

failure and atrial fibrillation who presented to the emergency room yesterday 

complaining of left hip discomfort.  Seems that the patient has been having 

some left hip discomfort recently which has impaired his ambulation.  He has 

sustained a recent fall which resulted in a mild injury to the left hip and 

left arm.  Historically his daughter states that he has had several falls 

recently.  According to the patient this is due to difficulty with ambulation 

and poor judgment when attempting to sit down.  He did not endorse worsening 

dizziness.  Dizziness did not appear to be a symptom associated with these 

episodes.  He states that he will get somewhat dizzy if he stands up too quickly

, but generally takes his time when performing this maneuver.  He has been very 

sedentary over the course of the winter.  He rarely ventures outdoors because 

it is too cold.  He does notice significant dyspnea even when ambulating around 

his residence.  He cannot recall if this is been better or worse over the past 

few months.  When it comes to sleeping he generally sleeps in a recliner.  He 

reports this is due to discomfort in his hip and leg.  He did not endorse 

symptoms of orthopnea.  He has noticed some chronic swelling in his lower 

extremities.  He has not been aware of any palpitations or pounding heartbeats.

  Denies any recent constitutional symptoms such as fevers or chills.  He 

claims to be compliant with his home medications.  He claims to follow low-

sodium diet.





Past Medical/Surgical History


Atrial fibrillation, permanent


Pericardial effusion, chronic


Gastrointestinal hemorrhage


Arthritis


Benign prostatic hypertrophy


Peptic ulcer disease


Gout


Hiatal hernia


Hyperparathyroidism


Hypertension


Macular degeneration





Past surgical history





Colostomy and closure


Exploratory laparotomy


Parathyroid surgery he


Tracheostomy





Family History





Insignificant due to advanced age 


Noncontributory given his advanced age





Social History


Smoking Status:  Never Smoker


History of Alcohol Use:  No


Currently lives independently with his wife





Review of Systems


Per HPI.


All Other Systems:  Reviewed and Negative





Allergies


Coded Allergies:  


     No Known Allergies (Verified , 2/14/18)





Medications





Current Inpatient Medications








 Medications


  (Trade)  Dose


 Ordered  Sig/Rudy


 Route  Start Time


 Stop Time Status Last Admin


Dose Admin


 


 Finasteride


  (Proscar Tab)  5 mg  QAM


 PO  2/15/18 09:00


 3/17/18 08:59  2/15/18 07:51


5 MG


 


 Lisinopril


  (Zestril Tab)  10 mg  BID


 PO  2/15/18 09:00


 3/17/18 08:59  2/15/18 07:51


10 MG


 


 Metoprolol


 Succinate


  (Toprol Xl Tab)  12.5 mg  BID


 PO  2/15/18 09:00


 3/17/18 08:59   


 


 


 Pantoprazole


 Sodium


  (Protonix Tab)  40 mg  DAILY


 PO  2/15/18 09:00


 3/17/18 08:59  2/15/18 07:50


40 MG


 


 Potassium Chloride


  (Klor-Con Tab)  20 meq  DAILY


 PO  2/15/18 09:00


 3/17/18 08:59  2/15/18 07:52


20 MEQ


 


 Senna/Docusate


 Sodium


  (Senokot S Tab)  1 tab  BID


 PO  2/15/18 09:00


 3/17/18 08:59  2/15/18 07:50


1 TAB


 


 Tamsulosin HCl


  (Flomax Cap)  0.4 mg  HS


 PO  2/15/18 21:00


 3/17/18 20:59   


 


 


 Tramadol HCl


  (Ultram Tab)  50 mg  Q6H  PRN


 PO  2/14/18 21:30


 3/16/18 21:29   


 


 


 Apixaban


  (Eliquis Tab)  5 mg  BID


 PO  2/15/18 09:00


 3/17/18 08:59  2/15/18 07:50


5 MG


 


 Albuterol Sulfate


  (Ventolin 0.083%


 2.5MG/3ML Neb)  2.5 mg  Q6R  PRN


 INH  2/14/18 21:30


 3/16/18 21:29   


 


 


 Acetaminophen


  (Tylenol Tab)  650 mg  Q4H  PRN


 PO  2/14/18 22:00


 3/16/18 21:59   


 


 


 Al Hydrox/Mg


 Hydrox/Simethicone


  (Maalox Max Susp)  15 ml  Q4H  PRN


 PO  2/14/18 22:00


 3/16/18 21:59   


 


 


 Magnesium


 Hydroxide


  (Milk Of


 Magnesia Susp)  30 ml  Q12H  PRN


 PO  2/14/18 22:00


 3/16/18 21:59   


 


 


 Ondansetron HCl


  (Zofran Inj)  4 mg  Q6H  PRN


 IV  2/14/18 22:00


 3/16/18 21:59   


 


 


 Morphine Sulfate


  (MoRPHine


 SULFATE INJ)  2 mg  Q30M  PRN


 IV  2/14/18 22:00


 2/28/18 21:59   


 


 


 Polyethylene


  (Miralax Powder


 Packet)  17 gm  DAILY  PRN


 PO  2/14/18 22:00


 3/16/18 21:59   


 


 


 Bumetanide 2 mg/


 Syringe  8 ml @ 4


 mls/min  QID


 IV  2/15/18 09:00


 3/17/18 08:59  2/15/18 07:47


4 MLS/MIN











Physical Exam





Vital Signs Past 12 Hours








  Date Time  Temp Pulse Resp B/P (MAP) Pulse Ox O2 Delivery O2 Flow Rate FiO2


 


2/15/18 08:00     98 Nasal Cannula 2.0 


 


2/15/18 07:09 36.3 58 18 110/67 (81) 97  2.0 


 


2/15/18 03:39 36.3 52 20 117/61 (79) 97 Nasal Cannula 2.0 


 


2/15/18 03:35      Nasal Cannula 2.0 


 


2/15/18 00:26      Nasal Cannula 2.0 


 


2/14/18 22:53 37.6 52 22 134/78 95 Nasal Cannula 2.0 


 


2/14/18 22:38  63 26  92   


 


2/14/18 22:17    133/73    


 


2/14/18 22:08  58 20     


 


2/14/18 21:43    127/67    


 


2/14/18 21:38  50 23  93   


 


2/14/18 21:20  56      


 


2/14/18 21:08  56 23  94   











Data


Laboratory Results:





Last 24 Hours








Test


  2/14/18


17:15 2/14/18


17:28 2/15/18


08:57


 


Urine Color YELLOW   


 


Urine Appearance CLOUDY   


 


Urine pH 5.5   


 


Urine Specific Gravity 1.009   


 


Urine Protein NEG   


 


Urine Glucose (UA) NEG   


 


Urine Ketones NEG   


 


Urine Occult Blood NEG   


 


Urine Nitrite NEG   


 


Urine Bilirubin NEG   


 


Urine Urobilinogen NEG   


 


Urine Leukocyte Esterase TRACE   


 


Urine WBC (Auto) 1-5 /hpf   


 


Urine RBC (Auto) 0-4 /hpf   


 


Urine Hyaline Casts (Auto) 1-5 /lpf   


 


Urine Epithelial Cells (Auto) 5-10 /lpf   


 


Urine Bacteria (Auto) NEG   


 


White Blood Count  6.26 K/uL  


 


Red Blood Count  4.97 M/uL  


 


Hemoglobin  15.7 g/dL  


 


Hematocrit  45.3 %  


 


Mean Corpuscular Volume  91.1 fL  


 


Mean Corpuscular Hemoglobin  31.6 pg  


 


Mean Corpuscular Hemoglobin


Concent 


  34.7 g/dl 


  


 


 


Platelet Count  115 K/uL  


 


Mean Platelet Volume  10.5 fL  


 


Neutrophils (%) (Auto)  69.0 %  


 


Lymphocytes (%) (Auto)  16.3 %  


 


Monocytes (%) (Auto)  13.7 %  


 


Eosinophils (%) (Auto)  0.3 %  


 


Basophils (%) (Auto)  0.5 %  


 


Neutrophils # (Auto)  4.32 K/uL  


 


Lymphocytes # (Auto)  1.02 K/uL  


 


Monocytes # (Auto)  0.86 K/uL  


 


Eosinophils # (Auto)  0.02 K/uL  


 


Basophils # (Auto)  0.03 K/uL  


 


RDW Standard Deviation  55.4 fL  


 


RDW Coefficient of Variation  16.9 %  


 


Immature Granulocyte % (Auto)  0.2 %  


 


Immature Granulocyte # (Auto)  0.01 K/uL  


 


Erythrocyte Sedimentation Rate  5 mm/hr  


 


Prothrombin Time  13.2 SECONDS  


 


Prothromb Time International


Ratio 


  1.3 


  


 


 


Activated Partial


Thromboplast Time 


  32.4 SECONDS 


  


 


 


Partial Thromboplastin Ratio  1.2  


 


Sodium Level  134 mmol/L  


 


Potassium Level  4.6 mmol/L  


 


Chloride Level  99 mmol/L  


 


Carbon Dioxide Level  26 mmol/L  


 


Anion Gap  9.0 mmol/L  


 


Blood Urea Nitrogen  41 mg/dl  


 


Creatinine  1.41 mg/dl  


 


Est Creatinine Clear Calc


Drug Dose 


  38.9 ml/min 


  


 


 


Estimated GFR (


American) 


  51.5 


  


 


 


Estimated GFR (Non-


American 


  44.5 


  


 


 


BUN/Creatinine Ratio  29.0  


 


Random Glucose  97 mg/dl  


 


Calcium Level  10.5 mg/dl  


 


Total Bilirubin  1.7 mg/dl  


 


Direct Bilirubin  0.8 mg/dl  


 


Aspartate Amino Transf


(AST/SGOT) 


  32 U/L 


  


 


 


Alanine Aminotransferase


(ALT/SGPT) 


  22 U/L 


  


 


 


Alkaline Phosphatase  102 U/L  


 


Total Creatine Kinase  61 U/L  


 


Creatine Kinase MB  2.5 ng/ml  


 


Creatine Kinase MB Ratio  4.1  


 


Troponin I  0.157 ng/ml  


 


Total Protein  6.1 gm/dl  


 


Albumin  2.8 gm/dl  


 


Lipase  388 U/L  








Imaging:  Chest x-ray demonstrated bilateral pleural effusions left greater 

than right





EKG:  Atrial fibrillation with slow ventricular response





Telemetry reviewed:  Atrial fibrillation





Echocardiogram obtained 10/04/2017:  Severe LVH, preserved LV systolic function

, mild aortic regurgitation, moderate right pleural effusion, large pericardial 

effusion





Assessment & Plan


1. Acute on chronic decompensated diastolic heart failure:  Patient has 

significant peripheral edema and a large pleural effusion.  He has been on an 

outpatient regimen of oral diuretics which appears to be inadequate in 

controlling his heart failure.  Curiously, the patient's main concern is his 

hip discomfort.  The progressive edema and decompensation has been relatively 

longstanding.  Hopefully we can obtain an adequate diuresis here in the 

hospital with intravenous diuretics.  We will need to monitor his kidney 

function and electrolytes closely.  He is known to have a pericardial effusion 

which occasionally can cause some filling compromise with acutely lower filling 

pressures. However, his effusion is very chronic in nature and I would still 

advocate aggressive diuresis.





2.  Atrial fibrillation:  Chronic.  It seems that his outpatient dose of 

metoprolol was 25 milligrams of succinate daily.  He is currently on 12 a 0.5 

milligrams of succinate which seems reasonable given his overall ventricular 

rates.  He has been continued on Eliquis.  He has no symptoms of palpitations.





3. Pleural effusion:  This is quite large on x-ray.  I think it will be hard to 

affect elimination of this effusion with simple diuresis, and he may benefit 

from thoracentesis.





4. Elevated troponin:  This is chronic.  He had higher markers during a prior 

admission.  He has not reported symptoms of chest discomfort or coronary 

insufficiency.  Cardiac perfusion study performed in March 2017 did not 

demonstrate reversible ischemia.





5. Pericardial effusion:  This is chronic in nature.  At some point re-

evaluation would be reasonable.  I think this would be best performed after 

some diuresis.  Depending on his clinical course we could consider re-

evaluation or possibly even pericardiocentesis.

## 2018-02-16 VITALS
SYSTOLIC BLOOD PRESSURE: 111 MMHG | TEMPERATURE: 97.34 F | DIASTOLIC BLOOD PRESSURE: 66 MMHG | HEART RATE: 57 BPM | OXYGEN SATURATION: 97 %

## 2018-02-16 VITALS
HEART RATE: 58 BPM | TEMPERATURE: 97.88 F | SYSTOLIC BLOOD PRESSURE: 103 MMHG | OXYGEN SATURATION: 94 % | DIASTOLIC BLOOD PRESSURE: 61 MMHG

## 2018-02-16 VITALS
DIASTOLIC BLOOD PRESSURE: 56 MMHG | SYSTOLIC BLOOD PRESSURE: 110 MMHG | HEART RATE: 60 BPM | TEMPERATURE: 97.52 F | OXYGEN SATURATION: 90 %

## 2018-02-16 VITALS
HEART RATE: 58 BPM | OXYGEN SATURATION: 96 % | SYSTOLIC BLOOD PRESSURE: 119 MMHG | DIASTOLIC BLOOD PRESSURE: 68 MMHG | TEMPERATURE: 97.7 F

## 2018-02-16 VITALS
HEART RATE: 60 BPM | DIASTOLIC BLOOD PRESSURE: 64 MMHG | SYSTOLIC BLOOD PRESSURE: 115 MMHG | OXYGEN SATURATION: 96 % | TEMPERATURE: 97.7 F

## 2018-02-16 VITALS
SYSTOLIC BLOOD PRESSURE: 99 MMHG | DIASTOLIC BLOOD PRESSURE: 56 MMHG | OXYGEN SATURATION: 95 % | TEMPERATURE: 97.52 F | HEART RATE: 51 BPM

## 2018-02-16 VITALS
DIASTOLIC BLOOD PRESSURE: 63 MMHG | OXYGEN SATURATION: 95 % | TEMPERATURE: 97.34 F | HEART RATE: 58 BPM | SYSTOLIC BLOOD PRESSURE: 123 MMHG

## 2018-02-16 VITALS — OXYGEN SATURATION: 96 %

## 2018-02-16 LAB
BUN SERPL-MCNC: 38 MG/DL (ref 7–18)
CALCIUM SERPL-MCNC: 10.2 MG/DL (ref 8.5–10.1)
CO2 SERPL-SCNC: 33 MMOL/L (ref 21–32)
CREAT SERPL-MCNC: 1.39 MG/DL (ref 0.6–1.4)
EOSINOPHIL NFR BLD AUTO: 116 K/UL (ref 130–400)
GLUCOSE SERPL-MCNC: 110 MG/DL (ref 70–99)
HCT VFR BLD CALC: 44.6 % (ref 42–52)
HGB BLD-MCNC: 15.1 G/DL (ref 14–18)
MCH RBC QN AUTO: 31.3 PG (ref 25–34)
MCHC RBC AUTO-ENTMCNC: 33.9 G/DL (ref 32–36)
MCV RBC AUTO: 92.3 FL (ref 80–100)
PMV BLD AUTO: 10.7 FL (ref 7.4–10.4)
POTASSIUM SERPL-SCNC: 4.1 MMOL/L (ref 3.5–5.1)
RED CELL DISTRIBUTION WIDTH CV: 16.7 % (ref 11.5–14.5)
RED CELL DISTRIBUTION WIDTH SD: 55.9 FL (ref 36.4–46.3)
SODIUM SERPL-SCNC: 134 MMOL/L (ref 136–145)
WBC # BLD AUTO: 6.4 K/UL (ref 4.8–10.8)

## 2018-02-16 RX ADMIN — BUMETANIDE SCH MLS/MIN: 0.25 INJECTION INTRAMUSCULAR; INTRAVENOUS at 13:21

## 2018-02-16 RX ADMIN — BUMETANIDE SCH MLS/MIN: 0.25 INJECTION INTRAMUSCULAR; INTRAVENOUS at 17:27

## 2018-02-16 RX ADMIN — APIXABAN SCH MG: 2.5 TABLET, FILM COATED ORAL at 08:25

## 2018-02-16 RX ADMIN — BUMETANIDE SCH MLS/MIN: 0.25 INJECTION INTRAMUSCULAR; INTRAVENOUS at 08:24

## 2018-02-16 RX ADMIN — FINASTERIDE SCH MG: 5 TABLET, FILM COATED ORAL at 08:24

## 2018-02-16 RX ADMIN — BUMETANIDE SCH MLS/MIN: 0.25 INJECTION INTRAMUSCULAR; INTRAVENOUS at 20:00

## 2018-02-16 RX ADMIN — STANDARDIZED SENNA CONCENTRATE AND DOCUSATE SODIUM SCH TAB: 8.6; 5 TABLET ORAL at 08:24

## 2018-02-16 RX ADMIN — TAMSULOSIN HYDROCHLORIDE SCH MG: 0.4 CAPSULE ORAL at 20:00

## 2018-02-16 RX ADMIN — PANTOPRAZOLE SCH MG: 40 TABLET, DELAYED RELEASE ORAL at 08:24

## 2018-02-16 RX ADMIN — STANDARDIZED SENNA CONCENTRATE AND DOCUSATE SODIUM SCH TAB: 8.6; 5 TABLET ORAL at 20:00

## 2018-02-16 RX ADMIN — CEFTRIAXONE SODIUM SCH MLS/HR: 1 INJECTION, POWDER, FOR SOLUTION INTRAVENOUS at 13:21

## 2018-02-16 RX ADMIN — POTASSIUM CHLORIDE SCH MEQ: 1500 TABLET, EXTENDED RELEASE ORAL at 08:25

## 2018-02-16 NOTE — CARDIOLOGY FOLLOW-UP
Subjective


Date of Service:


Feb 16, 2018.


Pt evaluation today including:  conversation w/ patient, physical exam, chart 

review, lab review, review of studies, review of inpatient medication list


History of Present Illness


This morning Mr. jose c menard claims to be feeling well.  He did not sleep much.  

However, he was lying flat in bed did not describe any breathing difficulty.  

His main concern is left hip discomfort.  He claims to have ambulated to the 

bathroom yesterday and was short of breath.  However he did not describe 

significant dizziness.  He is not aware of any palpitations.





Social History


Smoking Status:  Never Smoker


History of Alcohol Use:  No





Review of Systems


Respiratory:  + cough, + dyspnea on exertion, No dyspnea at rest


Cardiac:  No chest pain


Per HPI.





Objective





Vital Signs Past 12 Hours








  Date Time  Temp Pulse Resp B/P (MAP) Pulse Ox O2 Delivery O2 Flow Rate FiO2


 


2/16/18 07:30 36.4 60 18 110/56 (74) 90  2.0 


 


2/16/18 04:00      Nasal Cannula 2.0 


 


2/16/18 03:25 36.6 58 16 103/61 (75) 94 Nasal Cannula 2.0 


 


2/16/18 00:01      Nasal Cannula 2.0 


 


2/15/18 23:45 36.6 58 22 103/56 (72) 96 Nasal Cannula 2.0 








Last Recorded Weight-Kilograms:  86.000


Data


Laboratory Results:





Last 24 Hours








Test


  2/16/18


05:28


 


White Blood Count 6.40 K/uL 


 


Red Blood Count 4.83 M/uL 


 


Hemoglobin 15.1 g/dL 


 


Hematocrit 44.6 % 


 


Mean Corpuscular Volume 92.3 fL 


 


Mean Corpuscular Hemoglobin 31.3 pg 


 


Mean Corpuscular Hemoglobin


Concent 33.9 g/dl 


 


 


RDW Standard Deviation 55.9 fL 


 


RDW Coefficient of Variation 16.7 % 


 


Platelet Count 116 K/uL 


 


Mean Platelet Volume 10.7 fL 


 


Sodium Level 134 mmol/L 


 


Potassium Level 4.1 mmol/L 


 


Chloride Level 96 mmol/L 


 


Carbon Dioxide Level 33 mmol/L 


 


Anion Gap 5.0 mmol/L 


 


Blood Urea Nitrogen 38 mg/dl 


 


Creatinine 1.39 mg/dl 


 


Est Creatinine Clear Calc


Drug Dose 39.3 ml/min 


 


 


Estimated GFR (


American) 52.4 


 


 


Estimated GFR (Non-


American 45.2 


 


 


BUN/Creatinine Ratio 27.0 


 


Random Glucose 110 mg/dl 


 


Calcium Level 10.2 mg/dl 


 


Magnesium Level 2.1 mg/dl 


 


Chemistry Specimen Hemolysis  














Telemetry reviewed:  Atrial fibrillation with controlled rates





Assessment and Plan


1. Acute on chronic decompensated diastolic heart failure:  He did effect some 

diuresis yesterday although on a very aggressive diuretic regimen.  His renal 

function appears to be stable and would seem reasonable to continue attempts at 

diuresis.  His beta-blocker and ACE-inhibitor are currently being held.  I 

think we can readdress use of these medications once his acute decompensation 

has resolved.





2.  Atrial fibrillation:  Chronic.  I am not entirely clear regarding his 

outpatient dose.  There was some concern regarding bradycardia during this 

admission in his beta-blockers currently being held.  I think we can readdress 

this once his acute decompensation has resolved





3. Pleural effusion:  This is quite large on x-ray.  I think it will be hard to 

affect elimination of this effusion with simple diuresis, and he may benefit 

from thoracentesis.





4. Elevated troponin:  Chronic.  No symptoms of chest discomfort





5. Pericardial effusion:  Chronic.  I think if he continues to diurese well 

over the course of the weekend we can re-evaluate his pericardial effusion 

early next week.  This will also give us an idea regarding his systolic 

function.





Dr. Pedersen will be covering over the weekend.  I would simply continue the 

patient's diuresis during that period of time.  If there are other acute issues 

please contact him with questions in that regard.

## 2018-02-16 NOTE — HOSPITALIST PROGRESS NOTE
Hospitalist Progress Note


Date of Service


Feb 16, 2018.


 (Norma Garcia PA-C)





Subjective


Pt evaluation today including:  conversation w/ patient, physical exam, chart 

review, lab review, review of studies, review of inpatient medication list


Patient seen and evaluated. No acute events overnight. Continues in low rate A 

Fib/Flutter. Averages in the 50s bpm.


States he feels about the same. No SOB at rest but some GARCIA. Not requiring 

supplemental O2 at rest. 


RLE erythema is improving. Cr has stabilized and electrolytes are stable.





   Constitutional:  No fever, No chills


   ENT:  No nasal symptoms


   Respiratory:  + cough, + dyspnea on exertion, No sputum, No dyspnea at rest


   Cardiovascular:  No chest pain


   Abdomen:  No pain, No nausea, No vomiting


   Musculoskeletal:  + joint pain (L hip), + swelling (b/l lower extremities)


   Male :  No dysuria (Norma Garcia PA-C)





Medications





Current Inpatient Medications








 Medications


  (Trade)  Dose


 Ordered  Sig/Rudy


 Route  Start Time


 Stop Time Status Last Admin


Dose Admin


 


 Finasteride


  (Proscar Tab)  5 mg  QAM


 PO  2/15/18 09:00


 3/17/18 08:59  2/16/18 08:24


5 MG


 


 Lisinopril


  (Zestril Tab)  10 mg  BID


 PO  2/15/18 09:00


 3/17/18 08:59 Future Hold 2/15/18 07:51


10 MG


 


 Metoprolol


 Succinate


  (Toprol Xl Tab)  12.5 mg  BID


 PO  2/15/18 09:00


 3/17/18 08:59 Future Hold  


 


 


 Pantoprazole


 Sodium


  (Protonix Tab)  40 mg  DAILY


 PO  2/15/18 09:00


 3/17/18 08:59  2/16/18 08:24


40 MG


 


 Potassium Chloride


  (Klor-Con Tab)  20 meq  DAILY


 PO  2/15/18 09:00


 3/17/18 08:59  2/16/18 08:25


20 MEQ


 


 Senna/Docusate


 Sodium


  (Senokot S Tab)  1 tab  BID


 PO  2/15/18 09:00


 3/17/18 08:59  2/16/18 08:24


1 TAB


 


 Tamsulosin HCl


  (Flomax Cap)  0.4 mg  HS


 PO  2/15/18 21:00


 3/17/18 20:59  2/15/18 21:10


0.4 MG


 


 Tramadol HCl


  (Ultram Tab)  50 mg  Q6H  PRN


 PO  2/14/18 21:30


 3/16/18 21:29  2/16/18 00:31


50 MG


 


 Apixaban


  (Eliquis Tab)  5 mg  BID


 PO  2/15/18 09:00


 3/17/18 08:59  2/16/18 08:25


5 MG


 


 Albuterol Sulfate


  (Ventolin 0.083%


 2.5MG/3ML Neb)  2.5 mg  Q6R  PRN


 INH  2/14/18 21:30


 3/16/18 21:29   


 


 


 Acetaminophen


  (Tylenol Tab)  650 mg  Q4H  PRN


 PO  2/14/18 22:00


 3/16/18 21:59   


 


 


 Al Hydrox/Mg


 Hydrox/Simethicone


  (Maalox Max Susp)  15 ml  Q4H  PRN


 PO  2/14/18 22:00


 3/16/18 21:59   


 


 


 Magnesium


 Hydroxide


  (Milk Of


 Magnesia Susp)  30 ml  Q12H  PRN


 PO  2/14/18 22:00


 3/16/18 21:59   


 


 


 Ondansetron HCl


  (Zofran Inj)  4 mg  Q6H  PRN


 IV  2/14/18 22:00


 3/16/18 21:59   


 


 


 Morphine Sulfate


  (MoRPHine


 SULFATE INJ)  2 mg  Q30M  PRN


 IV  2/14/18 22:00


 2/28/18 21:59   


 


 


 Polyethylene


  (Miralax Powder


 Packet)  17 gm  DAILY  PRN


 PO  2/14/18 22:00


 3/16/18 21:59   


 


 


 Bumetanide 2 mg/


 Syringe  8 ml @ 4


 mls/min  QID


 IV  2/15/18 09:00


 3/17/18 08:59  2/16/18 08:24


4 MLS/MIN


 


 Ceftriaxone


 Sodium 1 gm/


 Dextrose  50 ml @ 


 100 mls/hr  Q24H


 IV  2/15/18 14:30


 2/25/18 14:29  2/15/18 14:52


100 MLS/HR








 (Norma Garcia, PA-C)





Objective


Vital Signs











  Date Time  Temp Pulse Resp B/P (MAP) Pulse Ox O2 Delivery O2 Flow Rate FiO2


 


2/16/18 07:30 36.4 60 18 110/56 (74) 90  2.0 


 


2/16/18 04:00      Nasal Cannula 2.0 


 


2/16/18 03:25 36.6 58 16 103/61 (75) 94 Nasal Cannula 2.0 


 


2/16/18 00:01      Nasal Cannula 2.0 


 


2/15/18 23:45 36.6 58 22 103/56 (72) 96 Nasal Cannula 2.0 


 


2/15/18 20:00 36.8  20 107/61 (76) 96 Nasal Cannula 2.0 


 


2/15/18 20:00      Nasal Cannula 2.0 


 


2/15/18 16:19      Nasal Cannula  


 


2/15/18 15:30 36.2 45 19 98/54 (69) 98 Nasal Cannula 2.0 


 


2/15/18 12:00      Nasal Cannula 2.0 


 


2/15/18 12:00 36.6 74 16 100/62 (75) 95  2.0 








 (Norma Garcia, PA-C)





Physical Exam


General Appearance:  no apparent distress


Neck:  supple, trachea midline, + JVD


Respiratory/Chest:  no respiratory distress, no accessory muscle use, + crackles

 (bases b/l)


Cardiovascular:  regular rate, rhythm


Abdomen:  normal bowel sounds, non tender, soft


Extremities:  + swelling (2+ pitting edema of LLE to thigh and 2+ pitting edema 

to knee of RLE)


Neurologic/Psychiatric:  alert


Skin:  + pertinent finding (erythema of RLE with superficial skin abrasion - 

improving on anterior portion of perez)


 (Norma Garcia, PA-C)





Laboratory Results





Last 24 Hours








Test


  2/15/18


09:04 2/16/18


05:28


 


Sodium Level 134 mmol/L  134 mmol/L 


 


Potassium Level 4.1 mmol/L  4.1 mmol/L 


 


Chloride Level 97 mmol/L  96 mmol/L 


 


Carbon Dioxide Level 30 mmol/L  33 mmol/L 


 


Anion Gap 7.0 mmol/L  5.0 mmol/L 


 


Blood Urea Nitrogen 38 mg/dl  38 mg/dl 


 


Creatinine 1.48 mg/dl  1.39 mg/dl 


 


Est Creatinine Clear Calc


Drug Dose 36.9 ml/min 


  39.3 ml/min 


 


 


Estimated GFR (


American) 48.6 


  52.4 


 


 


Estimated GFR (Non-


American 41.9 


  45.2 


 


 


BUN/Creatinine Ratio 25.9  27.0 


 


Random Glucose 146 mg/dl  110 mg/dl 


 


Calcium Level 10.8 mg/dl  10.2 mg/dl 


 


Magnesium Level 2.4 mg/dl  2.1 mg/dl 


 


Troponin I 0.190 ng/ml  


 


Vitamin B12 Level 717 pg/mL  


 


Folate 17.67 ng/mL  


 


Thyroid Stimulating Hormone


(TSH) 1.060 uIu/ml 


  


 


 


White Blood Count  6.40 K/uL 


 


Red Blood Count  4.83 M/uL 


 


Hemoglobin  15.1 g/dL 


 


Hematocrit  44.6 % 


 


Mean Corpuscular Volume  92.3 fL 


 


Mean Corpuscular Hemoglobin  31.3 pg 


 


Mean Corpuscular Hemoglobin


Concent 


  33.9 g/dl 


 


 


RDW Standard Deviation  55.9 fL 


 


RDW Coefficient of Variation  16.7 % 


 


Platelet Count  116 K/uL 


 


Mean Platelet Volume  10.7 fL 


 


Chemistry Specimen Hemolysis   








 (Norma Garcia, KENNY)





Assessment and Plan


88 y/o M Hx diastolic CHF, AF, HTN, HPL, BPH.  Presents from primary MDs office 

for progressive edema.  The pt is under the impression that he attended the 

hospital due to L hip pain which he has had for at least a few moths.  He was 

due to see an orthopedist for this in the coming week.   He states that he was 

told he is having hip pain because he "needs the water drained out of his leg". 

When questioned, he admits to persistent SOB which is more pronounced with 

exertion.  He denies CP, N/V, fevers or dysuria.


Initial exam, and imaging are consistent with significant volume overload.  An 

EKG is reveals A flutter with a controlled rate.  He may be developing 

cellulitis of his RLE as well.  





Acute Decompensated Diastolic CHF:


- Reporting dry weight is 194 lbs however currently is less than this and 

significantly hypervolemic - currently at a neg balance at - 2.4 L


- Continue Bumex 2 mg IV QID and continue to monitor kidney function and 

electrolytes - acceptable at this time and Cr normalized


- Cardiology following - appreciate recommendations





Large L Pleural Effusion:


- F/U CXR ordered to assess for any change with effusion


- Possible need for thoracentesis - could possibly deferred to outpatient as 

this has been progressive; can see clinical response of diuresis first - would 

need Apixaban held if thoracentesis planned





A Flutter/A Fib: Rate Controlled


- Echo (March 2017) - EF mildly reduced at 50-55%


- Has had significant bradycardia in 30-40s on 2/15 but now averaging mostly in 

the 50s


- Toprol XL at 12.5 mg BID on hold and will continue Eliquis 5 mg BID





RLE Cellulitis:


- Rocephin 1 g IV daily





Mild DISHA: RESOLVED


- Hold Lisinopril given diuresis; BP are adequate to hold this





Chronic Progressive Hip Pain: Possible Bursitis


- Plans to have outpatient orthopedic consultation





Code Status: FULL RESUSCITATION





DVT Prophylaxis: Eliquis





Disposition: Attempt aggressive diuresis


- PT recommending SNF


Continued [facility] stay due to:  multiple IV medications needed


Discharge planning:  skilled nursing facility


 (Norma Garcia PA-C)





Reviewed:  Pt Seen/Exam by Me


 (Violeta Wolff MD)


History


 Physician Assistant supervision Note:





I interviewed and examined the patient. Discussed with ANTONIO Garcia and agree with 

findings and plan as documented in the note. Any exceptions or clarifications 

are listed here:





Patient complains of dyspnea on exertion to the bathroom.  He also complains of 

significant left hip pain which is really limiting his ambulation.  He 

continues to diurese.  He denies chest pain.





Vitals reviewed, telemetry with A. fib mostly in the 50s


Gen: Alert, awake, NAD


HEENT: anicteric sclerae, EOMI


CV: Irregularly irregular, bradycardic no MGR, S1S2


Pulm: Decreased breath sounds in the left lower and middle lung fields as well 

as right base, otherwise clear


Abd: +BS soft NT ND no masses or hernias


Ext: 4+ pitting edema to the abdomen in the bilateral lower extremities, no 

calf tenderness, positive TTP over left greater trochanter


Skin: Right leg with mild blanching erythema and warmth to the knee








Patient is an 87-year-old male with a history of chronic diastolic CHF, 

permanent AF on anticoagulation with Eliquis, HTN, HPL, BPH, who presents with 

acute on chronic diastolic CHF with anasarca and enlarging bilateral left 

greater than right pleural effusions.





-Repeat chest x-ray today with minimal improvement of effusions, is on 

aggressive IV diuresis and that should continue


-Consult pulmonology to see about thoracentesis-may not need to hold the Eliquis


-Consult orthopedics due to ambulatory dysfunction which is likely secondary to 

massive lower extremity edema in addition to his significant left hip pain-

likely secondary to greater trochanteric bursitis-we will see if orthopedics 

feels steroid injection is warranted at this time to improve his ambulation


-Continue diuresis and appreciate cardiology consultation


-Cardiology recommends reassessment of pericardial effusion after diuresis 

perhaps early next week


-Cardiology also recommends readdressing adding his lisinopril and beta-blocker 

back on at some point after diuresis





Documented By:  Violeta Wolff


 (Violeta Wolff MD)

## 2018-02-16 NOTE — DIAGNOSTIC IMAGING REPORT
BILATERAL LOWER EXTREMITY VENOUS DOPPLER



CLINICAL HISTORY: Lower extremity swelling.    



COMPARISON STUDY:  Right lower extremity venous Doppler September 25, 2015. 



TECHNIQUE:  Sonography of the deep venous system of the bilateral lower

extremities was performed. Compression and augmentation were evaluated.



FINDINGS:  The bilateral common femoral, superficial femoral and popliteal veins

were compressible. Augmentation was normal. Flow was shown within the deep calf

vessels. Bilateral lower extremity edema was noted.



IMPRESSION: No evidence of deep venous thrombus within the bilateral lower

extremities.







Electronically signed by:  Edward Zaragoza M.D.

2/16/2018 9:11 PM



Dictated Date/Time:  2/16/2018 9:10 PM

## 2018-02-16 NOTE — DIAGNOSTIC IMAGING REPORT
CHEST ONE VIEW PORTABLE



HISTORY:      Dyspnea; Pleural Effusion



COMPARISON: Chest 2/14/2018.



FINDINGS: Small right and moderate left pleural effusions, unchanged. Pulmonary

vascular congestion has improved. Bibasilar densities persist. The heart remains

enlarged. No pneumothorax.



IMPRESSION:



1. No change in the small right and moderate left pleural effusions.

2. Pulmonary vascular congestion has improved.







Electronically signed by:  Faustino Kerns M.D.

2/16/2018 6:08 PM



Dictated Date/Time:  2/16/2018 6:07 PM

## 2018-02-17 VITALS
TEMPERATURE: 97.34 F | SYSTOLIC BLOOD PRESSURE: 123 MMHG | DIASTOLIC BLOOD PRESSURE: 63 MMHG | HEART RATE: 58 BPM | OXYGEN SATURATION: 95 %

## 2018-02-17 VITALS
DIASTOLIC BLOOD PRESSURE: 57 MMHG | HEART RATE: 65 BPM | SYSTOLIC BLOOD PRESSURE: 105 MMHG | OXYGEN SATURATION: 91 % | TEMPERATURE: 97.52 F

## 2018-02-17 VITALS
HEART RATE: 89 BPM | SYSTOLIC BLOOD PRESSURE: 112 MMHG | TEMPERATURE: 98.78 F | OXYGEN SATURATION: 92 % | DIASTOLIC BLOOD PRESSURE: 71 MMHG

## 2018-02-17 VITALS
SYSTOLIC BLOOD PRESSURE: 102 MMHG | HEART RATE: 67 BPM | OXYGEN SATURATION: 93 % | DIASTOLIC BLOOD PRESSURE: 65 MMHG | TEMPERATURE: 97.34 F

## 2018-02-17 VITALS
DIASTOLIC BLOOD PRESSURE: 76 MMHG | HEART RATE: 57 BPM | SYSTOLIC BLOOD PRESSURE: 116 MMHG | TEMPERATURE: 97.7 F | OXYGEN SATURATION: 92 %

## 2018-02-17 VITALS
SYSTOLIC BLOOD PRESSURE: 106 MMHG | OXYGEN SATURATION: 91 % | DIASTOLIC BLOOD PRESSURE: 64 MMHG | TEMPERATURE: 97.52 F | HEART RATE: 60 BPM

## 2018-02-17 VITALS — OXYGEN SATURATION: 96 %

## 2018-02-17 LAB
ALBUMIN SERPL-MCNC: 2.5 GM/DL (ref 3.4–5)
ALP SERPL-CCNC: 92 U/L (ref 45–117)
ALT SERPL-CCNC: 20 U/L (ref 12–78)
AST SERPL-CCNC: 28 U/L (ref 15–37)
BUN SERPL-MCNC: 35 MG/DL (ref 7–18)
CALCIUM SERPL-MCNC: 10 MG/DL (ref 8.5–10.1)
CO2 SERPL-SCNC: 34 MMOL/L (ref 21–32)
CREAT SERPL-MCNC: 1.3 MG/DL (ref 0.6–1.4)
EOSINOPHIL NFR BLD AUTO: 115 K/UL (ref 130–400)
GLUCOSE SERPL-MCNC: 86 MG/DL (ref 70–99)
HCT VFR BLD CALC: 43.8 % (ref 42–52)
HGB BLD-MCNC: 14.9 G/DL (ref 14–18)
MCH RBC QN AUTO: 31.2 PG (ref 25–34)
MCHC RBC AUTO-ENTMCNC: 34 G/DL (ref 32–36)
MCV RBC AUTO: 91.6 FL (ref 80–100)
PMV BLD AUTO: 10.5 FL (ref 7.4–10.4)
POTASSIUM SERPL-SCNC: 4 MMOL/L (ref 3.5–5.1)
PROT SERPL-MCNC: 5.9 GM/DL (ref 6.4–8.2)
RED CELL DISTRIBUTION WIDTH CV: 16.6 % (ref 11.5–14.5)
RED CELL DISTRIBUTION WIDTH SD: 55.6 FL (ref 36.4–46.3)
SODIUM SERPL-SCNC: 132 MMOL/L (ref 136–145)
WBC # BLD AUTO: 6.05 K/UL (ref 4.8–10.8)

## 2018-02-17 RX ADMIN — FINASTERIDE SCH MG: 5 TABLET, FILM COATED ORAL at 07:15

## 2018-02-17 RX ADMIN — TAMSULOSIN HYDROCHLORIDE SCH MG: 0.4 CAPSULE ORAL at 21:26

## 2018-02-17 RX ADMIN — BUMETANIDE SCH MLS/MIN: 0.25 INJECTION INTRAMUSCULAR; INTRAVENOUS at 17:01

## 2018-02-17 RX ADMIN — CEFTRIAXONE SODIUM SCH MLS/HR: 1 INJECTION, POWDER, FOR SOLUTION INTRAVENOUS at 12:33

## 2018-02-17 RX ADMIN — BUMETANIDE SCH MLS/MIN: 0.25 INJECTION INTRAMUSCULAR; INTRAVENOUS at 12:33

## 2018-02-17 RX ADMIN — PANTOPRAZOLE SCH MG: 40 TABLET, DELAYED RELEASE ORAL at 07:15

## 2018-02-17 RX ADMIN — STANDARDIZED SENNA CONCENTRATE AND DOCUSATE SODIUM SCH TAB: 8.6; 5 TABLET ORAL at 07:14

## 2018-02-17 RX ADMIN — BUMETANIDE SCH MLS/MIN: 0.25 INJECTION INTRAMUSCULAR; INTRAVENOUS at 08:14

## 2018-02-17 RX ADMIN — STANDARDIZED SENNA CONCENTRATE AND DOCUSATE SODIUM SCH TAB: 8.6; 5 TABLET ORAL at 21:26

## 2018-02-17 RX ADMIN — POTASSIUM CHLORIDE SCH MEQ: 1500 TABLET, EXTENDED RELEASE ORAL at 07:14

## 2018-02-17 RX ADMIN — BUMETANIDE SCH MLS/MIN: 0.25 INJECTION INTRAMUSCULAR; INTRAVENOUS at 21:26

## 2018-02-17 NOTE — PULMONARY CONSULTATION
History


General


Date of Service:


2018.


Stated Complaint:


Bilateral pleural effusions





HPI


The patient is a 87 year old male who presents to [facility] 

with complaints of Cellulitis Of Right Lower Extremity, Chf Exacerbat. The 

patient's primary care provider is Liliana Talavera M.D..





87-year-old male admitted for leg pain and notably having global anasarca.  

Patient has a  PmHx:  Diastolic heart failure, atrial fibrillation/on Eliquis, 

BPH with obstructive symptoms, pericardial effusion, diverticulitis status post 

perforation, hepatic lobe abscess, pseudomonal pneumonia, gastric ulcer, and 

possible sleep apnea.  Patient does continue to have dyspnea especially with 

exertion but this time denies:  Fever, chills, productive cough.  He does have 

some chest pain but is not more musculoskeletal in nature and reproducible he 

notes when he pushes on his left lower sternal border.





Serum studies


WBC:  6K


PLT: 115K


INR: 1.2


PT: >13.2


PT: >32.4


BUN/Cr: 35/1.30


Venous Doppler studies:  No evidence of DVT in the bilateral lower extremities 

no


CXR:  Bilateral costophrenic blunting left greater than right, hilar fullness, 

per vascular cuffing








Microbiology history


 Bronchial washing 2009:  Pseudomonas (resistance:  Aztreonam, cefepime, 

ceftazidime)


 Tracheostomy site 2009:  Pseudomonas (resistance pattern)


 Expectorated sputum 2009:  Pseudomonas (resistance pattern)


 Urine catheterization 2009:  Pseudomonas pansensitive


 Blood 2015:  Micrococcus species





Spirometry 09/15/2009


 Pre bronchodilator spirometrywithin normal limits


 Modest bronchodilator response at low lung volumesno definitive response to 

bronchodilator challenge








Cardiac catheterization 2017


 RA:   13/12 


 RV:   39/20 (26)


 PA:   38/30 (33)


 PCW:   20 


 TP


 TD       











Active Problems


1. Acute upper GI bleeg/Gastric Ulcer


2. A-fib (on Eliquis)


3. Arthritis 


4. BPH with obstruction/lower urinary tract symptoms 


5. Congestive heart failure 


6. Elevated prostate specific antigen 


7. Gout 


8. Hiatal hernia 


9. Hypercalcemia 


10. Hyperparathyroidism 


11. Hypertension 


12. Macular degeneration 


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 


14. Osteoporosis 


15. Pericardial effusion


16. Trochanteric bursitis 


17. History of diverticulitis


18. History of pseudomonal pneumonia


19. History of perforation of sigmoid colon due to diverticulitis


20. History of right hepatic lobe abscess


21. History of peritonitis


22. 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


23. History of primary hyperthyroidism


24. History of obstructive sleep apnea








Surgical History


1. Sigmoid colon resection, and colostomy, drainage of right hepatic lobe 

abscess


2. Colostomy Closure


3. Exploratory Laparotomy


4. Gastrostomy tube placement


5. Parathyroid Surgery


6. Parathyroid Surgery


7. Tracheostomy


8. Uvuloplasty





Family History


 Diabetes Mellitus


 Hypertension


 Nephrolithiasis





Social History


 Alcohol Use (History)


 Former smoker 


 Marital History - Currently 


 Never used moist powdered tobacco 


 No secondhand smoke exposure 


 Occupation: Retired





Current Meds


  Eliquis 5 MG Oral Tablet; 1 tablet twice a day


  Finasteride 5 MG Oral Tablet; TAKE ONE TABLET BY MOUTH ONCE DAILY AS


  Tamsulosin HCl - 0.4 MG Oral Capsule; TAKE ONE CAPSULE BY MOUTH ONCE DAILY;


  Albuterol Sulfate (2.5 MG/3ML) 0.083% Inhalation Nebulization Solution; USE 

1 UNIT


  Furosemide 40 MG Oral Tablet; Take 2 Tablets in the morning and 1 in the 

afternoon;


  Docusate Sodium 100 MG Oral Capsule; TAKE 1 CAPSULE TWICE DAILY;


  Senokot S 8.6-50 MG Oral Tablet; TAKE 1 TABLET TWICE DAILY;


  Pantoprazole Sodium 40 MG Oral Tablet Delayed Release; TAKE 1 TABLET BY MOUTH


  Glucosamine CAPS; TAKE 1 CAPSULE BY MOUTH TWICE DAILY;


  TraMADol HCl - 50 MG Oral Tablet; Take one (1) tablet(s) EVERY 6 HOURS 

asneeded for


  Vitamin D 400 UNIT TABS; TAKE 1 TABLET DAILY AS DIRECTED;


  Lisinopril 10 MG Oral Tablet; TAKE ONE TABLET BY MOUTH TWICE DAILY;


  Metoprolol Succinate ER 25 MG Oral Tablet Extended Release 24 Hour; TAKE 1/2


  Metoprolol Succinate ER 25 MG Oral Tablet Extended Release 24 Hour; TAKE ONE


  Potassium Chloride ER 20 MEQ Oral Tablet Extended Release; TAKE ONE TABLET BY


  PreserVision AREDS 2 Oral Capsule; 1 bid








Allergies


 No Known Drug Allergies


Historian:  patient, EMS





Review of Systems


Constitutional:  reports: weakness


Eyes:  reports: no symptoms


ENT:  reports: no symptoms


Cardiovascular:  reports: as stated in HPI


Respiratory:  reports: as stated in HPI


Gastrointestinal:  reports: no symptoms


Genitourinary - Male:  reports: no symptoms


Musculoskeletal:  reports: as stated in HPI


Integumentary:  reports: no symptoms


Neurologic:  reports: no symptoms


Psychiatric:  reports: no symptoms


Hematologic / Lymphatic:  no symptoms


Allergic / Immunologic:  no symptoms





Past Medical History


Past Medical History:


Please refer to HPI


Past Surgical History:


Please refer to HPI





Family History





Insignificant due to advanced age 


Please refer to HPI





Social History


Hx Tobacco Use In Past Year?:  No


Smoking Status:  Never Smoker


Marital status:  


Housing status:  lives with family


Occupational Status:  retired





Immunizations


History of Influenza Vaccine:  No


History of Tetanus Vaccine?:  Unknown


History of Pneumococcal:  Yes


History of Hepatitis B Vaccine:  Unknown





History of MDRO


History of MDRO:  No





Allergies


Coded Allergies:  


     No Known Allergies (Verified , 18)





Current Medications





Reported Home Medications








 Medications  Dose


 Route/Sig


 Max Daily Dose Days Date Category


 


 Ultram (Tramadol


 HCl) 50 Mg Tab  50 Mg


 PO Q6H PRN


    18 Reported


 


 Protonix


  (Pantoprazole


 Sodium) 40 Mg Tab  40 Mg


 PO DAILY


    18 Reported


 


 Glucosamine


  (Glucosamine


 Sulfate) 1,000 Mg


 Tab  1,000 Mg


 PO BID


    18 Reported


 


 Lasix


  (Furosemide) 40


 Mg Tab  40 Mg


 PO QPM


    18 Reported


 


 Lasix


  (Furosemide) 40


 Mg Tab  2 Tab


 PO QAM


    18 Reported


 


 Proventil 0.083%


 2.5MG/3ML


  (Albuterol Sulf)


 2.5 Mg/3 Ml Nebu  2.5 Mg


 INH QID PRN


    18 Reported


 


 Senokot S


  (Senna/Docusate


 Sodium) 1 Tab Tab  1 Tab


 PO BID


    17 Reported


 


 Klor-Con


  (Potassium


 Chloride) 20 Meq


 Tabcr  20 Meq


 PO DAILY


    17 Reported


 


 Eliquis


  (Apixaban) 5 Mg


 Tab  5 Mg


 PO BID


    17 Reported


 


 Metoprolol


 Succinate ER


  (Metoprolol


 Succinate) 25 Mg


 Tabcr  0.5 Tab


 PO BID


    17 Reported


 


 Vitamin D3


  (Cholecalciferol)


 400 Unit Cap  400 Intunit


 PO HS


    16 Reported


 


 Ocuvite


 Preservision


  (Multivitamins/Minerals)


 1 Tab Tab  1 Tab


 PO BID


    9/25/15 Reported


 


 Flomax


  (Tamsulosin Hcl)


 0.4 Mg Cap  0.4 Mg


 PO HS


    9/25/15 Reported


 


 Finasteride 5 Mg


 Tab  5 Mg


 PO QAM


    9/25/15 Reported


 


 Zestril


  (Lisinopril) 10


 Mg Tab  10 Mg


 PO BID


    11/3/09 Reported











Physical


Physical Exam


Vital Signs:











  Date Time  Temp Pulse Resp B/P (MAP) Pulse Ox O2 Delivery O2 Flow Rate FiO2


 


18 07:40 36.3 67 20 102/65 (77) 93 Room Air  


 


18 04:00     96 Nasal Cannula 2.0 


 


18 03:45 36.3 58 17 123/63 (83) 95 Nasal Cannula 3.0 


 


18 00:00     96 Nasal Cannula 2.0 


 


18 23:35 36.5 60 16 115/64 (81) 96 Nasal Cannula 2.0 


 


18 20:00     96 Nasal Cannula 2.0 


 


18 19:18 36.3 57 16 111/66 (81) 97 Nasal Cannula 2.0 


 


18 16:00      Nasal Cannula 2.0 


 


18 15:06 36.5 58 18 119/68 (85) 96 Nasal Cannula 2.0 


 


18 12:00      Nasal Cannula 2.0 


 


18 11:46 36.4 51 18 99/56 (70) 95   








General Appearance:  uncomfortable


Head:  NORMOCEPHALIC, ATRAUMATIC


Eyes:  PERRLA, NO DISCHARGE, EOMI, SCLERAE NORMAL


ENT:  NORMAL EAR EXAM, NORMAL NASAL EXAM, NORMAL MOUTH EXAM, NORMAL THROAT EXAM


Neck:  other (1+ JVD)


Respiratory:  other (Decreased breath sounds bilaterally with mild rhonchi/

thoracic ultrasound shows moderate to large left-sided pleural effusion with 

small right-sided effusion consistent with x-rays)


Cardiovasular:  other (S1-S2 distant heart sounds mild to out of 6 systolic 

murmur irregular rhythm)


Abdomen:  other (Decreased breath sounds notable anasarca at the base)


Genitourinary - Male:  other (Notable spot choroidal swelling)


Back:  NORMAL INSPECTION, NO MIDLINE TENDERNESS, NO CVA TENDERNESS, NO 

PARAVERTEBRAL TTP


Upper Extremities:  NO EDEMA, NO DEFORMITY, NORMAL ROM


Lower Extremities:  other (Two to 3+ pitting edema bilaterally)


Pulses:  carotid (R) (1+), carotid (L) (1+), dorsalis pedis (R) (0), dorsalis 

pedis (L) (0)


Neuro:  ALERT, ORIENTED x 3, NORMAL MOTOR EXAM, NORMAL SENSATION


Reflexes:  biceps (R) (2+), bicpes (L) (2+)


Babinski Testing:  right (equivocal), left (equivocal)


Psychiatric:  NORMAL AFFECT, NO SUICIDAL IDEATION





Diagnostics


Labs





Results Past 24 Hours








Test


  18


05:35 18


07:53 Range/Units


 


 


White Blood Count 6.05  4.8-10.8  K/uL


 


Red Blood Count 4.78  4.7-6.1  M/uL


 


Hemoglobin 14.9  14.0-18.0  g/dL


 


Hematocrit 43.8  42-52  %


 


Mean Corpuscular Volume 91.6    fL


 


Mean Corpuscular Hemoglobin 31.2  25-34  pg


 


Mean Corpuscular Hemoglobin


Concent 34.0


  


  32-36  g/dl


 


 


RDW Standard Deviation 55.6  36.4-46.3  fL


 


RDW Coefficient of Variation 16.6  11.5-14.5  %


 


Platelet Count 115  130-400  K/uL


 


Mean Platelet Volume 10.5  7.4-10.4  fL


 


Sodium Level 132  136-145  mmol/L


 


Potassium Level  4.0 3.5-5.1  mmol/L


 


Chloride Level 93    mmol/L


 


Carbon Dioxide Level 34  21-32  mmol/L


 


Anion Gap 5.0  3-11  mmol/L


 


Blood Urea Nitrogen 35  7-18  mg/dl


 


Creatinine


  1.30


  


  0.60-1.40


mg/dl


 


Est Creatinine Clear Calc


Drug Dose 42.0


  


   ml/min


 


 


Estimated GFR (


American) 56.9


  


   


 


 


Estimated GFR (Non-


American 49.1


  


   


 


 


BUN/Creatinine Ratio 26.6  10-20  


 


Random Glucose 86  70-99  mg/dl


 


Calcium Level 10.0  8.5-10.1  mg/dl


 


Magnesium Level  2.1 1.8-2.4  mg/dl


 


Total Bilirubin 1.5  0.2-1  mg/dl


 


Direct Bilirubin  0.7 0-0.2  mg/dl


 


Aspartate Amino Transf


(AST/SGOT) 


  28


  15-37  U/L


 


 


Alanine Aminotransferase


(ALT/SGPT) 20


  


  12-78  U/L


 


 


Alkaline Phosphatase 92    U/L


 


Lactate Dehydrogenase 207    U/L


 


Total Protein 5.9  6.4-8.2  gm/dl


 


Albumin 2.5  3.4-5.0  gm/dl











Diagnostic Radiology


Please refer to HPI





EKG


Please refer to HPI





Impression


Assessment and Plan


87-year-old male admitted with acute on chronic respiratory insufficiency:





1. Respiratory insufficiency:  Patient has had a long history of cardiac 

insufficiency/diastolic dysfunction previous echocardiograms as well as right 

heart catheterization performed 2017 shows elevated right ventricular 

pressures and/or wedge pressure.  At this time the patient does note continued 

dyspnea on exertion and and does have a moderate to large left-sided pleural 

effusion.  The primary team has held his Eliquis at this time so we can perform 

thoracentesis on the left side tomorrow.  I did speak to the patient at length 

about this procedure at this time he would like to move forward.

## 2018-02-17 NOTE — MEDICAL CONSULT
Consultation Note


Date of Service


Feb 17, 2018.





Consultation Note


Consult ordered by: Dr. Wolff.





CHIEF COMPLAINT: Left hip pain.





HISTORY OF PRESENT ILLNESS: Patient is a pleasant 87-year-old male who was 

admitted for persistent shortness of breath she is more pronounced with 

exertion and right lower extremity cellulitis.  He was supposed to see Dr. Ceballos as an outpatient for his left shoulder and possibly left hip and his 

primary doctor had suggested he might benefit from a cortisone injection.  Due 

to his continued left hip pain and is ambulatory dysfunction, I was consult did 

as I am covering for Dr. Ceballos this weekend.  





Past medical history: Diastolic CHF, A. fib, HTN, BPH, cellulitis and lower 

extremity edema, pseudomonal pneumonia, diverticulitis.





Past surgical history: Bowel resection, osteotomy and reversal, multiple left 

digit partial amputations.





MEDICATIONS: Scheduled


Apixaban (Eliquis), 5 MG PO BID - on hold


Cholecalciferol (Vitamin D3), 400 INTUNIT PO HS


Finasteride (Finasteride), 5 MG PO QAM


Furosemide (Lasix), 2 TAB PO QAM


Furosemide (Lasix), 40 MG PO QPM


Glucosamine Sulfate (Glucosamine), 1,000 MG PO BID


Lisinopril (Zestril), 10 MG PO BID - on hold


Metoprolol Succinate (Metoprolol Succinate ER), 0.5 TAB PO BID - on hold


Ocuvite Preservision (Ocuvite Preservision), 1 TAB PO BID


Pantoprazole (Protonix), 40 MG PO DAILY


Potassium Ext Rel (Klor-Con), 20 MEQ PO DAILY


Senna/Docusate Sod (Senokot S), 1 TAB PO BID


Tamsulosin Hcl (Flomax), 0.4 MG PO HS


Ceftriaxone 1 g every 24 hours IV





Scheduled PRN


Albuterol Sulf (Proventil 0.083% 2.5MG/3ML), 2.5 MG INH QID PRN for AS NEEDED


Tramadol (Ultram), 50 MG PO Q6H PRN for Pain.





ALLERGIES: No known drug allergies.





FAMILY HISTORY: Non-contributory.





SOCIAL HISTORY: Denies smoking or chemical dependency.  He admits to half a 

beer per week.  He is  and lives with family.





REVIEW OF SYSTEMS:  A 14-point review of systems is noted from his admission H&

P and noted in the shared medical record.





PHYSICAL EXAM:  Patient is in no acute distress breathing easily at 16-20 

breaths per minute.  The patient  laying comfortably in his hospital bed.  They 

have an appropriate mood and affect.  They weigh 86.4 kg and are 170.2 cm tall.


VITALS: Temperature 36.3, pulse 58-67, blood pressure 102-123/63-65.





Focusing on their left lower extremity, his sensation to light touch is intact 

distally, he has brisk cap refill, he is able to wiggle his toes and ankle up 

and down as well as his knee and hip.  He has no pain with logroll of the hip 

or with range of motion of the hip.  He is exquisitely tender palpation over 

his greater trochanter.  His calf is soft and nontender.  He does have swelling 

of bilateral lower extremities.





RADIOGRAPHS:  AP and lateral of his left hip in the [facility] system from 

earlier this month, show mild degenerative changes.  No evidence of fracture or 

dislocation.





IMPRESSION:  Left hip trochanteric bursitis, mild left hip osteoarthritis.





PLAN: After a lengthy discussion with the patient today regarding my above 

clinical findings, as well as reviewing his radiographs, he will be treated 

conservatively with ice, weightbearing as tolerated.  In addition, I he would 

benefit from a cortisone injection.  This will be performed later this weekend 

once the medication is available at bedside. He may follow-up with Dr. Ceballos 

as an outpatient once he is discharged.   The patient understood all my 

instructions and explanation; all their questions were satisfactorily addressed.





Thank you for allowing me to participate in this patient's care.

## 2018-02-17 NOTE — HOSPITALIST PROGRESS NOTE
Hospitalist Progress Note


Date of Service


Feb 17, 2018.





Subjective


Pt evaluation today including:  conversation w/ patient, conversation w/ 

consultant (Pulmonology)


Voiding:  no voiding problems


Pt was OOB ambulating to BR and feels a little stronger. Is diuresing but no 

much today although not all urine recorded I believe. Tele with improved rates 

in the 60s in Afib.





   Respiratory:  + dyspnea on exertion


   Cardiovascular:  No chest pain


   Abdomen:  No pain, No constipation


   All Other Systems:  Reviewed and Negative





Objective


Vital Signs











  Date Time  Temp Pulse Resp B/P (MAP) Pulse Ox O2 Delivery O2 Flow Rate FiO2


 


2/17/18 19:38 36.4 60 16 106/64 (78) 91 Room Air  


 


2/17/18 16:00      Room Air  


 


2/17/18 15:22 36.5 57 18 116/76 (89) 92 Room Air  


 


2/17/18 15:21 37.1 89 16 112/71 (85) 92 Room Air  


 


2/17/18 13:20 36.4 65 20 105/57 (73) 91 Room Air  


 


2/17/18 12:00      Room Air  


 


2/17/18 08:00      Room Air  


 


2/17/18 07:40 36.3 67 20 102/65 (77) 93 Room Air  


 


2/17/18 04:00     96 Nasal Cannula 2.0 


 


2/17/18 03:45 36.3 58 17 123/63 (83) 95 Nasal Cannula 3.0 


 


2/17/18 00:00     96 Nasal Cannula 2.0 


 


2/16/18 23:35 36.5 60 16 115/64 (81) 96 Nasal Cannula 2.0 











Physical Exam


General Appearance:  WD/WN, no apparent distress


Eyes:  normal inspection, sclerae normal


Neck:  trachea midline


Respiratory/Chest:  no respiratory distress, no accessory muscle use, + 

decreased breath sounds (at left lower and middle lung fields, and dec at right 

base)


Cardiovascular:  + irregularly irregular (with normal rate), + pertinent finding

 (3+ pitting edema LEs to thighs bilat)


Abdomen:  normal bowel sounds, non tender, soft


Extremities:  no calf tenderness, + swelling (as above)


Neurologic/Psychiatric:  alert, normal mood/affect


Skin:  warm/dry, + rash (right leg anterior tibia with very mild erythema and 

warmth)





Laboratory Results





Last 24 Hours








Test


  2/17/18


05:35 2/17/18


07:53


 


White Blood Count 6.05 K/uL  


 


Red Blood Count 4.78 M/uL  


 


Hemoglobin 14.9 g/dL  


 


Hematocrit 43.8 %  


 


Mean Corpuscular Volume 91.6 fL  


 


Mean Corpuscular Hemoglobin 31.2 pg  


 


Mean Corpuscular Hemoglobin


Concent 34.0 g/dl 


  


 


 


RDW Standard Deviation 55.6 fL  


 


RDW Coefficient of Variation 16.6 %  


 


Platelet Count 115 K/uL  


 


Mean Platelet Volume 10.5 fL  


 


Sodium Level 132 mmol/L  


 


Potassium Level  mmol/L  4.0 mmol/L 


 


Chloride Level 93 mmol/L  


 


Carbon Dioxide Level 34 mmol/L  


 


Anion Gap 5.0 mmol/L  


 


Blood Urea Nitrogen 35 mg/dl  


 


Creatinine 1.30 mg/dl  


 


Est Creatinine Clear Calc


Drug Dose 42.0 ml/min 


  


 


 


Estimated GFR (


American) 56.9 


  


 


 


Estimated GFR (Non-


American 49.1 


  


 


 


BUN/Creatinine Ratio 26.6  


 


Random Glucose 86 mg/dl  


 


Calcium Level 10.0 mg/dl  


 


Magnesium Level  mg/dl  2.1 mg/dl 


 


Total Bilirubin 1.5 mg/dl  


 


Direct Bilirubin  mg/dl  0.7 mg/dl 


 


Aspartate Amino Transf


(AST/SGOT)  U/L 


  28 U/L 


 


 


Alanine Aminotransferase


(ALT/SGPT) 20 U/L 


  


 


 


Alkaline Phosphatase 92 U/L  


 


Lactate Dehydrogenase 207 U/L  


 


Total Protein 5.9 gm/dl  


 


Albumin 2.5 gm/dl  











Assessment and Plan


Patient is an 87-year-old male with a history of chronic diastolic CHF, 

permanent AF on anticoagulation with Eliquis, HTN, HPL, BPH, who presents with 

acute on chronic diastolic CHF with anasarca and enlarging bilateral left 

greater than right pleural effusions. Also with suspected RLE cellulitis.








Acute on chronic Diastolic CHF/Bilateral pleural effusions L>R/Chronic 

pericardial effusion:- currently at a neg balance at - 3.3 L, slowly diuresing, 

still with large amount of edema/anasarca. Dopplers LEs neg for DVT


- Continue Bumex 2 mg IV QID and continue to monitor kidney function and 

electrolytes - acceptable at this time and Cr stable at 1.3


- Cardiology following - appreciate recommendations to continue diuresis


-Repeat chest x-ray 2/16 with minimal improvement of effusions


-Consult pulmonology to see about thoracentesis-holding the Eliquis for 

thoracentesis on Sunday


-Cardiology recommends reassessment of pericardial effusion after diuresis 

perhaps early next week


-Cardiology also recommends readdressing adding his lisinopril and beta-blocker 

back on at some point after diuresis








A Flutter/A Fib: Rate Controlled, was bradycardic, now improved with holding 

Toprol


- continue to hold Toprol XL 


-will resume Eliquis 5 mg BID after thoracentesis





RLE Cellulitis: slightly improved, likely some component of chronic venous 

stasis dermatitis


- continue Rocephin 1 g IV daily and continue to observe





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


- Holding Lisinopril 


-follow PRP





Chronic Progressive Hip Pain: exam c/w left trochanteric bursitis


-Consult orthopedics due to ambulatory dysfunction and bursitis


-Ortho plans on doing trochanteric bursa injection later this weekend





Code Status: FULL RESUSCITATION





DVT Prophylaxis: Eliquis but on hold currently for planned thoracentesis





Disposition:


- PT recommending SNF but not medically ready yet, remain on telemetry given 

previous bradycardia

## 2018-02-18 VITALS
TEMPERATURE: 98.42 F | HEART RATE: 65 BPM | OXYGEN SATURATION: 90 % | SYSTOLIC BLOOD PRESSURE: 104 MMHG | DIASTOLIC BLOOD PRESSURE: 60 MMHG

## 2018-02-18 VITALS
HEART RATE: 60 BPM | DIASTOLIC BLOOD PRESSURE: 65 MMHG | OXYGEN SATURATION: 93 % | TEMPERATURE: 97.88 F | SYSTOLIC BLOOD PRESSURE: 105 MMHG

## 2018-02-18 VITALS
SYSTOLIC BLOOD PRESSURE: 112 MMHG | OXYGEN SATURATION: 90 % | TEMPERATURE: 97.52 F | HEART RATE: 59 BPM | DIASTOLIC BLOOD PRESSURE: 62 MMHG

## 2018-02-18 VITALS
TEMPERATURE: 97.52 F | SYSTOLIC BLOOD PRESSURE: 116 MMHG | OXYGEN SATURATION: 92 % | HEART RATE: 63 BPM | DIASTOLIC BLOOD PRESSURE: 62 MMHG

## 2018-02-18 VITALS
SYSTOLIC BLOOD PRESSURE: 118 MMHG | DIASTOLIC BLOOD PRESSURE: 62 MMHG | OXYGEN SATURATION: 92 % | TEMPERATURE: 97.52 F | HEART RATE: 58 BPM

## 2018-02-18 VITALS
SYSTOLIC BLOOD PRESSURE: 112 MMHG | OXYGEN SATURATION: 91 % | HEART RATE: 73 BPM | DIASTOLIC BLOOD PRESSURE: 64 MMHG | TEMPERATURE: 97.52 F

## 2018-02-18 LAB
BUN SERPL-MCNC: 34 MG/DL (ref 7–18)
CALCIUM SERPL-MCNC: 10 MG/DL (ref 8.5–10.1)
CO2 SERPL-SCNC: 31 MMOL/L (ref 21–32)
CREAT SERPL-MCNC: 1.09 MG/DL (ref 0.6–1.4)
EOSINOPHIL NFR BLD AUTO: 109 K/UL (ref 130–400)
GLUCOSE FLD-MCNC: 112 MG/DL
GLUCOSE SERPL-MCNC: 99 MG/DL (ref 70–99)
HCT VFR BLD CALC: 44.5 % (ref 42–52)
HGB BLD-MCNC: 15.2 G/DL (ref 14–18)
MCH RBC QN AUTO: 31.1 PG (ref 25–34)
MCHC RBC AUTO-ENTMCNC: 34.2 G/DL (ref 32–36)
MCV RBC AUTO: 91.2 FL (ref 80–100)
PMV BLD AUTO: 10.2 FL (ref 7.4–10.4)
POTASSIUM SERPL-SCNC: 3.7 MMOL/L (ref 3.5–5.1)
PROT FLD-MCNC: 2.1 G/DL
RED CELL DISTRIBUTION WIDTH CV: 16.6 % (ref 11.5–14.5)
RED CELL DISTRIBUTION WIDTH SD: 55.4 FL (ref 36.4–46.3)
SODIUM SERPL-SCNC: 131 MMOL/L (ref 136–145)
WBC # BLD AUTO: 5.83 K/UL (ref 4.8–10.8)

## 2018-02-18 PROCEDURE — 3E0U33Z INTRODUCTION OF ANTI-INFLAMMATORY INTO JOINTS, PERCUTANEOUS APPROACH: ICD-10-PCS | Performed by: ORTHOPAEDIC SURGERY

## 2018-02-18 PROCEDURE — 0W9B3ZX DRAINAGE OF LEFT PLEURAL CAVITY, PERCUTANEOUS APPROACH, DIAGNOSTIC: ICD-10-PCS | Performed by: INTERNAL MEDICINE

## 2018-02-18 RX ADMIN — FINASTERIDE SCH MG: 5 TABLET, FILM COATED ORAL at 08:46

## 2018-02-18 RX ADMIN — STANDARDIZED SENNA CONCENTRATE AND DOCUSATE SODIUM SCH TAB: 8.6; 5 TABLET ORAL at 08:45

## 2018-02-18 RX ADMIN — BUMETANIDE SCH MLS/MIN: 0.25 INJECTION INTRAMUSCULAR; INTRAVENOUS at 08:45

## 2018-02-18 RX ADMIN — BUMETANIDE SCH MLS/MIN: 0.25 INJECTION INTRAMUSCULAR; INTRAVENOUS at 21:03

## 2018-02-18 RX ADMIN — STANDARDIZED SENNA CONCENTRATE AND DOCUSATE SODIUM SCH TAB: 8.6; 5 TABLET ORAL at 21:02

## 2018-02-18 RX ADMIN — BUMETANIDE SCH MLS/MIN: 0.25 INJECTION INTRAMUSCULAR; INTRAVENOUS at 17:18

## 2018-02-18 RX ADMIN — POTASSIUM CHLORIDE SCH MEQ: 1500 TABLET, EXTENDED RELEASE ORAL at 08:45

## 2018-02-18 RX ADMIN — BUMETANIDE SCH MLS/MIN: 0.25 INJECTION INTRAMUSCULAR; INTRAVENOUS at 13:58

## 2018-02-18 RX ADMIN — APIXABAN SCH MG: 2.5 TABLET, FILM COATED ORAL at 21:28

## 2018-02-18 RX ADMIN — PANTOPRAZOLE SCH MG: 40 TABLET, DELAYED RELEASE ORAL at 08:45

## 2018-02-18 RX ADMIN — TAMSULOSIN HYDROCHLORIDE SCH MG: 0.4 CAPSULE ORAL at 21:03

## 2018-02-18 RX ADMIN — CEFTRIAXONE SODIUM SCH MLS/HR: 1 INJECTION, POWDER, FOR SOLUTION INTRAVENOUS at 14:00

## 2018-02-18 NOTE — PROCEDURE NOTE
Procedure Note


Date of Service


Feb 18, 2018.





Procedure Note


Procedures: Left sided Thoracentesis


Consent: obtained via the patient and placed into the chart


Pre-Procedural Dx: Bilateral Pleural Effusions


Post-Procedural Dx: Bilateral Pleural Effusions





Analgesia:


   8cc of 1% Liquid Lidocaine





Procedure:


The patient was placed in an upright position and thoracic US was used to 

select a spot for the procedure.  A spot along the posterior axillary line was 

marked in the 7th intercostal space.  The patient was then draped and prepped 

in a sterile fashion.  A modified Seldinger technique was then used for 

catheter placement.  Flowing this approximately 1500cc of tan pleural  fluid 

was removed.  The patient was then cleaned and placed at a 60 degree angle in 

the bed were the US was used to evaluate for possible pneumothorax.  The US 

showed good lung sliding and starry night sign.  





EBL: none





Complications:  none

## 2018-02-18 NOTE — HOSPITALIST PROGRESS NOTE
Hospitalist Progress Note


Date of Service


Feb 18, 2018.





Subjective


Pt evaluation today including:  conversation w/ patient, conversation w/ 

consultant (Pulmonology)


Pt had thoracentesis on left today for 1500 mL and feels much improved with his 

breathing. Also had left hip trochanteric bursa injected and that feels better 

too. No CP, feels perhaps his legs are a little stronger. Diuresis continues 

but is slow.


Tele with Afib with rates in the 50s-70s





   All Other Systems:  Reviewed and Negative





Objective


Vital Signs











  Date Time  Temp Pulse Resp B/P (MAP) Pulse Ox O2 Delivery O2 Flow Rate FiO2


 


2/18/18 19:24 36.4 63 20 116/62 (80) 92 Room Air  


 


2/18/18 16:00      Room Air  


 


2/18/18 15:28 36.6 60 18 105/65 (78) 93 Room Air  


 


2/18/18 12:00      Room Air  


 


2/18/18 10:46 36.4 59 20 112/62 (79) 90 Room Air  


 


2/18/18 08:00      Room Air  


 


2/18/18 07:41 36.4 73 20 112/64 (80) 91 Room Air  


 


2/18/18 04:00      Room Air  


 


2/18/18 03:35 36.9 65 19 104/60 (75) 90 Room Air  


 


2/18/18 00:28 36.4 58 17 118/62 (80) 92 Room Air  


 


2/17/18 23:59      Room Air  











Physical Exam


General Appearance:  WD/WN, no apparent distress


Eyes:  normal inspection, sclerae normal


ENT:  hearing grossly normal


Neck:  trachea midline


Respiratory/Chest:  no respiratory distress, no accessory muscle use, + 

decreased breath sounds (at bases but much improved compared to previous)


Cardiovascular:  + irregularly irregular (w/ normal rate), + pertinent finding (

3+ pitting edema of LEs to thighs bilat)


Abdomen:  normal bowel sounds, non tender, soft, no organomegaly


Extremities:  no calf tenderness, + swelling (as above)


Neurologic/Psychiatric:  alert, normal mood/affect


Skin:  warm/dry, + pertinent finding (very mild residual erythema right 

anterior tbia)





Laboratory Results





Last 24 Hours








Test


  2/18/18


06:01 2/18/18


09:00


 


White Blood Count 5.83 K/uL  


 


Red Blood Count 4.88 M/uL  


 


Hemoglobin 15.2 g/dL  


 


Hematocrit 44.5 %  


 


Mean Corpuscular Volume 91.2 fL  


 


Mean Corpuscular Hemoglobin 31.1 pg  


 


Mean Corpuscular Hemoglobin


Concent 34.2 g/dl 


  


 


 


RDW Standard Deviation 55.4 fL  


 


RDW Coefficient of Variation 16.6 %  


 


Platelet Count 109 K/uL  


 


Mean Platelet Volume 10.2 fL  


 


Sodium Level 131 mmol/L  


 


Potassium Level 3.7 mmol/L  


 


Chloride Level 92 mmol/L  


 


Carbon Dioxide Level 31 mmol/L  


 


Anion Gap 8.0 mmol/L  


 


Blood Urea Nitrogen 34 mg/dl  


 


Creatinine 1.09 mg/dl  


 


Est Creatinine Clear Calc


Drug Dose 50.1 ml/min 


  


 


 


Estimated GFR (


American) 70.4 


  


 


 


Estimated GFR (Non-


American 60.7 


  


 


 


BUN/Creatinine Ratio 31.0  


 


Random Glucose 99 mg/dl  


 


Calcium Level 10.0 mg/dl  


 


Magnesium Level 2.2 mg/dl  


 


Pleural Fluid Source  LEFT LUNG 


 


Pleural Fluid Color  YELLOW 


 


Pleural Fluid Appearance  HAZY 


 


Pleural Fluid WBC  323 /uL 


 


Pleural Fluid RBC  < 3000 /uL 


 


Pleural Fluid pH  7.45 


 


Pleural Fluid Polynuclear


WBCs % 


  22.2 % 


 


 


Pleural Fluid Mononuclear


WBCs % 


  77.8 % 


 


 


Pleural Fluid Total Protein  2.1 g/dl 


 


Pleural Fluid LDH  61 IU 


 


Pleural Fluid Glucose  112 mg/dl 


 


Pleural Fluid Amylase  37 U/L 











Assessment and Plan


Patient is an 87-year-old male with a history of chronic diastolic CHF, 

permanent AF on anticoagulation with Eliquis, HTN, HPL, BPH, who presents with 

acute on chronic diastolic CHF with anasarca and enlarging bilateral left 

greater than right pleural effusions. Also with suspected RLE cellulitis.








Acute on chronic Diastolic CHF/Bilateral pleural effusions L>R/Chronic 

pericardial effusion:- currently at a neg balance at - 5.9 L, continues to 

diurese and is feeling better, still with large amount of edema/anasarca. 

Dopplers LEs neg for DVT


- Continue Bumex 2 mg IV QID and continue to monitor kidney function and 

electrolytes - acceptable at this time and Cr stable at 1.09


- Cardiology following - appreciate recommendations to continue diuresis


-Repeat chest x-ray 2/16 with minimal improvement of effusions--> had 

thoracentesis 2/18 with 1500 mL pulled off-transudative


-f/u pleural fluid culture and cytology 


-Consult pulmonology appreciated


-ok to resume Eliquis as per Pulm


-Cardiology recommends reassessment of pericardial effusion after diuresis 

perhaps early this week


-Cardiology also recommends readdressing adding his lisinopril and beta-blocker 

back on at some point after diuresis-remains chinedu at times


-continue on telemetry








A Flutter/A Fib: Rate Controlled, was bradycardic, now improved with holding 

Toprol but still 50-60s mostly


- continue to hold Toprol XL 


- resume Eliquis 5 mg BID 





RLE Cellulitis: much improved, likely some component of chronic venous stasis 

dermatitis


- continue Rocephin 1 g IV daily and continue to observe





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


- will resume Lisinopril after more diuresis takes place


-follow PRP





Chronic Progressive Hip Pain: exam c/w left trochanteric bursitis


-Consult orthopedics due to ambulatory dysfunction and bursitis


-Ortho performed trochanteric bursa steroid injection and is improved





Code Status: FULL RESUSCITATION





DVT Prophylaxis: Eliquis 





Disposition:


- PT recommending SNF but not medically ready yet, remain on telemetry given 

bradycardia

## 2018-02-18 NOTE — ORTHOPEDIC PROGRESS NOTE
Orthopedic Progress Note


Date of Service


Feb 18, 2018.





Subjective


Post OP Day:  HD #4


Additional Notes:


Feeling better, just had thoracocentesis.





Objective


calves soft nontender, N/V intact, A&O x3, toes mobile


LLE: + TTP greater trochanter.  Hip ROM unchanged, pain-free.











  Date Time  Temp Pulse Resp B/P (MAP) Pulse Ox O2 Delivery O2 Flow Rate FiO2


 


2/18/18 08:00      Room Air  


 


2/18/18 07:41 36.4 73 20 112/64 (80) 91 Room Air  


 


2/18/18 04:00      Room Air  


 


2/18/18 03:35 36.9 65 19 104/60 (75) 90 Room Air  


 


2/18/18 00:28 36.4 58 17 118/62 (80) 92 Room Air  


 


2/17/18 23:59      Room Air  


 


2/17/18 20:00      Room Air  


 


2/17/18 19:38 36.4 60 16 106/64 (78) 91 Room Air  


 


2/17/18 16:00      Room Air  


 


2/17/18 15:22 36.5 57 18 116/76 (89) 92 Room Air  


 


2/17/18 15:21 37.1 89 16 112/71 (85) 92 Room Air  


 


2/17/18 13:20 36.4 65 20 105/57 (73) 91 Room Air  


 


2/17/18 12:00      Room Air  








Laboratory Results 24 Hours:











Test


  2/18/18


06:01


 


Hematocrit 44.5 % 


 


Hemoglobin 15.2 g/dL 











Assessment & Plan


Assessment:


Left hip trochanteric bursitis.


Plan:


Continue conservative treatment:


Ice, WBAT, continue pain control.


Offered cortisone injection.  The risks of the injection were discussed include 

but not limited to: Infection, bleeding, nerve damage, and permanent skin 

changes at the site of the injection.  The patient wished to proceed with the 

injection.  They will keep track of how much pain relief they obtains and for 

how long.  They will ice tonight and over the next several days and understands 

that they may be more painful due to a steroid flare.  





The patient will follow-up with Dr. Ceballos as an outpatient after discharge 

from the hospital.  Please re-call Ortho if there are any other issues during 

his hospitalization.





PROCEDURE:  After obtaining verbal consent and following a time-out indicating 

the left hip to be injected with cortisone into the trochanteric bursae.  The 

point of maximal tenderness about the greater trochanter was identified and 

marked.  The area was prepped with Betadine, followed by an alcohol wipe.  Then

, a mixture of 2 mL of 1% lidocaine plain plus 2 mL of 0.5% Bupivacaine plain 

plus 1 mL of 40 mg of Depo-Medrol were easily injected into the right 

trochanteric bursae.  The patient tolerated this well and will follow all of my 

above instructions.

## 2018-02-19 VITALS
DIASTOLIC BLOOD PRESSURE: 73 MMHG | HEART RATE: 59 BPM | TEMPERATURE: 97.7 F | SYSTOLIC BLOOD PRESSURE: 117 MMHG | OXYGEN SATURATION: 94 %

## 2018-02-19 VITALS
HEART RATE: 63 BPM | SYSTOLIC BLOOD PRESSURE: 105 MMHG | OXYGEN SATURATION: 94 % | DIASTOLIC BLOOD PRESSURE: 65 MMHG | TEMPERATURE: 98.06 F

## 2018-02-19 VITALS
SYSTOLIC BLOOD PRESSURE: 93 MMHG | HEART RATE: 59 BPM | TEMPERATURE: 97.52 F | DIASTOLIC BLOOD PRESSURE: 55 MMHG | OXYGEN SATURATION: 92 %

## 2018-02-19 VITALS
SYSTOLIC BLOOD PRESSURE: 119 MMHG | DIASTOLIC BLOOD PRESSURE: 66 MMHG | OXYGEN SATURATION: 93 % | HEART RATE: 59 BPM | TEMPERATURE: 97.7 F

## 2018-02-19 VITALS
SYSTOLIC BLOOD PRESSURE: 114 MMHG | DIASTOLIC BLOOD PRESSURE: 67 MMHG | TEMPERATURE: 98.06 F | OXYGEN SATURATION: 94 % | HEART RATE: 60 BPM

## 2018-02-19 VITALS
SYSTOLIC BLOOD PRESSURE: 113 MMHG | DIASTOLIC BLOOD PRESSURE: 63 MMHG | OXYGEN SATURATION: 95 % | HEART RATE: 58 BPM | TEMPERATURE: 97.52 F

## 2018-02-19 VITALS
SYSTOLIC BLOOD PRESSURE: 112 MMHG | DIASTOLIC BLOOD PRESSURE: 65 MMHG | TEMPERATURE: 97.7 F | OXYGEN SATURATION: 94 % | HEART RATE: 58 BPM

## 2018-02-19 LAB
BASOPHILS # BLD: 0.03 K/UL (ref 0–0.2)
BASOPHILS NFR BLD: 0.5 %
BUN SERPL-MCNC: 31 MG/DL (ref 7–18)
CALCIUM SERPL-MCNC: 10.2 MG/DL (ref 8.5–10.1)
CO2 SERPL-SCNC: 31 MMOL/L (ref 21–32)
CREAT SERPL-MCNC: 1.27 MG/DL (ref 0.6–1.4)
EOS ABS #: 0.04 K/UL (ref 0–0.5)
EOSINOPHIL NFR BLD AUTO: 97 K/UL (ref 130–400)
GLUCOSE SERPL-MCNC: 140 MG/DL (ref 70–99)
HCT VFR BLD CALC: 46.8 % (ref 42–52)
HGB BLD-MCNC: 16 G/DL (ref 14–18)
IG#: 0.02 K/UL (ref 0–0.02)
IMM GRANULOCYTES NFR BLD AUTO: 17 %
LYMPHOCYTES # BLD: 1.12 K/UL (ref 1.2–3.4)
MCH RBC QN AUTO: 31.5 PG (ref 25–34)
MCHC RBC AUTO-ENTMCNC: 34.2 G/DL (ref 32–36)
MCV RBC AUTO: 92.1 FL (ref 80–100)
MONO ABS #: 0.75 K/UL (ref 0.11–0.59)
MONOCYTES NFR BLD: 11.4 %
NEUT ABS #: 4.63 K/UL (ref 1.4–6.5)
NEUTROPHILS # BLD AUTO: 0.6 %
NEUTROPHILS NFR BLD AUTO: 70.2 %
PMV BLD AUTO: 10.6 FL (ref 7.4–10.4)
POTASSIUM SERPL-SCNC: 4.2 MMOL/L (ref 3.5–5.1)
RED CELL DISTRIBUTION WIDTH CV: 16.6 % (ref 11.5–14.5)
RED CELL DISTRIBUTION WIDTH SD: 55.8 FL (ref 36.4–46.3)
SODIUM SERPL-SCNC: 130 MMOL/L (ref 136–145)
WBC # BLD AUTO: 6.59 K/UL (ref 4.8–10.8)

## 2018-02-19 RX ADMIN — BUMETANIDE SCH MLS/MIN: 0.25 INJECTION INTRAMUSCULAR; INTRAVENOUS at 07:45

## 2018-02-19 RX ADMIN — APIXABAN SCH MG: 2.5 TABLET, FILM COATED ORAL at 21:27

## 2018-02-19 RX ADMIN — STANDARDIZED SENNA CONCENTRATE AND DOCUSATE SODIUM SCH TAB: 8.6; 5 TABLET ORAL at 21:26

## 2018-02-19 RX ADMIN — BUMETANIDE SCH MLS/MIN: 0.25 INJECTION INTRAMUSCULAR; INTRAVENOUS at 16:38

## 2018-02-19 RX ADMIN — POTASSIUM CHLORIDE SCH MEQ: 1500 TABLET, EXTENDED RELEASE ORAL at 07:44

## 2018-02-19 RX ADMIN — TAMSULOSIN HYDROCHLORIDE SCH MG: 0.4 CAPSULE ORAL at 21:26

## 2018-02-19 RX ADMIN — PANTOPRAZOLE SCH MG: 40 TABLET, DELAYED RELEASE ORAL at 07:44

## 2018-02-19 RX ADMIN — CEFTRIAXONE SODIUM SCH MLS/HR: 1 INJECTION, POWDER, FOR SOLUTION INTRAVENOUS at 14:38

## 2018-02-19 RX ADMIN — FINASTERIDE SCH MG: 5 TABLET, FILM COATED ORAL at 07:44

## 2018-02-19 RX ADMIN — STANDARDIZED SENNA CONCENTRATE AND DOCUSATE SODIUM SCH TAB: 8.6; 5 TABLET ORAL at 07:45

## 2018-02-19 RX ADMIN — APIXABAN SCH MG: 2.5 TABLET, FILM COATED ORAL at 07:45

## 2018-02-19 NOTE — ECHOCARDIOGRAM REPORT
*NOTICE TO RECEIVING PARTY AGENCY**  This information is strictly Confidential and protected under 
[place] law.  [place] law prohibits you from making any further disclosure of this 
information unless further disclosure is expressly permitted by the written consent of the person to 
whom it pertains or is authorized by law.  A general authorization for the release of medical or 
other information is not sufficient for this purpose.  Hospital accepts no responsibility if the 
information is made available to any other person, INCLUDING THE PATIENT.



Interpretation Summary

  *  Name: MARIE RENO  Study Date: 2018 11:08 AM  BP: 93/55 mmHg

  *  MRN: [id number]  Patient Location: C.2T\S\S230\S\2  HR: 68

  *  : 1931 (M/d/yyyy)  Gender: Male  Height: 67 in

  *  Age: 87 yrs  Ethnicity: [place]  Weight: 180 lb

  *  Ordering Physician: Kocher, Christopher

  *  Referring Physician: Self, Referred

  *  Performed By: CASSIA Guidry [facility]

  *  Accession# [id number]  Account# [id number]

  *  Reason For Study: CHF

  *  BSA: 1.9 m2

  *  -- Conclusions --

  *  1. Small LV cavity size.  Severe concentric LVH.

  *  2. Normal LV systolic function.  LVEF 65-70%. No regional wall motion abnormalities.

  *  3. Normal RV size and function.

  *  4. Mild-to-moderate aortic regurgitation.

  *  5. Grade II diastolic dysfunction

  *  6. Borderline PH.  Estimated PASP 35-40 mmHg.  Dilated IVC, estimated RA 15 mmHg.

  *  7. Large circumferential pericardial effusion without evidence of tamponade.

  *  8. Moderate pleural effusion.

  *  9. Compared with prior study on 2017:  Pericardial effusion has slightly increased in 
size.

Procedure Details

  *  A complete two-dimensional transthoracic echocardiogram was performed (2D, M-mode, Doppler and 
color flow Doppler).

Left Ventricle

  *  The left ventricular cavity is small.

  *  There is severe concentric left ventricular hypertrophy.

  *  Ejection Fraction = 65-70%.

Right Ventricle

  *  The right ventricle is grossly normal size.

  *  The right ventricular systolic function is normal as assessed by tricuspid annular plane 
systolic excursion (TAPSE) (normal >1.5 cm).

Atria

  *  The left atrium is moderately dilated.

  *  The right atrium is moderately dilated.

  *  Doppler suggests left to right interatrial shunt.

Mitral Valve

  *  The mitral valve is grossly normal.

  *  There is no mitral valve stenosis.

  *  There is trace mitral regurgitation.

Tricuspid Valve

  *  There is trace tricuspid regurgitation.

Aortic Valve

  *  The aortic valve opens well.

  *  The aortic valve is trileaflet.

  *  No hemodynamically significant valvular aortic stenosis.

  *  Mild to moderate aortic regurgitation.

Pulmonic Valve

  *  The pulmonary valve is inadequately visualized, but the Doppler data is adequate for 
interpretation.

  *  Pulmonic stenosis is absent.

  *  Trace pulmonic valvular regurgitation.

Great Vessels

  *  The aortic root and proximal ascending aorta are normal sized.

Pericardium/Pleural

  *  Large pericardial effusion.

  *  There is no diastolic compression of the right ventricle to suggest cardiac tamponade.

  *  There are no echocardiographic indications of cardiac tamponade.

  *  A circumferential pericardial effusion is noted.

  *  A hematoma/mass is seen in the pericardial space.

  *  Moderate size left pleural effusion.

Great Vessels

  *  Dilated inferior vena cava with reduced collapsability with sniff indicates an elevated right 
atrial pressure of 15 mmHg

Left Ventricular Diastolic Function

  *  Diastolic dysfunction, Grade II (pseudonormalization pattern).



MMode 2D Measurements and Calculations

IVSd 1.9 cm

IVSs 2.1 cm



LVIDd 2.3 cm

LVIDs 1.5 cm

LVPWd 1.9 cm

LVPWs 3.2 cm



IVS/LVPW 0.97 

FS 36.5 %

EDV(Teich) 18.6 ml

ESV(Teich) 5.8 ml

EF(Teich) 68.8 %



EDV(cubed) 12.5 ml

ESV(cubed) 3.2 ml

EF(cubed) 74.4 %

% IVS thick 13.5 %

% LVPW thick 68.9 %





LV mass(C)d 180.0 grams

LV mass(C)dI 93.1 grams/m\S\2

LV mass(C)s 263.9 grams

LV mass(C)sI 136.5 grams/m\S\2



SV(Teich) 12.8 ml

SI(Teich) 6.6 ml/m\S\2

SV(cubed) 9.3 ml

SI(cubed) 4.8 ml/m\S\2



Ao root diam 3.8 cm

Ao root area 11.3 cm\S\2

ACS 2.0 cm

LA dimension 4.9 cm



asc Aorta Diam 3.3 cm





LA/Ao 1.3 



EDV(MOD-sp4) 62.4 ml

ESV(MOD-sp4) 19.3 ml

EF(MOD-sp4) 69.0 %



EDV(MOD-sp2) 106.4 ml

ESV(MOD-sp2) 23.7 ml

EF(MOD-sp2) 77.7 %



SV(MOD-sp4) 43.0 ml

SI(MOD-sp4) 22.2 ml/m\S\2





SV(MOD-sp2) 82.7 ml

SI(MOD-sp2) 42.8 ml/m\S\2













Doppler Measurements and Calculations

MV E max shanae 78.5 cm/sec

MV A max shanae 68.5 cm/sec



MV E/A 1.1 



MV P1/2t max shanae 144.3 cm/sec

MV P1/2t 63.7 msec

MVA(P1/2t) 3.5 cm\S\2

MV dec slope 663.2 cm/sec\S\2

MV dec time 0.22 sec



Ao V2 max 131.4 cm/sec

Ao max PG 6.9 mmHg

Ao max PG (full) 2.6 mmHg





AI max shanae 312.8 cm/sec

AI max PG 39.1 mmHg

AI dec slope 213.1 cm/sec\S\2

AI P1/2t 429.9 msec



LV V1 max PG 4.3 mmHg



LV V1 max 104.1 cm/sec



PA V2 max 72.7 cm/sec

PA max PG 2.1 mmHg





PI max shanae 206.6 cm/sec

PI max PG 17.2 mmHg

PI dec slope 177.3 cm/sec\S\2

PI P1/2t 341.2 msec



TR max shanae 249.8 cm/sec

## 2018-02-19 NOTE — HOSPITALIST PROGRESS NOTE
Hospitalist Progress Note


Date of Service


Feb 19, 2018.





Subjective


Pt evaluation today including:  conversation w/ patient


Voiding:  no voiding problems


Patient feeling good.  He is not coughing, he denies chest pain, feels his 

breathing is significantly improved since thoracentesis.  He continues to 

diurese but his creatinine is starting to bump up a little bit.  Telemetry with 

A. hemant, rate controlled in the 60s-70s.





   All Other Systems:  Reviewed and Negative





Objective


Vital Signs











  Date Time  Temp Pulse Resp B/P (MAP) Pulse Ox O2 Delivery O2 Flow Rate FiO2


 


2/19/18 07:10 36.4 59 20 93/55 (68) 92 Room Air  


 


2/19/18 04:00      Room Air  


 


2/19/18 03:54 36.5 58 22 112/65 (81) 94 Nasal Cannula 1.5 


 


2/19/18 00:09 36.7 63 16 105/65 (78) 94 Room Air  


 


2/18/18 23:59      Room Air  


 


2/18/18 20:00      Room Air  


 


2/18/18 20:00      Room Air  


 


2/18/18 19:24 36.4 63 20 116/62 (80) 92 Room Air  


 


2/18/18 16:00      Room Air  


 


2/18/18 15:28 36.6 60 18 105/65 (78) 93 Room Air  


 


2/18/18 12:00      Room Air  


 


2/18/18 10:46 36.4 59 20 112/62 (79) 90 Room Air  











Physical Exam


General Appearance:  WD/WN, no apparent distress


Eyes:  normal inspection, sclerae normal


ENT:  hearing grossly normal


Neck:  trachea midline


Respiratory/Chest:  no respiratory distress, no accessory muscle use, + 

decreased breath sounds (At the bases bilaterally, otherwise clear)


Cardiovascular:  + irregularly irregular (With normal rate)


Abdomen:  normal bowel sounds, non tender, soft


Extremities:  no calf tenderness, + swelling (3+ pitting edema to the thighs 

bilaterally)


Neurologic/Psychiatric:  alert, normal mood/affect, oriented x 3


Skin:  warm/dry, + pertinent finding (Very minimal erythema the right anterior 

tibia, much improved)





Laboratory Results





Last 24 Hours








Test


  2/19/18


08:16 2/19/18


09:05


 


White Blood Count 6.59 K/uL  


 


Red Blood Count 5.08 M/uL  


 


Hemoglobin 16.0 g/dL  


 


Hematocrit 46.8 %  


 


Mean Corpuscular Volume 92.1 fL  


 


Mean Corpuscular Hemoglobin 31.5 pg  


 


Mean Corpuscular Hemoglobin


Concent 34.2 g/dl 


  


 


 


Platelet Count 97 K/uL  


 


Mean Platelet Volume 10.6 fL  


 


Neutrophils (%) (Auto) 70.2 %  


 


Lymphocytes (%) (Auto) 17.0 %  


 


Monocytes (%) (Auto) 11.4 %  


 


Eosinophils (%) (Auto) 0.6 %  


 


Basophils (%) (Auto) 0.5 %  


 


Neutrophils # (Auto) 4.63 K/uL  


 


Lymphocytes # (Auto) 1.12 K/uL  


 


Monocytes # (Auto) 0.75 K/uL  


 


Eosinophils # (Auto) 0.04 K/uL  


 


Basophils # (Auto) 0.03 K/uL  


 


RDW Standard Deviation 55.8 fL  


 


RDW Coefficient of Variation 16.6 %  


 


Immature Granulocyte % (Auto) 0.3 %  


 


Immature Granulocyte # (Auto) 0.02 K/uL  


 


Sodium Level 130 mmol/L  


 


Potassium Level  mmol/L  4.2 mmol/L 


 


Chloride Level 91 mmol/L  


 


Carbon Dioxide Level 31 mmol/L  


 


Anion Gap 8.0 mmol/L  


 


Blood Urea Nitrogen 31 mg/dl  


 


Creatinine 1.27 mg/dl  


 


Est Creatinine Clear Calc


Drug Dose 42.0 ml/min 


  


 


 


Estimated GFR (


American) 58.5 


  


 


 


Estimated GFR (Non-


American 50.5 


  


 


 


BUN/Creatinine Ratio 24.6  


 


Random Glucose 140 mg/dl  


 


Calcium Level 10.2 mg/dl  


 


Magnesium Level  mg/dl  2.0 mg/dl 











Assessment and Plan


Patient is an 87-year-old male with a history of chronic diastolic CHF, 

permanent AF on anticoagulation with Eliquis, HTN, HPL, BPH, hypercalcemia and 

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 

on chronic diastolic CHF with anasarca and enlarging bilateral left greater 

than right pleural effusions. Also with RLE cellulitis.








Acute on chronic Diastolic CHF/Bilateral pleural effusions L>R/Chronic 

pericardial effusion:- currently at a neg balance at - 6.6 L, continues to 

diurese and is feeling better, still with large amount of edema/anasarca. 

Dopplers LEs neg for DVT.  Creatinine starting to increase today


-Decrease Bumex to 2 mg IV BID and continue to monitor kidney function and 

electrolytes 


- Cardiology following - appreciate recommendations to continue diuresis-we 

will discuss further plans with cardiology today


-Repeat chest x-ray 2/16 with minimal improvement of effusions--> had 

thoracentesis 2/18 with 1500 mL pulled off-transudative


-f/u pleural fluid culture and cytology-no growth so far and do not expect this


-Consult pulmonology appreciated


-Cardiology recommends reassessment of pericardial effusion after diuresis 

perhaps early this week


-Cardiology also recommends readdressing adding his lisinopril and beta-blocker 

back on at some point after diuresis-bradycardia has resolved-will discuss with 

cardiology today about adding beta-blocker back on today


-continue on telemetry for now, but may consider transferring to medical floor 

after restarting metoprolol








A Flutter/A Fib: Rate Controlled, was bradycardic, now improved with holding 

Toprol 


-We will discuss restarting Toprol XL as above with cardiology


- resumed Eliquis 5 mg BID previously on hold for thoracentesis





RLE Cellulitis: much improved, likely some component of chronic venous stasis 

dermatitis


- continue Rocephin 1 g IV daily for 1 more day and continue to observe





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 

aggressive diuresis


- will resume Lisinopril after more diuresis takes place


-follow PRP


-Decreasing Bumex as above





Chronic Progressive Hip Pain: exam c/w left trochanteric bursitis


-Consult orthopedics due to ambulatory dysfunction and bursitis


-Ortho performed trochanteric bursa steroid injection and is improved





Hypercalcemia/hyperparathyroidism/history of parathyroid adenoma-corrected 

calcium is 11.2 here which seems fairly stable from previous.  Has had previous 

parathyroid adenoma resection twice in the past.  It seems he is followed with 

routine labs and has been fairly stable.


-Check intact PTH, 25 OH vitamin D, and follow calcium levels


-Follow-up as an outpatient





Code Status: FULL RESUSCITATION





DVT Prophylaxis: Eliquis 





Disposition:


- PT recommending SNF but not medically ready yet

## 2018-02-19 NOTE — PULMONOLOGY PROGRESS NOTE
Pulmonary Progress Note


Date of Service


Feb 19, 2018.





Attending


Dr. Remy





Subjective


The patient improved significantly after thoracentesis done by Dr. Baxter, 

he felt much better, a symptomatically from poorly standpoint, on room air.





Objective


On 02/19/2018, the patient did not have any respiratory symptoms, his vital 

signs are stable, S1-S2 regular rate and rhythm, diminished breath sounds 

mainly at the bases, abdomen is benign, no edema.





Assessment & Plan


#1 bilateral pleural effusion, related to his history of CHF.  Underwent 

thoracentesis with 1.5 L be removed.  The patient felt extremely better.  

Optimize his cardiac regimen.  No further recommendations from pulmonary 

standpoint.  In case of recurrence of pleural effusion please do not hesitate 

to call us back for further evaluation.





We'll sign off the case.





Data


Medications:





Current Inpatient Medications








 Medications


  (Trade)  Dose


 Ordered  Sig/Rudy


 Route  Start Time


 Stop Time Status Last Admin


Dose Admin


 


 Finasteride


  (Proscar Tab)  5 mg  QAM


 PO  2/15/18 09:00


 3/17/18 08:59  2/19/18 07:44


5 MG


 


 Lisinopril


  (Zestril Tab)  10 mg  BID


 PO  2/15/18 09:00


 3/17/18 08:59 Future Hold 2/15/18 07:51


10 MG


 


 Metoprolol


 Succinate


  (Toprol Xl Tab)  12.5 mg  BID


 PO  2/15/18 09:00


 3/17/18 08:59 Future Hold  


 


 


 Pantoprazole


 Sodium


  (Protonix Tab)  40 mg  DAILY


 PO  2/15/18 09:00


 3/17/18 08:59  2/19/18 07:44


40 MG


 


 Potassium Chloride


  (Klor-Con Tab)  20 meq  DAILY


 PO  2/15/18 09:00


 3/17/18 08:59  2/19/18 07:44


20 MEQ


 


 Senna/Docusate


 Sodium


  (Senokot S Tab)  1 tab  BID


 PO  2/15/18 09:00


 3/17/18 08:59  2/19/18 07:45


1 TAB


 


 Tamsulosin HCl


  (Flomax Cap)  0.4 mg  HS


 PO  2/15/18 21:00


 3/17/18 20:59  2/18/18 21:03


0.4 MG


 


 Tramadol HCl


  (Ultram Tab)  50 mg  Q6H  PRN


 PO  2/14/18 21:30


 3/16/18 21:29  2/16/18 00:31


50 MG


 


 Apixaban


  (Eliquis Tab)  5 mg  BID


 PO  2/15/18 09:00


 3/17/18 08:59 Future hold 2/19/18 07:45


5 MG


 


 Albuterol Sulfate


  (Ventolin 0.083%


 2.5MG/3ML Neb)  2.5 mg  Q6R  PRN


 INH  2/14/18 21:30


 3/16/18 21:29   


 


 


 Acetaminophen


  (Tylenol Tab)  650 mg  Q4H  PRN


 PO  2/14/18 22:00


 3/16/18 21:59   


 


 


 Al Hydrox/Mg


 Hydrox/Simethicone


  (Maalox Max Susp)  15 ml  Q4H  PRN


 PO  2/14/18 22:00


 3/16/18 21:59   


 


 


 Magnesium


 Hydroxide


  (Milk Of


 Magnesia Susp)  30 ml  Q12H  PRN


 PO  2/14/18 22:00


 3/16/18 21:59   


 


 


 Ondansetron HCl


  (Zofran Inj)  4 mg  Q6H  PRN


 IV  2/14/18 22:00


 3/16/18 21:59   


 


 


 Morphine Sulfate


  (MoRPHine


 SULFATE INJ)  2 mg  Q30M  PRN


 IV  2/14/18 22:00


 2/28/18 21:59   


 


 


 Polyethylene


  (Miralax Powder


 Packet)  17 gm  DAILY  PRN


 PO  2/14/18 22:00


 3/16/18 21:59   


 


 


 Ceftriaxone


 Sodium 1 gm/


 Dextrose  50 ml @ 


 100 mls/hr  Q24H


 IV  2/15/18 14:30


 2/25/18 14:29  2/19/18 14:38


100 MLS/HR


 


 Bumetanide 2 mg/


 Syringe  8 ml @ 4


 mls/min  BID17


 IV  2/19/18 17:00


 3/17/18 08:59  2/19/18 16:38


4 MLS/MIN








I & O:











24-Hour Column 


 


 2/20/18





 07:59


 


Intake Total 640 ml


 


Output Total 400 ml


 


Balance 240 ml








Vital Signs:











  Date Time  Temp Pulse Resp B/P (MAP) Pulse Ox O2 Delivery O2 Flow Rate FiO2


 


2/19/18 16:00      Room Air  


 


2/19/18 15:49 36.4 58 18 113/63 (80) 95 Room Air  


 


2/19/18 12:30      Room Air  


 


2/19/18 10:36 36.7 60 20 114/67 (83) 94 Room Air  


 


2/19/18 08:00      Room Air  


 


2/19/18 07:10 36.4 59 20 93/55 (68) 92 Room Air  


 


2/19/18 04:00      Room Air  


 


2/19/18 03:54 36.5 58 22 112/65 (81) 94 Nasal Cannula 1.5 


 


2/19/18 00:09 36.7 63 16 105/65 (78) 94 Room Air  


 


2/18/18 23:59      Room Air  


 


2/18/18 20:00      Room Air  


 


2/18/18 20:00      Room Air  


 


2/18/18 19:24 36.4 63 20 116/62 (80) 92 Room Air  








Laboratory Results:





Last 24 Hours








Test


  2/19/18


08:16 2/19/18


09:05 2/19/18


10:01


 


White Blood Count 6.59 K/uL   


 


Red Blood Count 5.08 M/uL   


 


Hemoglobin 16.0 g/dL   


 


Hematocrit 46.8 %   


 


Mean Corpuscular Volume 92.1 fL   


 


Mean Corpuscular Hemoglobin 31.5 pg   


 


Mean Corpuscular Hemoglobin


Concent 34.2 g/dl 


  


  


 


 


Platelet Count 97 K/uL   


 


Mean Platelet Volume 10.6 fL   


 


Neutrophils (%) (Auto) 70.2 %   


 


Lymphocytes (%) (Auto) 17.0 %   


 


Monocytes (%) (Auto) 11.4 %   


 


Eosinophils (%) (Auto) 0.6 %   


 


Basophils (%) (Auto) 0.5 %   


 


Neutrophils # (Auto) 4.63 K/uL   


 


Lymphocytes # (Auto) 1.12 K/uL   


 


Monocytes # (Auto) 0.75 K/uL   


 


Eosinophils # (Auto) 0.04 K/uL   


 


Basophils # (Auto) 0.03 K/uL   


 


RDW Standard Deviation 55.8 fL   


 


RDW Coefficient of Variation 16.6 %   


 


Immature Granulocyte % (Auto) 0.3 %   


 


Immature Granulocyte # (Auto) 0.02 K/uL   


 


Sodium Level 130 mmol/L   


 


Potassium Level  mmol/L  4.2 mmol/L  


 


Chloride Level 91 mmol/L   


 


Carbon Dioxide Level 31 mmol/L   


 


Anion Gap 8.0 mmol/L   


 


Blood Urea Nitrogen 31 mg/dl   


 


Creatinine 1.27 mg/dl   


 


Est Creatinine Clear Calc


Drug Dose 42.0 ml/min 


  


  


 


 


Estimated GFR (


American) 58.5 


  


  


 


 


Estimated GFR (Non-


American 50.5 


  


  


 


 


BUN/Creatinine Ratio 24.6   


 


Random Glucose 140 mg/dl   


 


Calcium Level 10.2 mg/dl   


 


Magnesium Level  mg/dl  2.0 mg/dl  


 


25-Hydroxy Vitamin D Total   30.0 ng/ml 


 


Parathyroid Hormone (Intact)   160.5 pg/mL

## 2018-02-19 NOTE — CARDIOLOGY FOLLOW-UP
Subjective


Date of Service:


Feb 19, 2018.


Pt evaluation today including:  conversation w/ patient, physical exam, chart 

review, lab review, review of studies, review of inpatient medication list, 

conversation w/ attending


History of Present Illness


This morning the patient claims to be feeling well.  He was up in a chair and 

reports a good appetite.  He states that his breathing is much better.  He has 

been ambulatory to the restroom with some dyspnea.  This also appears to be 

improved.  His hip pain is also better.





Social History


Smoking Status:  Never Smoker


History of Alcohol Use:  No





Review of Systems


Respiratory:  + dyspnea on exertion


Cardiac:  No chest pain


Per HPI.





Objective





Vital Signs Past 12 Hours








  Date Time  Temp Pulse Resp B/P (MAP) Pulse Ox O2 Delivery O2 Flow Rate FiO2


 


2/19/18 10:36 36.7 60 20 114/67 (83) 94 Room Air  


 


2/19/18 08:00      Room Air  


 


2/19/18 07:10 36.4 59 20 93/55 (68) 92 Room Air  


 


2/19/18 04:00      Room Air  


 


2/19/18 03:54 36.5 58 22 112/65 (81) 94 Nasal Cannula 1.5 


 


2/19/18 00:09 36.7 63 16 105/65 (78) 94 Room Air  


 


2/18/18 23:59      Room Air  








Last Recorded Weight-Kilograms:  81.800


Physical Exam


The patient is alert and oriented. Mood and affect appeared normal. He answered 

all questions appropriately.


HEENT:  Pupils are equal and reactive to light and accommodation.  Extraocular 

movements are intact. The sclerae are anicteric.


Neuro:  Cranial nerves intact


Neck:  Patient's neck is supple.  He has palpable carotid pulses bilaterally 

without bruits on auscultation.  There is no evidence of jugular venous 

distention. The thyroid is not enlarged. 


Lungs:  Good air movement bilaterally. He had some very fine crackles in both 

lung fields


Cardiac:  Heart demonstrates an irregular rate and rhythm.  Normal S1 and S2. 

No murmurs on examination.


Pulses:  The patient has palpable radial pulses bilaterally that are equal in 

intensity


Extremities:  Continues to have significant lower extremity edema.


Skin:  I did not appreciate any rashes on examination today.





Data


Laboratory Results:





Last 24 Hours








Test


  2/19/18


08:16 2/19/18


09:05 2/19/18


10:01


 


White Blood Count 6.59 K/uL   


 


Red Blood Count 5.08 M/uL   


 


Hemoglobin 16.0 g/dL   


 


Hematocrit 46.8 %   


 


Mean Corpuscular Volume 92.1 fL   


 


Mean Corpuscular Hemoglobin 31.5 pg   


 


Mean Corpuscular Hemoglobin


Concent 34.2 g/dl 


  


  


 


 


Platelet Count 97 K/uL   


 


Mean Platelet Volume 10.6 fL   


 


Neutrophils (%) (Auto) 70.2 %   


 


Lymphocytes (%) (Auto) 17.0 %   


 


Monocytes (%) (Auto) 11.4 %   


 


Eosinophils (%) (Auto) 0.6 %   


 


Basophils (%) (Auto) 0.5 %   


 


Neutrophils # (Auto) 4.63 K/uL   


 


Lymphocytes # (Auto) 1.12 K/uL   


 


Monocytes # (Auto) 0.75 K/uL   


 


Eosinophils # (Auto) 0.04 K/uL   


 


Basophils # (Auto) 0.03 K/uL   


 


RDW Standard Deviation 55.8 fL   


 


RDW Coefficient of Variation 16.6 %   


 


Immature Granulocyte % (Auto) 0.3 %   


 


Immature Granulocyte # (Auto) 0.02 K/uL   


 


Sodium Level 130 mmol/L   


 


Potassium Level  mmol/L  4.2 mmol/L  


 


Chloride Level 91 mmol/L   


 


Carbon Dioxide Level 31 mmol/L   


 


Anion Gap 8.0 mmol/L   


 


Blood Urea Nitrogen 31 mg/dl   


 


Creatinine 1.27 mg/dl   


 


Est Creatinine Clear Calc


Drug Dose 42.0 ml/min 


  


  


 


 


Estimated GFR (


American) 58.5 


  


  


 


 


Estimated GFR (Non-


American 50.5 


  


  


 


 


BUN/Creatinine Ratio 24.6   


 


Random Glucose 140 mg/dl   


 


Calcium Level 10.2 mg/dl   


 


Magnesium Level  mg/dl  2.0 mg/dl  


 


25-Hydroxy Vitamin D Total   30.0 ng/ml 


 


Parathyroid Hormone (Intact)   160.5 pg/mL 











Telemetry reviewed:  Atrial fibrillation with normal ventricular rates





Assessment and Plan


1. Acute on chronic decompensated diastolic heart failure:  Overall he seems to 

be improving.  He is affecting good diuresis every day.  His renal function 

appears to be stable.  His dosing of Bumex was reduced to twice daily.  I think 

it is reasonable to see if he continues to diurese on this dose.  At some point 

will need to transition him to an oral regimen


.


2.  Atrial fibrillation:  Chronic.  Good heart rates currently.  I think we can 

evaluate his LV function and determine if he needs beta-blocker at all.





3. Pleural effusion:  He is doing well status post thoracentesis.





4. Elevated troponin:  Chronic.  No symptoms of chest discomfort





5. Pericardial effusion:  Will re-evaluate today.

## 2018-02-20 VITALS
HEART RATE: 67 BPM | SYSTOLIC BLOOD PRESSURE: 109 MMHG | DIASTOLIC BLOOD PRESSURE: 65 MMHG | TEMPERATURE: 97.7 F | OXYGEN SATURATION: 90 %

## 2018-02-20 VITALS
TEMPERATURE: 97.52 F | HEART RATE: 54 BPM | OXYGEN SATURATION: 91 % | DIASTOLIC BLOOD PRESSURE: 67 MMHG | SYSTOLIC BLOOD PRESSURE: 110 MMHG

## 2018-02-20 VITALS
DIASTOLIC BLOOD PRESSURE: 62 MMHG | HEART RATE: 57 BPM | TEMPERATURE: 97.7 F | OXYGEN SATURATION: 94 % | SYSTOLIC BLOOD PRESSURE: 113 MMHG

## 2018-02-20 VITALS
SYSTOLIC BLOOD PRESSURE: 110 MMHG | HEART RATE: 61 BPM | DIASTOLIC BLOOD PRESSURE: 58 MMHG | TEMPERATURE: 97.88 F | OXYGEN SATURATION: 92 %

## 2018-02-20 LAB
ALBUMIN SERPL-MCNC: 2.7 GM/DL (ref 3.4–5)
ALP SERPL-CCNC: 103 U/L (ref 45–117)
ALT SERPL-CCNC: 21 U/L (ref 12–78)
AST SERPL-CCNC: 33 U/L (ref 15–37)
BASOPHILS # BLD: 0.02 K/UL (ref 0–0.2)
BASOPHILS NFR BLD: 0.3 %
BUN SERPL-MCNC: 36 MG/DL (ref 7–18)
CALCIUM SERPL-MCNC: 10.3 MG/DL (ref 8.5–10.1)
CO2 SERPL-SCNC: 33 MMOL/L (ref 21–32)
CREAT SERPL-MCNC: 1.24 MG/DL (ref 0.6–1.4)
EOS ABS #: 0.05 K/UL (ref 0–0.5)
EOSINOPHIL NFR BLD AUTO: 114 K/UL (ref 130–400)
GLUCOSE SERPL-MCNC: 100 MG/DL (ref 70–99)
HCT VFR BLD CALC: 45 % (ref 42–52)
HGB BLD-MCNC: 15.6 G/DL (ref 14–18)
IG#: 0.01 K/UL (ref 0–0.02)
IMM GRANULOCYTES NFR BLD AUTO: 19.3 %
LYMPHOCYTES # BLD: 1.16 K/UL (ref 1.2–3.4)
MCH RBC QN AUTO: 31.8 PG (ref 25–34)
MCHC RBC AUTO-ENTMCNC: 34.7 G/DL (ref 32–36)
MCV RBC AUTO: 91.8 FL (ref 80–100)
MONO ABS #: 0.78 K/UL (ref 0.11–0.59)
MONOCYTES NFR BLD: 13 %
NEUT ABS #: 3.99 K/UL (ref 1.4–6.5)
NEUTROPHILS # BLD AUTO: 0.8 %
NEUTROPHILS NFR BLD AUTO: 66.4 %
PMV BLD AUTO: 10.7 FL (ref 7.4–10.4)
POTASSIUM SERPL-SCNC: 4 MMOL/L (ref 3.5–5.1)
PROT SERPL-MCNC: 6 GM/DL (ref 6.4–8.2)
RED CELL DISTRIBUTION WIDTH CV: 16.6 % (ref 11.5–14.5)
RED CELL DISTRIBUTION WIDTH SD: 55.6 FL (ref 36.4–46.3)
SODIUM SERPL-SCNC: 130 MMOL/L (ref 136–145)
WBC # BLD AUTO: 6.01 K/UL (ref 4.8–10.8)

## 2018-02-20 RX ADMIN — BUMETANIDE SCH MG: 1 TABLET ORAL at 18:41

## 2018-02-20 RX ADMIN — APIXABAN SCH MG: 2.5 TABLET, FILM COATED ORAL at 07:55

## 2018-02-20 RX ADMIN — POTASSIUM CHLORIDE SCH MEQ: 1500 TABLET, EXTENDED RELEASE ORAL at 07:55

## 2018-02-20 RX ADMIN — APIXABAN SCH MG: 2.5 TABLET, FILM COATED ORAL at 20:48

## 2018-02-20 RX ADMIN — PANTOPRAZOLE SCH MG: 40 TABLET, DELAYED RELEASE ORAL at 07:56

## 2018-02-20 RX ADMIN — STANDARDIZED SENNA CONCENTRATE AND DOCUSATE SODIUM SCH TAB: 8.6; 5 TABLET ORAL at 07:56

## 2018-02-20 RX ADMIN — BUMETANIDE SCH MLS/MIN: 0.25 INJECTION INTRAMUSCULAR; INTRAVENOUS at 07:54

## 2018-02-20 RX ADMIN — CEFTRIAXONE SODIUM SCH MLS/HR: 1 INJECTION, POWDER, FOR SOLUTION INTRAVENOUS at 14:30

## 2018-02-20 RX ADMIN — TAMSULOSIN HYDROCHLORIDE SCH MG: 0.4 CAPSULE ORAL at 20:47

## 2018-02-20 RX ADMIN — STANDARDIZED SENNA CONCENTRATE AND DOCUSATE SODIUM SCH TAB: 8.6; 5 TABLET ORAL at 20:48

## 2018-02-20 RX ADMIN — FINASTERIDE SCH MG: 5 TABLET, FILM COATED ORAL at 07:56

## 2018-02-20 NOTE — HOSPITALIST PROGRESS NOTE
Hospitalist Progress Note


Date of Service


Feb 20, 2018.





Subjective


Pt evaluation today including:  conversation w/ patient, conversation w/ 

consultant (Cardiology)


Voiding:  no voiding problems


Patient feeling better.  He does not feel short of breath.  He continues to 

diurese.  He denies chest pain.  Discussed the case with cardiology regarding 

his large pericardial effusion on echocardiogram this is slightly larger than 

before, no tamponade.  Cardiology says we will not drain it at this time.


Patient does feel stronger and was able to get out of bed with assistance to go 

to the bathroom and is about to walk with physical therapy currently.


Telemetry with atrial fibrillation with rates in the low to mid 60s with PVCs.





   All Other Systems:  Reviewed and Negative





Objective


Vital Signs











  Date Time  Temp Pulse Resp B/P (MAP) Pulse Ox O2 Delivery O2 Flow Rate FiO2


 


2/20/18 14:52 36.4 54 18 110/67 (81) 91 Room Air  


 


2/20/18 12:00      Room Air  


 


2/20/18 11:58 36.5 57 20 113/62 (79) 94 Room Air  


 


2/20/18 08:00      Room Air  


 


2/20/18 07:48 36.5 67 16 109/65 (80) 90 Room Air  


 


2/20/18 03:40 36.6 61 16 110/58 (75) 92   


 


2/20/18 03:15      Room Air  


 


2/20/18 00:00      Room Air  


 


2/19/18 23:10 36.5 59 18 119/66 (83) 93 Room Air  


 


2/19/18 21:27      Room Air  


 


2/19/18 19:36 36.5 59 20 117/73 (88) 94 Room Air  


 


2/19/18 16:00      Room Air  


 


2/19/18 15:49 36.4 58 18 113/63 (80) 95 Room Air  











Physical Exam


General Appearance:  WD/WN, no apparent distress


Eyes:  normal inspection, sclerae normal


ENT:  + pertinent finding ([place]\)


Neck:  trachea midline


Respiratory/Chest:  no respiratory distress, no accessory muscle use, + 

decreased breath sounds (At the bases bilaterally, otherwise clear)


Cardiovascular:  + irregularly irregular (With normal rate)


Abdomen:  normal bowel sounds, non tender, soft


Extremities:  no calf tenderness, + swelling (2-3+ pitting edema to the thighs 

bilaterally lower extremity)


Neurologic/Psychiatric:  alert, normal mood/affect, oriented x 3


Skin:  warm/dry, no rash (Erythema of right leg is pretty much resolved)





Laboratory Results





Last 24 Hours








Test


  2/20/18


05:37


 


White Blood Count 6.01 K/uL 


 


Red Blood Count 4.90 M/uL 


 


Hemoglobin 15.6 g/dL 


 


Hematocrit 45.0 % 


 


Mean Corpuscular Volume 91.8 fL 


 


Mean Corpuscular Hemoglobin 31.8 pg 


 


Mean Corpuscular Hemoglobin


Concent 34.7 g/dl 


 


 


Platelet Count 114 K/uL 


 


Mean Platelet Volume 10.7 fL 


 


Neutrophils (%) (Auto) 66.4 % 


 


Lymphocytes (%) (Auto) 19.3 % 


 


Monocytes (%) (Auto) 13.0 % 


 


Eosinophils (%) (Auto) 0.8 % 


 


Basophils (%) (Auto) 0.3 % 


 


Neutrophils # (Auto) 3.99 K/uL 


 


Lymphocytes # (Auto) 1.16 K/uL 


 


Monocytes # (Auto) 0.78 K/uL 


 


Eosinophils # (Auto) 0.05 K/uL 


 


Basophils # (Auto) 0.02 K/uL 


 


RDW Standard Deviation 55.6 fL 


 


RDW Coefficient of Variation 16.6 % 


 


Immature Granulocyte % (Auto) 0.2 % 


 


Immature Granulocyte # (Auto) 0.01 K/uL 


 


Sodium Level 130 mmol/L 


 


Potassium Level 4.0 mmol/L 


 


Chloride Level 92 mmol/L 


 


Carbon Dioxide Level 33 mmol/L 


 


Anion Gap 5.0 mmol/L 


 


Blood Urea Nitrogen 36 mg/dl 


 


Creatinine 1.24 mg/dl 


 


Est Creatinine Clear Calc


Drug Dose 43.3 ml/min 


 


 


Estimated GFR (


American) 60.2 


 


 


Estimated GFR (Non-


American 51.9 


 


 


BUN/Creatinine Ratio 28.9 


 


Random Glucose 100 mg/dl 


 


Calcium Level 10.3 mg/dl 


 


Magnesium Level 2.3 mg/dl 


 


Total Bilirubin 1.7 mg/dl 


 


Direct Bilirubin 0.6 mg/dl 


 


Aspartate Amino Transf


(AST/SGOT) 33 U/L 


 


 


Alanine Aminotransferase


(ALT/SGPT) 21 U/L 


 


 


Alkaline Phosphatase 103 U/L 


 


Total Protein 6.0 gm/dl 


 


Albumin 2.7 gm/dl 











Assessment and Plan


Patient is an 87-year-old male with a history of chronic diastolic CHF, 

permanent AF on anticoagulation with Eliquis, large pericardial effusion, HTN, 

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

, who presents with acute on chronic diastolic CHF with anasarca and enlarging 

bilateral left greater than right pleural effusions. Also with RLE cellulitis.








Acute on chronic Diastolic CHF/Bilateral pleural effusions L>R/Chronic large 

pericardial effusion without tamponade:- currently at a neg balance at - 7.0 L, 

continues to diurese and is feeling better, still with large amount of edema/

anasarca. Dopplers LEs neg for DVT.  Creatinine stable


Repeat chest x-ray 2/16 with minimal improvement of effusions--> had 

thoracentesis 2/18 with 1500 mL pulled off-transudative, nonbloody, pH 7.45, 

323 WBCs, cytology negative for malignancy, no growth to date on culture


-Switch to Bumex 2 mg p.o. twice daily and see if continues to be placed on 

this dose prior to discharge


-continue to monitor kidney function and electrolytes 


- Cardiology following - appreciate recommendations, no intervention on his 

pericardial effusion at this time


-f/u pleural fluid culture


-Consult pulmonology appreciated


-Cardiology also recommends not restarting lisinopril and beta-blocker given 

his purely diastolic CHF, and low blood pressures


-Despite large amount of edema, could probably go home tomorrow if continues to 

diurese on p.o. diuretics as he is asymptomatic now








A Flutter/A Fib: Rate Controlled, was bradycardic, now improved with holding 

Toprol 


-Will not restart Toprol XL as above with cardiology


-Continue Eliquis 5 mg BID 





RLE Cellulitis: Resolved, likely some component of chronic venous stasis 

dermatitis


-Last dose of Rocephin 1 g IV daily is today





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 

1.24 due to aggressive diuresis.  Is not oliguric


-Lisinopril has been on hold, but now will be discontinuing lisinopril 

permanently


-follow PRP


-Switching to p.o. Bumex as above


-Renally dose medications


-Avoid nephrotoxins





Chronic Progressive Hip Pain: exam c/w left trochanteric bursitis-much improved 

status post steroid injection by orthopedics


-Follow-up as needed





Hypercalcemia/hyperparathyroidism/history of parathyroid adenoma-corrected 

calcium is 11.2 here which seems fairly stable from previous.  Has had previous 

parathyroid adenoma resection twice in the past.  It seems he is followed with 

routine labs and has been fairly stable.


Intact PTH is elevated from previous at 160, 25-OH vitamin D is 30


-Bumex should help with hypercalcemia


-Follow-up as an outpatient





Code Status: FULL RESUSCITATION





DVT Prophylaxis: Eliquis 





Disposition:


- PT recommending SNF but not medically ready yet-hopefully tomorrow-plans are 

for [facility]

## 2018-02-20 NOTE — CARDIOLOGY FOLLOW-UP
Subjective


Date of Service:


Feb 20, 2018.


Pt evaluation today including:  conversation w/ patient, physical exam, chart 

review, lab review, review of studies, review of inpatient medication list, 

conversation w/ attending


History of Present Illness


I saw the patient early this morning and again this afternoon.  Overall he is 

feeling quite well.  He denies any breathing trouble his lying flat at the time 

of this interview.  He was up in a chair and ambulated to the bathroom.  His 

breathing is much improved.  He has not had an opportunity ambulate farther 

distances.  He states that his hip pain is well controlled currently.





Social History


Smoking Status:  Never Smoker


History of Alcohol Use:  No





Review of Systems


Respiratory:  + dyspnea on exertion


Cardiac:  No chest pain


Per HPI.





Objective





Vital Signs Past 12 Hours








  Date Time  Temp Pulse Resp B/P (MAP) Pulse Ox O2 Delivery O2 Flow Rate FiO2


 


2/20/18 14:52 36.4 54 18 110/67 (81) 91 Room Air  


 


2/20/18 12:00      Room Air  


 


2/20/18 11:58 36.5 57 20 113/62 (79) 94 Room Air  


 


2/20/18 08:00      Room Air  


 


2/20/18 07:48 36.5 67 16 109/65 (80) 90 Room Air  








Last Recorded Weight-Kilograms:  83.000


Intake & Output











8-Hour Column   


 


 2/20/18 2/20/18 2/21/18





 15:59 23:59 07:59


 


Intake Total 5780 ml  


 


Output Total 700 ml  


 


Balance 5080 ml  














24-Hour Column 


 


 2/21/18





 07:59


 


Intake Total 5780 ml


 


Output Total 700 ml


 


Balance 5080 ml








Physical Exam


The patient is alert and oriented. Mood and affect appeared normal. He answered 

all questions appropriately.


HEENT:  Pupils are equal and reactive to light and accommodation.  Extraocular 

movements are intact. The sclerae are anicteric.


Neuro:  Cranial nerves intact


Neck:  Patient's neck is supple.  He has palpable carotid pulses bilaterally 

without bruits on auscultation.  There is no evidence of jugular venous 

distention. The thyroid is not enlarged. 


Lungs:  Good air movement bilaterally. He had some very fine crackles in both 

lung fields


Cardiac:  Heart demonstrates an irregular rate and rhythm.  Normal S1 and S2. 

No murmurs on examination.


Pulses:  The patient has palpable radial pulses bilaterally that are equal in 

intensity


Extremities:  Continues to have significant lower extremity edema.


Skin:  I did not appreciate any rashes on examination today.





Data


Laboratory Results:





Last 24 Hours








Test


  2/20/18


05:37


 


White Blood Count 6.01 K/uL 


 


Red Blood Count 4.90 M/uL 


 


Hemoglobin 15.6 g/dL 


 


Hematocrit 45.0 % 


 


Mean Corpuscular Volume 91.8 fL 


 


Mean Corpuscular Hemoglobin 31.8 pg 


 


Mean Corpuscular Hemoglobin


Concent 34.7 g/dl 


 


 


Platelet Count 114 K/uL 


 


Mean Platelet Volume 10.7 fL 


 


Neutrophils (%) (Auto) 66.4 % 


 


Lymphocytes (%) (Auto) 19.3 % 


 


Monocytes (%) (Auto) 13.0 % 


 


Eosinophils (%) (Auto) 0.8 % 


 


Basophils (%) (Auto) 0.3 % 


 


Neutrophils # (Auto) 3.99 K/uL 


 


Lymphocytes # (Auto) 1.16 K/uL 


 


Monocytes # (Auto) 0.78 K/uL 


 


Eosinophils # (Auto) 0.05 K/uL 


 


Basophils # (Auto) 0.02 K/uL 


 


RDW Standard Deviation 55.6 fL 


 


RDW Coefficient of Variation 16.6 % 


 


Immature Granulocyte % (Auto) 0.2 % 


 


Immature Granulocyte # (Auto) 0.01 K/uL 


 


Sodium Level 130 mmol/L 


 


Potassium Level 4.0 mmol/L 


 


Chloride Level 92 mmol/L 


 


Carbon Dioxide Level 33 mmol/L 


 


Anion Gap 5.0 mmol/L 


 


Blood Urea Nitrogen 36 mg/dl 


 


Creatinine 1.24 mg/dl 


 


Est Creatinine Clear Calc


Drug Dose 43.3 ml/min 


 


 


Estimated GFR (


American) 60.2 


 


 


Estimated GFR (Non-


American 51.9 


 


 


BUN/Creatinine Ratio 28.9 


 


Random Glucose 100 mg/dl 


 


Calcium Level 10.3 mg/dl 


 


Magnesium Level 2.3 mg/dl 


 


Total Bilirubin 1.7 mg/dl 


 


Direct Bilirubin 0.6 mg/dl 


 


Aspartate Amino Transf


(AST/SGOT) 33 U/L 


 


 


Alanine Aminotransferase


(ALT/SGPT) 21 U/L 


 


 


Alkaline Phosphatase 103 U/L 


 


Total Protein 6.0 gm/dl 


 


Albumin 2.7 gm/dl 








Echocardiogram performed yesterday which revealed a large pericardial effusion 

but no evidence of tamponade.  He had preserved LV systolic function.





Assessment and Plan


1. Acute on chronic decompensated diastolic heart failure:  He is doing quite 

well and reduce diuretic regimen.  I think would be reasonable this point to 

try and switch him to an oral regimen of bumetanide.  I think we continue to 

have an element of diuresis on oral regimen it would be safe to discharge him 

with continued outpatient follow-up.  His lisinopril and beta blocker have been 

held.  Given his preserved LV systolic function at this point I think it is 

reasonable to discontinue those medications.


.


2.  Atrial fibrillation:  Chronic.  Good heart rates currently.  Given his 

preserved LV systolic function, he does not appear to have a pressing 

indication for beta-blockade.





3. Pleural effusion:  He is doing well status post thoracentesis.





4. Elevated troponin:  Chronic.  No symptoms of chest discomfort





5. Pericardial effusion:  Still a large effusion.  There is not appear to be 

any evidence of compromised filling.  He has had a fusion for over 2 years.  It 

is enlarging slightly.  However, I think in the absence of hemodynamic 

compromise or refractory symptoms of heart failure we can defer any treatment 

at this time.

## 2018-02-21 VITALS
DIASTOLIC BLOOD PRESSURE: 70 MMHG | OXYGEN SATURATION: 92 % | SYSTOLIC BLOOD PRESSURE: 122 MMHG | HEART RATE: 59 BPM | TEMPERATURE: 97.7 F

## 2018-02-21 VITALS
DIASTOLIC BLOOD PRESSURE: 66 MMHG | HEART RATE: 64 BPM | SYSTOLIC BLOOD PRESSURE: 117 MMHG | TEMPERATURE: 97.16 F | OXYGEN SATURATION: 93 %

## 2018-02-21 VITALS
SYSTOLIC BLOOD PRESSURE: 117 MMHG | OXYGEN SATURATION: 91 % | DIASTOLIC BLOOD PRESSURE: 67 MMHG | HEART RATE: 61 BPM | TEMPERATURE: 97.88 F

## 2018-02-21 VITALS
DIASTOLIC BLOOD PRESSURE: 62 MMHG | OXYGEN SATURATION: 91 % | SYSTOLIC BLOOD PRESSURE: 106 MMHG | HEART RATE: 56 BPM | TEMPERATURE: 97.34 F

## 2018-02-21 LAB
BASOPHILS # BLD: 0.02 K/UL (ref 0–0.2)
BASOPHILS NFR BLD: 0.3 %
BUN SERPL-MCNC: 36 MG/DL (ref 7–18)
CALCIUM SERPL-MCNC: 10.4 MG/DL (ref 8.5–10.1)
CO2 SERPL-SCNC: 35 MMOL/L (ref 21–32)
CREAT SERPL-MCNC: 1.22 MG/DL (ref 0.6–1.4)
EOS ABS #: 0.05 K/UL (ref 0–0.5)
EOSINOPHIL NFR BLD AUTO: 113 K/UL (ref 130–400)
GLUCOSE SERPL-MCNC: 93 MG/DL (ref 70–99)
HCT VFR BLD CALC: 44.1 % (ref 42–52)
HGB BLD-MCNC: 15.2 G/DL (ref 14–18)
IG#: 0.02 K/UL (ref 0–0.02)
IMM GRANULOCYTES NFR BLD AUTO: 17.9 %
LYMPHOCYTES # BLD: 1.12 K/UL (ref 1.2–3.4)
MCH RBC QN AUTO: 31.7 PG (ref 25–34)
MCHC RBC AUTO-ENTMCNC: 34.5 G/DL (ref 32–36)
MCV RBC AUTO: 92.1 FL (ref 80–100)
MONO ABS #: 0.72 K/UL (ref 0.11–0.59)
MONOCYTES NFR BLD: 11.5 %
NEUT ABS #: 4.34 K/UL (ref 1.4–6.5)
NEUTROPHILS # BLD AUTO: 0.8 %
NEUTROPHILS NFR BLD AUTO: 69.2 %
PMV BLD AUTO: 10.3 FL (ref 7.4–10.4)
POTASSIUM SERPL-SCNC: 3.8 MMOL/L (ref 3.5–5.1)
RED CELL DISTRIBUTION WIDTH CV: 16.4 % (ref 11.5–14.5)
RED CELL DISTRIBUTION WIDTH SD: 55.2 FL (ref 36.4–46.3)
SODIUM SERPL-SCNC: 132 MMOL/L (ref 136–145)
WBC # BLD AUTO: 6.27 K/UL (ref 4.8–10.8)

## 2018-02-21 RX ADMIN — APIXABAN SCH MG: 2.5 TABLET, FILM COATED ORAL at 19:24

## 2018-02-21 RX ADMIN — PANTOPRAZOLE SCH MG: 40 TABLET, DELAYED RELEASE ORAL at 08:00

## 2018-02-21 RX ADMIN — POTASSIUM CHLORIDE SCH MEQ: 1500 TABLET, EXTENDED RELEASE ORAL at 08:01

## 2018-02-21 RX ADMIN — FINASTERIDE SCH MG: 5 TABLET, FILM COATED ORAL at 08:01

## 2018-02-21 RX ADMIN — APIXABAN SCH MG: 2.5 TABLET, FILM COATED ORAL at 08:00

## 2018-02-21 RX ADMIN — TAMSULOSIN HYDROCHLORIDE SCH MG: 0.4 CAPSULE ORAL at 21:17

## 2018-02-21 RX ADMIN — BUMETANIDE SCH MG: 1 TABLET ORAL at 08:01

## 2018-02-21 RX ADMIN — STANDARDIZED SENNA CONCENTRATE AND DOCUSATE SODIUM SCH TAB: 8.6; 5 TABLET ORAL at 08:00

## 2018-02-21 RX ADMIN — STANDARDIZED SENNA CONCENTRATE AND DOCUSATE SODIUM SCH TAB: 8.6; 5 TABLET ORAL at 19:23

## 2018-02-21 NOTE — HOSPITALIST PROGRESS NOTE
Hospitalist Progress Note


Date of Service


Feb 21, 2018.


 (Norma Garcia PA-C)





Subjective


Pt evaluation today including:  conversation w/ patient, conversation w/ family

, physical exam, chart review, lab review, review of studies, review of 

inpatient medication list


Patient seen and evaluated. Reporting feeling good. Still with significant 

edema which may take time to resolve.


Breathing much better since his thoracentesis. Eager for rehab. Reporting hip 

is much better since injection.





   Constitutional:  No fever, No chills


   Respiratory:  No cough, No shortness of breath


   Cardiovascular:  No chest pain


   Abdomen:  No pain, No nausea, No vomiting


   Musculoskeletal:  + swelling (b/l leg edema L>R)


   Male :  No dysuria


   Heme:  No abnormal bleeding/bruising (Norma Garcia, KENNY)





Medications





Current Inpatient Medications








 Medications


  (Trade)  Dose


 Ordered  Sig/Rudy


 Route  Start Time


 Stop Time Status Last Admin


Dose Admin


 


 Finasteride


  (Proscar Tab)  5 mg  QAM


 PO  2/15/18 09:00


 3/17/18 08:59  2/21/18 08:01


5 MG


 


 Pantoprazole


 Sodium


  (Protonix Tab)  40 mg  DAILY


 PO  2/15/18 09:00


 3/17/18 08:59  2/21/18 08:00


40 MG


 


 Potassium Chloride


  (Klor-Con Tab)  20 meq  DAILY


 PO  2/15/18 09:00


 3/17/18 08:59  2/21/18 08:01


20 MEQ


 


 Senna/Docusate


 Sodium


  (Senokot S Tab)  1 tab  BID


 PO  2/15/18 09:00


 3/17/18 08:59  2/21/18 19:23


1 TAB


 


 Tamsulosin HCl


  (Flomax Cap)  0.4 mg  HS


 PO  2/15/18 21:00


 3/17/18 20:59  2/20/18 20:47


0.4 MG


 


 Tramadol HCl


  (Ultram Tab)  50 mg  Q6H  PRN


 PO  2/14/18 21:30


 3/16/18 21:29  2/16/18 00:31


50 MG


 


 Apixaban


  (Eliquis Tab)  5 mg  BID


 PO  2/15/18 09:00


 3/17/18 08:59 Future hold 2/21/18 19:24


5 MG


 


 Albuterol Sulfate


  (Ventolin 0.083%


 2.5MG/3ML Neb)  2.5 mg  Q6R  PRN


 INH  2/14/18 21:30


 3/16/18 21:29   


 


 


 Acetaminophen


  (Tylenol Tab)  650 mg  Q4H  PRN


 PO  2/14/18 22:00


 3/16/18 21:59   


 


 


 Al Hydrox/Mg


 Hydrox/Simethicone


  (Maalox Max Susp)  15 ml  Q4H  PRN


 PO  2/14/18 22:00


 3/16/18 21:59   


 


 


 Magnesium


 Hydroxide


  (Milk Of


 Magnesia Susp)  30 ml  Q12H  PRN


 PO  2/14/18 22:00


 3/16/18 21:59   


 


 


 Ondansetron HCl


  (Zofran Inj)  4 mg  Q6H  PRN


 IV  2/14/18 22:00


 3/16/18 21:59   


 


 


 Morphine Sulfate


  (MoRPHine


 SULFATE INJ)  2 mg  Q30M  PRN


 IV  2/14/18 22:00


 2/28/18 21:59   


 


 


 Polyethylene


  (Miralax Powder


 Packet)  17 gm  DAILY  PRN


 PO  2/14/18 22:00


 3/16/18 21:59   


 


 


 Bumetanide


  (Bumex Tab)  2 mg  BID17


 PO  2/20/18 17:00


 3/22/18 16:59 Future Hold 2/21/18 08:01


2 MG








 (Norma Garcia PA-C)





Objective


Vital Signs











  Date Time  Temp Pulse Resp B/P (MAP) Pulse Ox O2 Delivery O2 Flow Rate FiO2


 


2/21/18 16:00      Room Air  


 


2/21/18 14:50 36.5 59 16 122/70 (87) 92 Room Air  


 


2/21/18 11:49 36.3 56 16 106/62 (77) 91 Room Air  


 


2/21/18 09:00      Room Air  


 


2/21/18 07:16      Room Air  


 


2/21/18 07:16 36.6 61 21 117/67 (84) 91 Room Air  


 


2/21/18 01:32 36.2 64 20 117/66 (83) 93 Room Air  


 


2/21/18 00:00      Room Air  








 (Norma Garcia PA-C)





Physical Exam


General Appearance:  no apparent distress


Neck:  supple


Respiratory/Chest:  lungs clear, no respiratory distress, no accessory muscle 

use, + decreased breath sounds


Cardiovascular:  + irregularly irregular


Abdomen:  normal bowel sounds, non tender, soft


Extremities:  + swelling (2-3+ pitting edema extending to thigh)


Neurologic/Psychiatric:  alert


Skin:  warm/dry


 (Norma Garcia PA-C)





Laboratory Results





Last 24 Hours








Test


  2/21/18


07:45


 


White Blood Count 6.27 K/uL 


 


Red Blood Count 4.79 M/uL 


 


Hemoglobin 15.2 g/dL 


 


Hematocrit 44.1 % 


 


Mean Corpuscular Volume 92.1 fL 


 


Mean Corpuscular Hemoglobin 31.7 pg 


 


Mean Corpuscular Hemoglobin


Concent 34.5 g/dl 


 


 


Platelet Count 113 K/uL 


 


Mean Platelet Volume 10.3 fL 


 


Neutrophils (%) (Auto) 69.2 % 


 


Lymphocytes (%) (Auto) 17.9 % 


 


Monocytes (%) (Auto) 11.5 % 


 


Eosinophils (%) (Auto) 0.8 % 


 


Basophils (%) (Auto) 0.3 % 


 


Neutrophils # (Auto) 4.34 K/uL 


 


Lymphocytes # (Auto) 1.12 K/uL 


 


Monocytes # (Auto) 0.72 K/uL 


 


Eosinophils # (Auto) 0.05 K/uL 


 


Basophils # (Auto) 0.02 K/uL 


 


RDW Standard Deviation 55.2 fL 


 


RDW Coefficient of Variation 16.4 % 


 


Immature Granulocyte % (Auto) 0.3 % 


 


Immature Granulocyte # (Auto) 0.02 K/uL 


 


Sodium Level 132 mmol/L 


 


Potassium Level 3.8 mmol/L 


 


Chloride Level 92 mmol/L 


 


Carbon Dioxide Level 35 mmol/L 


 


Anion Gap 5.0 mmol/L 


 


Blood Urea Nitrogen 36 mg/dl 


 


Creatinine 1.22 mg/dl 


 


Est Creatinine Clear Calc


Drug Dose 44.0 ml/min 


 


 


Estimated GFR (


American) 61.4 


 


 


Estimated GFR (Non-


American 53.0 


 


 


BUN/Creatinine Ratio 29.4 


 


Random Glucose 93 mg/dl 


 


Calcium Level 10.4 mg/dl 


 


Magnesium Level 2.2 mg/dl 








 (Norma Garcia PA-C)





Assessment and Plan


Patient is an 87-year-old male with a history of chronic diastolic CHF, 

permanent AF on anticoagulation with Eliquis, large pericardial effusion, HTN, 

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

, who presents with acute on chronic diastolic CHF with anasarca and enlarging 

bilateral left greater than right pleural effusions. Also with RLE cellulitis.





Acute on Chronic Diastolic CHF/Bilateral Pleural Effusions L>R/Chronic Large 

Pericardial Effusion without Tamponade: Neg balance of - 7.2 L but still 

significant anasarca/edema; Dopplers LEs neg for DVT.  Creatinine remains 

largely unchanged even with aggressive diuresis


- S/P Thoracentesis on 2/18 for 1500 cc - transudative


- Continue Bumex 2 mg IV BID at this time - plan for oral conversion on D/C as 

a total of 120 mg daily of Lasix with minimal overall benefit


- Continue to trend kidney function - minimal change at this time


- Cardiology following - appreciate recommendations - no compromise with 

pericardial effusion and will monitor at this time


- Pulmonology followed for thoracentesis





A Flutter/A Fib: Rate Controlled


-Will not restart Toprol XL as above per cardiology


-Continue Eliquis 5 mg BID 





RLE Cellulitis: RESOLVED - Likely component of chronic venous stasis


- Course of Abx completed 





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


- Plan to D/C Lisinopril on D/C


- Follow BMP and avoid nephrotoxins; continue renally dosed medications





Chronic Progressive Hip Pain: Trochanteric Bursitis:


- S/P injection - reporting significant improvement





Hypercalcemia/Hyperparathyroidism with H/O Parathyroid Adenoma


- Has had parathyroid adenoma resection x 2 - can follow-up as outpatient


- Bumex should help with hypercalcemia





Code Status: FULL RESUSCITATION





DVT Prophylaxis: Eliquis 





Disposition:


- [facility] auth pending - patient remains significantly edematous but 

asymptomatic possibly DC tomorrow?


Continued [facility] stay due to:  ambulation difficulties, multiple IV medications 

needed


Discharge planning:  skilled nursing facility


 (Norma Garcia PA-C)


Attending Attestation:


Pt seen/examined, chart reviewed, care plan d/w ANTONIO Garcia.


I agree w/ the smith components of her documentation.





pt w/o dyspnea or chest pain


still w/ significant edema of legs


left hip pain is resolved





VSS


no fever


o2 sats low 90s in RA





gen - NAD


neck - still with JVD


heart - irregular, s1, s2


lungs - bibasilar fine rales


abd - still with swelling/probable ascites


ext - still with 2-3+ edema to the thighs b/l





BMP - stable/acceptable





A/P:


1.  acute/chronic diastolic CHF - still with considerable volume overload; 

would stop PO bumex and change to IV bumex 2mg BID.  


Daily BMP, mag. 


2.  left-sided pleural effusion s/p thoracentesis - fluid was transudative from 

CHF.  Marked symptomatic improvement. 


3.  hyponatremia - 2nd to diuresis.  Stable. 


4.  left hip bursitis - s/p steroid injection - improved. 


5.  hypercalcemia 2nd to hyperparathyroidism - stable. 


6.  CKD stage 3 - creatinine stable. 


7.  a flutter - rates controlled/stable. 





continue to spike GAN MD


 (Zheng Gan MD)

## 2018-02-21 NOTE — DISCHARGE INSTRUCTIONS
Discharge Instructions


Date of Service


Feb 21, 2018.





Admission


Reason for Admission:  Cellulitis Of Right Lower Extremity, Chf Exacerbat





Discharge


Discharge Diagnosis / Problem:  CHF Exacerbation





Discharge Goals


Goal(s):  Decrease discomfort, Improve function, Increase independence





Activity Recommendations


Activity Level:  Assistance Required


Therapies:  Physical Therapy, Occupational Therapy





.





Additional Information


Patient informed of condition:  Yes


Advance Directives:  Yes


DNR:  No


Level of Care:  Skilled


Communicable Disease:  No


Prognosis:  Stable





Instructions / Follow-Up


Instructions / Follow-Up


Patient is an 87-year-old male with a history of chronic diastolic CHF, 

permanent AF on anticoagulation with Eliquis, large pericardial effusion, HTN, 

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

, who presents with acute on chronic diastolic CHF with anasarca and enlarging 

bilateral left greater than right pleural effusions. Also with RLE cellulitis.





Acute on chronic Diastolic CHF/Bilateral pleural effusions L>R/Chronic large 

pericardial effusion without tamponade:- currently at a neg balance at - 7.7 L, 

continues to diurese and is feeling better, still with large amount of edema/

anasarca. Dopplers LEs neg for DVT.  Creatinine stable


Repeat chest x-ray 2/16 with minimal improvement of effusions--> had 

thoracentesis 2/18 with 1500 mL pulled off-transudative, nonbloody, pH 7.45, 

323 WBCs, cytology negative for malignancy, no growth to date on culture


-Switch to Bumex 2 mg p.o. twice daily and will continue this currently - will 

need close cardiology follow-up to assess dosing adjustments


- Will provide Rx to have repeat BMP in 2-3 days to assess kidney function


- Cardiology followed - no intervention on his pericardial effusion at this time


-Cardiology also recommends not restarting lisinopril and beta-blocker given 

his purely diastolic CHF, and low blood pressures - These medications have been 

held on D/C





A Flutter/A Fib: Rate Controlled, was bradycardic, now improved with holding 

Toprol 


-Will not restart Toprol XL as above per cardiology


- Continue Eliquis 5 mg BID 





RLE Cellulitis: Resolved, likely some component of chronic venous stasis 

dermatitis


- Completed Antibiotics





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 

1.24 due to aggressive diuresis.  Is not oliguric


-Lisinopril will be D/C'd at this time


-Renally dose medications


-Avoid nephrotoxins





Chronic Progressive Hip Pain: exam c/w left trochanteric bursitis-much improved 

status post steroid injection by orthopedics


-Follow-up as needed with orthopedics





Hypercalcemia/hyperparathyroidism/history of parathyroid adenoma-corrected 

calcium is 11.2 here which seems fairly stable from previous.  Has had previous 

parathyroid adenoma resection twice in the past.  It seems he is followed with 

routine labs and has been fairly stable.


Intact PTH is elevated from previous at 160, 25-OH vitamin D is 30


-Bumex should help with hypercalcemia


-Follow-up as an outpatient





Code Status: FULL RESUSCITATION





Current Hospital Diet


Patient's current hospital diet: AHA Diet (Heart Healthy), Low Sodium Diet (2gm 

Na)





Discharge Diet


Recommended Diet:  AHA Diet (Heart Healthy), Low Sodium Diet (2gm Na)





Pending Studies


Studies pending at discharge:  no





Physician Orders On Transfer


POLST Discussion:  Not Applicable





Medical Emergencies








.


Who to Call and When:





Medical Emergencies:  If at any time you feel your situation is an emergency, 

please call 911 immediately.





.





Non-Emergent Contact


Non-Emergency issues call your:  Primary Care Provider


Call Non-Emergent contact if:  you have a fever, your pain is concerning you, 

you have any medication questions





.


.








"Provider Documentation" section prepared by Norma Garcia.








.





Core Measure Problem


Core Measures:  None

## 2018-02-21 NOTE — CARDIOLOGY FOLLOW-UP
Subjective


Date of Service:


Feb 21, 2018.


Pt evaluation today including:  conversation w/ patient, physical exam, chart 

review, lab review, review of studies, review of inpatient medication list


History of Present Illness


The patient claims to be feeling well. No dyspnea. He was up in a chair for a 

while. Some ambulation with improved breathing. Hip pain improved





Social History


Smoking Status:  Never Smoker


History of Alcohol Use:  No





Review of Systems


Respiratory:  + dyspnea on exertion


Cardiac:  No chest pain


Per HPI.





Objective





Vital Signs Past 12 Hours








  Date Time  Temp Pulse Resp B/P (MAP) Pulse Ox O2 Delivery O2 Flow Rate FiO2


 


2/21/18 14:50 36.5 59 16 122/70 (87) 92 Room Air  


 


2/21/18 11:49 36.3 56 16 106/62 (77) 91 Room Air  


 


2/21/18 09:00      Room Air  


 


2/21/18 07:16      Room Air  


 


2/21/18 07:16 36.6 61 21 117/67 (84) 91 Room Air  








Last Recorded Weight-Kilograms:  83.000


Intake & Output











8-Hour Column   


 


 2/21/18 2/22/18 2/22/18





 16:00 00:00 08:00


 


Intake Total 640 ml  


 


Output Total 150 ml  


 


Balance 490 ml  














24-Hour Column 


 


 2/22/18





 08:00


 


Intake Total 640 ml


 


Output Total 150 ml


 


Balance 490 ml








Physical Exam


The patient is alert and oriented. Mood and affect appeared normal. He answered 

all questions appropriately.


HEENT:  Pupils are equal and reactive to light and accommodation.  Extraocular 

movements are intact. The sclerae are anicteric.


Neuro:  Cranial nerves intact


Neck:  Patient's neck is supple.  He has palpable carotid pulses bilaterally 

without bruits on auscultation.  There is no evidence of jugular venous 

distention. The thyroid is not enlarged. 


Lungs:  Good air movement bilaterally. He had some very fine crackles in both 

lung fields


Cardiac:  Heart demonstrates an irregular rate and rhythm.  Normal S1 and S2. 

No murmurs on examination.


Pulses:  The patient has palpable radial pulses bilaterally that are equal in 

intensity


Extremities:  Continues to have significant lower extremity edema.


Skin:  I did not appreciate any rashes on examination today.





Data


Laboratory Results:





Last 24 Hours








Test


  2/21/18


07:45


 


White Blood Count 6.27 K/uL 


 


Red Blood Count 4.79 M/uL 


 


Hemoglobin 15.2 g/dL 


 


Hematocrit 44.1 % 


 


Mean Corpuscular Volume 92.1 fL 


 


Mean Corpuscular Hemoglobin 31.7 pg 


 


Mean Corpuscular Hemoglobin


Concent 34.5 g/dl 


 


 


Platelet Count 113 K/uL 


 


Mean Platelet Volume 10.3 fL 


 


Neutrophils (%) (Auto) 69.2 % 


 


Lymphocytes (%) (Auto) 17.9 % 


 


Monocytes (%) (Auto) 11.5 % 


 


Eosinophils (%) (Auto) 0.8 % 


 


Basophils (%) (Auto) 0.3 % 


 


Neutrophils # (Auto) 4.34 K/uL 


 


Lymphocytes # (Auto) 1.12 K/uL 


 


Monocytes # (Auto) 0.72 K/uL 


 


Eosinophils # (Auto) 0.05 K/uL 


 


Basophils # (Auto) 0.02 K/uL 


 


RDW Standard Deviation 55.2 fL 


 


RDW Coefficient of Variation 16.4 % 


 


Immature Granulocyte % (Auto) 0.3 % 


 


Immature Granulocyte # (Auto) 0.02 K/uL 


 


Sodium Level 132 mmol/L 


 


Potassium Level 3.8 mmol/L 


 


Chloride Level 92 mmol/L 


 


Carbon Dioxide Level 35 mmol/L 


 


Anion Gap 5.0 mmol/L 


 


Blood Urea Nitrogen 36 mg/dl 


 


Creatinine 1.22 mg/dl 


 


Est Creatinine Clear Calc


Drug Dose 44.0 ml/min 


 


 


Estimated GFR (


American) 61.4 


 


 


Estimated GFR (Non-


American 53.0 


 


 


BUN/Creatinine Ratio 29.4 


 


Random Glucose 93 mg/dl 


 


Calcium Level 10.4 mg/dl 


 


Magnesium Level 2.2 mg/dl 











Assessment and Plan


1. Acute on chronic decompensated diastolic heart failure:  He was switched to 

oral Bumex. Will continue to diurese. Monitor electrolytes and renal function. 


.


2.  Atrial fibrillation:  Chronic.  Good heart rates currently without BB





3. Pleural effusion:  He is doing well status post thoracentesis.





4. Elevated troponin:  Chronic.  No symptoms of chest discomfort





5. Pericardial effusion:  Chronic

## 2018-02-22 VITALS
HEART RATE: 57 BPM | DIASTOLIC BLOOD PRESSURE: 56 MMHG | TEMPERATURE: 97.34 F | OXYGEN SATURATION: 92 % | SYSTOLIC BLOOD PRESSURE: 108 MMHG

## 2018-02-22 VITALS
DIASTOLIC BLOOD PRESSURE: 64 MMHG | OXYGEN SATURATION: 92 % | SYSTOLIC BLOOD PRESSURE: 110 MMHG | TEMPERATURE: 97.16 F | HEART RATE: 56 BPM

## 2018-02-22 VITALS
HEART RATE: 54 BPM | TEMPERATURE: 97.88 F | SYSTOLIC BLOOD PRESSURE: 122 MMHG | DIASTOLIC BLOOD PRESSURE: 70 MMHG | OXYGEN SATURATION: 94 %

## 2018-02-22 LAB
BUN SERPL-MCNC: 36 MG/DL (ref 7–18)
CALCIUM SERPL-MCNC: 10.6 MG/DL (ref 8.5–10.1)
CO2 SERPL-SCNC: 31 MMOL/L (ref 21–32)
CREAT SERPL-MCNC: 1.23 MG/DL (ref 0.6–1.4)
EOSINOPHIL NFR BLD AUTO: 113 K/UL (ref 130–400)
GLUCOSE SERPL-MCNC: 106 MG/DL (ref 70–99)
HCT VFR BLD CALC: 44.4 % (ref 42–52)
HGB BLD-MCNC: 15.4 G/DL (ref 14–18)
MCH RBC QN AUTO: 31.6 PG (ref 25–34)
MCHC RBC AUTO-ENTMCNC: 34.7 G/DL (ref 32–36)
MCV RBC AUTO: 91.2 FL (ref 80–100)
PMV BLD AUTO: 10.1 FL (ref 7.4–10.4)
POTASSIUM SERPL-SCNC: 4.1 MMOL/L (ref 3.5–5.1)
RED CELL DISTRIBUTION WIDTH CV: 16.5 % (ref 11.5–14.5)
RED CELL DISTRIBUTION WIDTH SD: 54.4 FL (ref 36.4–46.3)
SODIUM SERPL-SCNC: 131 MMOL/L (ref 136–145)
WBC # BLD AUTO: 7.14 K/UL (ref 4.8–10.8)

## 2018-02-22 RX ADMIN — ACETAMINOPHEN PRN MG: 325 TABLET ORAL at 19:58

## 2018-02-22 RX ADMIN — BUMETANIDE SCH MLS/MIN: 0.25 INJECTION INTRAMUSCULAR; INTRAVENOUS at 08:29

## 2018-02-22 RX ADMIN — POTASSIUM CHLORIDE SCH MEQ: 1500 TABLET, EXTENDED RELEASE ORAL at 08:29

## 2018-02-22 RX ADMIN — BUMETANIDE SCH MLS/MIN: 0.25 INJECTION INTRAMUSCULAR; INTRAVENOUS at 17:21

## 2018-02-22 RX ADMIN — PANTOPRAZOLE SCH MG: 40 TABLET, DELAYED RELEASE ORAL at 08:29

## 2018-02-22 RX ADMIN — STANDARDIZED SENNA CONCENTRATE AND DOCUSATE SODIUM SCH TAB: 8.6; 5 TABLET ORAL at 19:57

## 2018-02-22 RX ADMIN — STANDARDIZED SENNA CONCENTRATE AND DOCUSATE SODIUM SCH TAB: 8.6; 5 TABLET ORAL at 08:29

## 2018-02-22 RX ADMIN — APIXABAN SCH MG: 2.5 TABLET, FILM COATED ORAL at 19:57

## 2018-02-22 RX ADMIN — TAMSULOSIN HYDROCHLORIDE SCH MG: 0.4 CAPSULE ORAL at 19:57

## 2018-02-22 RX ADMIN — FINASTERIDE SCH MG: 5 TABLET, FILM COATED ORAL at 08:29

## 2018-02-22 RX ADMIN — APIXABAN SCH MG: 2.5 TABLET, FILM COATED ORAL at 08:29

## 2018-02-22 NOTE — HOSPITALIST PROGRESS NOTE
Hospitalist Progress Note


Date of Service


Feb 22, 2018.


 (Norma Garcia PA-C)





Subjective


Pt evaluation today including:  conversation w/ patient, physical exam, chart 

review, lab review, review of inpatient medication list


Patient seen and evaluated. No acute events overnight.


Continues at a negative balance of diuresis. Continues with significant 

swelling of the lower extremities.


Feels that his breathing is a lot better since thoracentesis. Hip continues to 

feel better since injection.





   Constitutional:  No fever, No chills


   Respiratory:  + dyspnea on exertion (improving), No dyspnea at rest


   Cardiovascular:  No chest pain


   Abdomen:  No pain, No nausea, No vomiting, No diarrhea, No constipation


   Musculoskeletal:  + swelling (possibly slightly feels improved), No joint 

pain, No calf pain


   Male :  No dysuria


   Heme:  No abnormal bleeding/bruising


   Skin:  No rash (Norma Garcia PA-C)





Medications





Current Inpatient Medications








 Medications


  (Trade)  Dose


 Ordered  Sig/Rudy


 Route  Start Time


 Stop Time Status Last Admin


Dose Admin


 


 Finasteride


  (Proscar Tab)  5 mg  QAM


 PO  2/15/18 09:00


 3/17/18 08:59  2/22/18 08:29


5 MG


 


 Pantoprazole


 Sodium


  (Protonix Tab)  40 mg  DAILY


 PO  2/15/18 09:00


 3/17/18 08:59  2/22/18 08:29


40 MG


 


 Potassium Chloride


  (Klor-Con Tab)  20 meq  DAILY


 PO  2/15/18 09:00


 3/17/18 08:59  2/22/18 08:29


20 MEQ


 


 Senna/Docusate


 Sodium


  (Senokot S Tab)  1 tab  BID


 PO  2/15/18 09:00


 3/17/18 08:59  2/22/18 08:29


1 TAB


 


 Tamsulosin HCl


  (Flomax Cap)  0.4 mg  HS


 PO  2/15/18 21:00


 3/17/18 20:59  2/21/18 21:17


0.4 MG


 


 Tramadol HCl


  (Ultram Tab)  50 mg  Q6H  PRN


 PO  2/14/18 21:30


 3/16/18 21:29  2/16/18 00:31


50 MG


 


 Apixaban


  (Eliquis Tab)  5 mg  BID


 PO  2/15/18 09:00


 3/17/18 08:59 Future hold 2/22/18 08:29


5 MG


 


 Albuterol Sulfate


  (Ventolin 0.083%


 2.5MG/3ML Neb)  2.5 mg  Q6R  PRN


 INH  2/14/18 21:30


 3/16/18 21:29   


 


 


 Acetaminophen


  (Tylenol Tab)  650 mg  Q4H  PRN


 PO  2/14/18 22:00


 3/16/18 21:59   


 


 


 Al Hydrox/Mg


 Hydrox/Simethicone


  (Maalox Max Susp)  15 ml  Q4H  PRN


 PO  2/14/18 22:00


 3/16/18 21:59   


 


 


 Magnesium


 Hydroxide


  (Milk Of


 Magnesia Susp)  30 ml  Q12H  PRN


 PO  2/14/18 22:00


 3/16/18 21:59   


 


 


 Ondansetron HCl


  (Zofran Inj)  4 mg  Q6H  PRN


 IV  2/14/18 22:00


 3/16/18 21:59   


 


 


 Morphine Sulfate


  (MoRPHine


 SULFATE INJ)  2 mg  Q30M  PRN


 IV  2/14/18 22:00


 2/28/18 21:59   


 


 


 Polyethylene


  (Miralax Powder


 Packet)  17 gm  DAILY  PRN


 PO  2/14/18 22:00


 3/16/18 21:59   


 


 


 Bumetanide


  (Bumex Tab)  2 mg  BID17


 PO  2/20/18 17:00


 3/22/18 16:59 Future Hold 2/21/18 08:01


2 MG


 


 Bumetanide 2 mg/


 Syringe  8 ml @ 4


 mls/min  DAILY@09,17


 IV  2/22/18 09:00


 3/24/18 08:59  2/22/18 08:29


4 MLS/MIN








 (Norma Garcia, PA-C)





Objective


Vital Signs











  Date Time  Temp Pulse Resp B/P (MAP) Pulse Ox O2 Delivery O2 Flow Rate FiO2


 


2/22/18 08:40      Room Air  


 


2/22/18 07:29 36.3 57 18 108/56 (73) 92 Room Air  


 


2/22/18 00:15      Room Air  


 


2/22/18 00:11 36.2 56 20 110/64 (79) 92 Room Air  


 


2/21/18 16:00      Room Air  


 


2/21/18 14:50 36.5 59 16 122/70 (87) 92 Room Air  








 (Norma Garcia PA-C)





Physical Exam


General Appearance:  no apparent distress


Neck:  supple, trachea midline, + JVD


Respiratory/Chest:  no respiratory distress, no accessory muscle use, + crackles

 (bases b/l)


Cardiovascular:  + irregularly irregular


Abdomen:  normal bowel sounds, non tender, soft


Extremities:  + swelling (extending to thigh with 2-3+ pitting edema)


Neurologic/Psychiatric:  alert


Skin:  normal color


 (Norma Garcia, PA-C)





Laboratory Results





Last 24 Hours








Test


  2/22/18


05:39


 


White Blood Count 7.14 K/uL 


 


Red Blood Count 4.87 M/uL 


 


Hemoglobin 15.4 g/dL 


 


Hematocrit 44.4 % 


 


Mean Corpuscular Volume 91.2 fL 


 


Mean Corpuscular Hemoglobin 31.6 pg 


 


Mean Corpuscular Hemoglobin


Concent 34.7 g/dl 


 


 


RDW Standard Deviation 54.4 fL 


 


RDW Coefficient of Variation 16.5 % 


 


Platelet Count 113 K/uL 


 


Mean Platelet Volume 10.1 fL 


 


Sodium Level 131 mmol/L 


 


Potassium Level 4.1 mmol/L 


 


Chloride Level 93 mmol/L 


 


Carbon Dioxide Level 31 mmol/L 


 


Anion Gap 7.0 mmol/L 


 


Blood Urea Nitrogen 36 mg/dl 


 


Creatinine 1.23 mg/dl 


 


Est Creatinine Clear Calc


Drug Dose 43.6 ml/min 


 


 


Estimated GFR (


American) 60.8 


 


 


Estimated GFR (Non-


American 52.5 


 


 


BUN/Creatinine Ratio 29.6 


 


Random Glucose 106 mg/dl 


 


Calcium Level 10.6 mg/dl 


 


Magnesium Level 2.3 mg/dl 








 (Norma Garcia, PA-C)





Assessment and Plan


Patient is an 87-year-old male with a history of chronic diastolic CHF, 

permanent AF on anticoagulation with Eliquis, large pericardial effusion, HTN, 

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

, who presents with acute on chronic diastolic CHF with anasarca and enlarging 

bilateral left greater than right pleural effusions. Also with RLE cellulitis.





Acute on Chronic Diastolic CHF/Bilateral Pleural Effusions L>R/Chronic Large 

Pericardial Effusion without Tamponade: Neg balance of - 7.2 L but still 

significant anasarca/edema; Dopplers LEs neg for DVT.  Creatinine remains 

largely unchanged even with aggressive diuresis


- S/P Thoracentesis on 2/18 for 1500 cc


- Continue Bumex 2 mg IV BID at this time - plan for oral conversion on D/C as 

a total of 120 mg daily of Lasix was previously used as outpatient with minimal 

overall benefit


- Continue to trend kidney function - minimal change at this time and can 

tolerate further diuresis


- Cardiology following - appreciate recommendations - no compromise with 

pericardial effusion and will monitor at this time


- Pulmonology followed for thoracentesis





A Flutter/A Fib: Rate Controlled


- Will not restart Toprol XL as above per cardiology


- Continue Eliquis 5 mg BID 





RLE Cellulitis: RESOLVED - Likely component of chronic venous stasis


- Course of Abx completed 





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


- Plan to D/C Lisinopril on D/C


- Follow BMP and avoid nephrotoxins; continue renally dosed medications; no 

drastic change even with IV diuresis





Chronic Progressive Hip Pain: Trochanteric Bursitis S/P Injection: RESOLVED





Hypercalcemia/Hyperparathyroidism with H/O Parathyroid Adenoma: STABLE


- Has had parathyroid adenoma resection x 2 - can follow-up as outpatient


- Bumex should help with hypercalcemia





Code Status: FULL RESUSCITATION





DVT Prophylaxis: Eliquis 





Disposition:


- Continued diuresis


Continued [facility] stay due to:  multiple IV medications needed


Discharge planning:  skilled nursing facility


 (Norma Garcia, PA-C)


Attending Attestation:


Pt seen/examined, chart reviewed, care plan d/w ANTONIO Garcia.


I agree w/ the smith components of her documentation.





Had dyspnea with getting out of bed today


offers no new complaints otherwise


no orthopnea





VSS


no fever


weight continues to decrease 





gen - NAD


neck - JVD present but improved


heart - irregular, s1, s2


lungs - bibasilar fine rales improved


abd - still with swelling/probable ascites


 - scrotum with edema but improved


ext - still with 2-3+ edema to the thighs b/l





BMP - stable/acceptable





A/P:


1.  acute/chronic diastolic CHF - improving slowly.  Cont IV bumex BID; daily 

BMP; daily weight. 


2.  left-sided pleural effusion s/p thoracentesis - fluid was transudative from 

CHF.  Marked symptomatic improvement. 


3.  hyponatremia - 2nd to diuresis.  Stable.  Daily BMP.


4.  left hip bursitis - s/p steroid injection - improved. 


5.  hypercalcemia 2nd to hyperparathyroidism - stable. 


6.  CKD stage 3 - creatinine stable.  


7.  a flutter - rates controlled/stable.  Eliquis.





continue to diurese


once we see creatinine rise can likely d/c to SNF on oral bumex





KAIN GAN MD


 (Zheng Gan MD)

## 2018-02-23 VITALS
HEART RATE: 60 BPM | TEMPERATURE: 97.34 F | DIASTOLIC BLOOD PRESSURE: 50 MMHG | OXYGEN SATURATION: 91 % | SYSTOLIC BLOOD PRESSURE: 110 MMHG

## 2018-02-23 VITALS
TEMPERATURE: 97.52 F | HEART RATE: 55 BPM | OXYGEN SATURATION: 93 % | DIASTOLIC BLOOD PRESSURE: 76 MMHG | SYSTOLIC BLOOD PRESSURE: 147 MMHG

## 2018-02-23 VITALS
HEART RATE: 59 BPM | DIASTOLIC BLOOD PRESSURE: 73 MMHG | SYSTOLIC BLOOD PRESSURE: 134 MMHG | TEMPERATURE: 97.7 F | OXYGEN SATURATION: 93 %

## 2018-02-23 VITALS — OXYGEN SATURATION: 91 %

## 2018-02-23 LAB
BUN SERPL-MCNC: 40 MG/DL (ref 7–18)
CALCIUM SERPL-MCNC: 10.5 MG/DL (ref 8.5–10.1)
CO2 SERPL-SCNC: 32 MMOL/L (ref 21–32)
CREAT SERPL-MCNC: 1.34 MG/DL (ref 0.6–1.4)
EOSINOPHIL NFR BLD AUTO: 114 K/UL (ref 130–400)
GLUCOSE SERPL-MCNC: 98 MG/DL (ref 70–99)
HCT VFR BLD CALC: 43.7 % (ref 42–52)
HGB BLD-MCNC: 15 G/DL (ref 14–18)
MCH RBC QN AUTO: 31.6 PG (ref 25–34)
MCHC RBC AUTO-ENTMCNC: 34.3 G/DL (ref 32–36)
MCV RBC AUTO: 92 FL (ref 80–100)
PMV BLD AUTO: 9.8 FL (ref 7.4–10.4)
POTASSIUM SERPL-SCNC: 4 MMOL/L (ref 3.5–5.1)
RED CELL DISTRIBUTION WIDTH CV: 16.5 % (ref 11.5–14.5)
RED CELL DISTRIBUTION WIDTH SD: 55.6 FL (ref 36.4–46.3)
SODIUM SERPL-SCNC: 132 MMOL/L (ref 136–145)
WBC # BLD AUTO: 6.52 K/UL (ref 4.8–10.8)

## 2018-02-23 RX ADMIN — ACETAMINOPHEN PRN MG: 325 TABLET ORAL at 19:51

## 2018-02-23 RX ADMIN — TAMSULOSIN HYDROCHLORIDE SCH MG: 0.4 CAPSULE ORAL at 19:51

## 2018-02-23 RX ADMIN — PANTOPRAZOLE SCH MG: 40 TABLET, DELAYED RELEASE ORAL at 07:41

## 2018-02-23 RX ADMIN — STANDARDIZED SENNA CONCENTRATE AND DOCUSATE SODIUM SCH TAB: 8.6; 5 TABLET ORAL at 19:51

## 2018-02-23 RX ADMIN — FINASTERIDE SCH MG: 5 TABLET, FILM COATED ORAL at 07:41

## 2018-02-23 RX ADMIN — APIXABAN SCH MG: 2.5 TABLET, FILM COATED ORAL at 07:41

## 2018-02-23 RX ADMIN — APIXABAN SCH MG: 2.5 TABLET, FILM COATED ORAL at 19:51

## 2018-02-23 RX ADMIN — BUMETANIDE SCH MLS/MIN: 0.25 INJECTION INTRAMUSCULAR; INTRAVENOUS at 16:51

## 2018-02-23 RX ADMIN — POTASSIUM CHLORIDE SCH MEQ: 1500 TABLET, EXTENDED RELEASE ORAL at 07:41

## 2018-02-23 RX ADMIN — STANDARDIZED SENNA CONCENTRATE AND DOCUSATE SODIUM SCH TAB: 8.6; 5 TABLET ORAL at 07:41

## 2018-02-23 RX ADMIN — BUMETANIDE SCH MLS/MIN: 0.25 INJECTION INTRAMUSCULAR; INTRAVENOUS at 07:42

## 2018-02-23 NOTE — PROGRESS NOTE
Subjective


Date of Service:


Feb 23, 2018.


Subjective


Pt evaluation today including:  conversation w/ patient, conversation w/ family 

(wife at bedside), physical exam, chart review, lab review


Pain:  none voiced


PO Intake:  normal


Voiding:   incontinence (some)


feels good, offers no complaints of orthopnea or dyspnea at rest


no cough 


still with significant edema of legs





Problem List


Medical Problems:


(1) CHF exacerbation


Status: Acute  





(2) Elevated troponin


Status: Acute  











Review of Systems


Constitutional:  No fever


Cardiac:  No chest pain


Abdomen:  No pain





Objective


Vital Signs











  Date Time  Temp Pulse Resp B/P (MAP) Pulse Ox O2 Delivery O2 Flow Rate FiO2


 


2/23/18 19:34      Room Air  


 


2/23/18 16:00      Room Air  


 


2/23/18 14:45 36.5 59 16 134/73 (93) 93 Room Air  


 


2/23/18 08:43     91 Room Air  


 


2/23/18 08:01 36.3 60 14 110/50 (70) 91 Room Air  


 


2/23/18 08:00      Room Air  


 


2/23/18 00:25      Room Air  


 


2/23/18 00:25 36.4 55 20 147/76 (99) 93 Room Air  











Physical Exam


General Appearance:  no apparent distress


ENT:  pharynx normal


Neck:  + JVD


Respiratory/Chest:  no respiratory distress, no accessory muscle use, + rales (

minimal bases)


Cardiovascular:  no gallop, + irregularly irregular


Abdomen:  normal bowel sounds, non tender, soft, no organomegaly, + distended (

mild ascites)


Extremities:  + pedal edema, + swelling (3-4+ edema b/l - surprisingly no 

change today)


Neurologic/Psychiatric:  alert, oriented x 3


Skin:  + pertinent finding (mild stasis changes b/l shins)





Laboratory Results





Last 24 Hours








Test


  2/23/18


06:05


 


White Blood Count 6.52 K/uL 


 


Red Blood Count 4.75 M/uL 


 


Hemoglobin 15.0 g/dL 


 


Hematocrit 43.7 % 


 


Mean Corpuscular Volume 92.0 fL 


 


Mean Corpuscular Hemoglobin 31.6 pg 


 


Mean Corpuscular Hemoglobin


Concent 34.3 g/dl 


 


 


RDW Standard Deviation 55.6 fL 


 


RDW Coefficient of Variation 16.5 % 


 


Platelet Count 114 K/uL 


 


Mean Platelet Volume 9.8 fL 


 


Sodium Level 132 mmol/L 


 


Potassium Level 4.0 mmol/L 


 


Chloride Level 93 mmol/L 


 


Carbon Dioxide Level 32 mmol/L 


 


Anion Gap 7.0 mmol/L 


 


Blood Urea Nitrogen 40 mg/dl 


 


Creatinine 1.34 mg/dl 


 


Est Creatinine Clear Calc


Drug Dose 39.6 ml/min 


 


 


Estimated GFR (


American) 54.8 


 


 


Estimated GFR (Non-


American 47.3 


 


 


BUN/Creatinine Ratio 29.7 


 


Random Glucose 98 mg/dl 


 


Calcium Level 10.5 mg/dl 


 


Magnesium Level 2.3 mg/dl 











Assessment and Plan


86yo male with: 


1.  acute/chronic diastolic CHF - improving slowly.  Cont IV bumex BID today; 

hold IV diuretics after tonight's dose as BUN & Cr heide slightly in the last 24 

hours; daily BMP; daily weight.  Beta blocker has been held due to previous 

bradycardia in setting of slow a. flutter. 


2.  left-sided pleural effusion s/p thoracentesis - fluid was transudative from 

CHF.  He had excellent results with the thoracentesis. 


3.  hyponatremia - 2nd to diuresis.  Stable.  Daily BMP.


4.  left hip bursitis - s/p steroid injection - improved/resolved. 


5.  hypercalcemia 2nd to hyperparathyroidism - stable. 


6.  CKD stage 3 - creatinine stable.  


7.  a flutter - rates controlled/stable.  Eliquis for anticoagulation. 


8.  deconditioning - PT, OT; rehab at [facility].


9.  thrombocytopenia - uncertain etiology; b12/folate wnl; follow. 


10.  HTN - controlled. 


11.  BPH - flomax; voiding adequately. 


12.  large pericardial effusion - chronic; no tamponade physiology on recent 

echo; cardiology following. 





wife updated


still with significant hypervolemia on exam 


continue IV diuresis today


Continued [facility] stay due to:  multiple IV medications needed


Discharge planning:  skilled nursing facility

## 2018-02-24 VITALS
DIASTOLIC BLOOD PRESSURE: 67 MMHG | TEMPERATURE: 97.88 F | HEART RATE: 64 BPM | OXYGEN SATURATION: 91 % | SYSTOLIC BLOOD PRESSURE: 124 MMHG

## 2018-02-24 VITALS
SYSTOLIC BLOOD PRESSURE: 122 MMHG | TEMPERATURE: 97.34 F | HEART RATE: 54 BPM | OXYGEN SATURATION: 93 % | DIASTOLIC BLOOD PRESSURE: 65 MMHG

## 2018-02-24 VITALS — OXYGEN SATURATION: 93 %

## 2018-02-24 VITALS
HEART RATE: 54 BPM | OXYGEN SATURATION: 93 % | TEMPERATURE: 97.34 F | SYSTOLIC BLOOD PRESSURE: 122 MMHG | DIASTOLIC BLOOD PRESSURE: 65 MMHG

## 2018-02-24 LAB
BUN SERPL-MCNC: 42 MG/DL (ref 7–18)
CALCIUM SERPL-MCNC: 10.7 MG/DL (ref 8.5–10.1)
CO2 SERPL-SCNC: 36 MMOL/L (ref 21–32)
CREAT SERPL-MCNC: 1.32 MG/DL (ref 0.6–1.4)
GLUCOSE SERPL-MCNC: 90 MG/DL (ref 70–99)
POTASSIUM SERPL-SCNC: 4.1 MMOL/L (ref 3.5–5.1)
SODIUM SERPL-SCNC: 133 MMOL/L (ref 136–145)

## 2018-02-24 RX ADMIN — APIXABAN SCH MG: 2.5 TABLET, FILM COATED ORAL at 07:30

## 2018-02-24 RX ADMIN — ACETAMINOPHEN PRN MG: 325 TABLET ORAL at 07:31

## 2018-02-24 RX ADMIN — FINASTERIDE SCH MG: 5 TABLET, FILM COATED ORAL at 07:30

## 2018-02-24 RX ADMIN — PANTOPRAZOLE SCH MG: 40 TABLET, DELAYED RELEASE ORAL at 07:30

## 2018-02-24 RX ADMIN — POTASSIUM CHLORIDE SCH MEQ: 1500 TABLET, EXTENDED RELEASE ORAL at 07:30

## 2018-02-24 RX ADMIN — STANDARDIZED SENNA CONCENTRATE AND DOCUSATE SODIUM SCH TAB: 8.6; 5 TABLET ORAL at 07:30

## 2018-02-24 NOTE — DISCHARGE SUMMARY
Discharge Summary


Date of Service


2018.





Discharge Summary


Admission Date:


2018 at 21:50


Discharge Date:  2018


Discharge Disposition:  Skilled nursing facility


Principal Diagnosis:  CHF exacerbation


Problems/Secondary Diagnoses:


Acute on chronic diastolic CHF


bilateral pleural effusions


Chronic large pericardial effusion- no tamponade 


Hyponatremia secondary to diuresis


A. flutter


A fib


RLE Cellulitis


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


L trochanteric bursitis 


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


BPH


Immunizations:  


   Have You Had Influenza Vaccine:  No


   History of Tetanus Vaccine?:  Unknown


   History of Pneumococcal:  Yes


   History of Hepatitis B Vaccine:  Unknown


Procedures:


CHEST ONE VIEW PORTABLE





CLINICAL HISTORY:  Evaluate Fever/Sepsis dyspnea





COMPARISON STUDY:  10/2/2017





FINDINGS: Interval large left pleural effusion. Smaller right pleural effusion.


Cardiac enlargement. Congestive heart failure. 





IMPRESSION:  Congestive heart failure. Large left as well as a small right


pleural effusion.











The above report was generated using voice recognition software.  It may contain


grammatical, syntax or spelling errors.














Electronically signed by:  Luis Fenton M.D.


2018 6:44 PM





Dictated Date/Time:  2018 6:44 PM





 The status of this report is Signed.   


 Draft = Not yet reviewed or approved by Radiologist.  


 Signed = Reviewed and approved by Radiologist.





CHEST ONE VIEW PORTABLE





HISTORY:      Dyspnea; Pleural Effusion





COMPARISON: Chest 2018.





FINDINGS: Small right and moderate left pleural effusions, unchanged. Pulmonary


vascular congestion has improved. Bibasilar densities persist. The heart remains


enlarged. No pneumothorax.





IMPRESSION:





1. No change in the small right and moderate left pleural effusions.


2. Pulmonary vascular congestion has improved.











Electronically signed by:  Faustino Kerns M.D.


2018 6:08 PM





Dictated Date/Time:  2018 6:07 PM





 The status of this report is Signed.   


 Draft = Not yet reviewed or approved by Radiologist.  


 Signed = Reviewed and approved by Radiologist.





BILATERAL LOWER EXTREMITY VENOUS DOPPLER





CLINICAL HISTORY: Lower extremity swelling.    





COMPARISON STUDY:  Right lower extremity venous Doppler 2015. 





TECHNIQUE:  Sonography of the deep venous system of the bilateral lower


extremities was performed. Compression and augmentation were evaluated.





FINDINGS:  The bilateral common femoral, superficial femoral and popliteal veins


were compressible. Augmentation was normal. Flow was shown within the deep calf


vessels. Bilateral lower extremity edema was noted.





IMPRESSION: No evidence of deep venous thrombus within the bilateral lower


extremities.











Electronically signed by:  Edward Zaragoza M.D.


2018 9:11 PM





Dictated Date/Time:  2018 9:10 PM





 The status of this report is Signed.   


 Draft = Not yet reviewed or approved by Radiologist.  


 Signed = Reviewed and approved by Radiologist.





Interpretation Summary


* Name: MARIE RENO  Study Date: 2018 11:08 AM  BP: 93/55 mmHg


* MRN: [id number]  Patient Location: C.2T\S\S230\S\2  HR: 68


* : 1931 (M/d/yyyy)  Gender: Male  Height: 67 in


* Age: 87 yrs  Ethnicity: [place]  Weight: 180 lb


* Ordering Physician: Kocher, Christopher


* Referring Physician: Self, Referred


* Performed By: [facility]


* Accession# [id number]  Account# [id number]


* Reason For Study: CHF


* BSA: 1.9 m2


* -- Conclusions --


* 1. Small LV cavity size.  Severe concentric LVH.


* 2. Normal LV systolic function.  LVEF 65-70%. No regional wall motion 

abnormalities.


* 3. Normal RV size and function.


* 4. Mild-to-moderate aortic regurgitation.


* 5. Grade II diastolic dysfunction


* 6. Borderline PH.  Estimated PASP 35-40 mmHg.  Dilated IVC, estimated RA 15 

mmHg.


* 7. Large circumferential pericardial effusion without evidence of tamponade.


* 8. Moderate pleural effusion.


* 9. Compared with prior study on 2017:  Pericardial effusion has 

slightly increased in size.


Procedure Details


* A complete two-dimensional transthoracic echocardiogram was performed (2D, M-

mode, Doppler and color flow Doppler).


Left Ventricle


* The left ventricular cavity is small.


* There is severe concentric left ventricular hypertrophy.


* Ejection Fraction = 65-70%.


Right Ventricle


* The right ventricle is grossly normal size.


* The right ventricular systolic function is normal as assessed by tricuspid 

annular plane systolic excursion (TAPSE) (normal >1.5 cm).


Atria


* The left atrium is moderately dilated.


* The right atrium is moderately dilated.


* Doppler suggests left to right interatrial shunt.


Mitral Valve


* The mitral valve is grossly normal.


* There is no mitral valve stenosis.


* There is trace mitral regurgitation.


Tricuspid Valve


* There is trace tricuspid regurgitation.


Aortic Valve


* The aortic valve opens well.


* The aortic valve is trileaflet.


* No hemodynamically significant valvular aortic stenosis.


* Mild to moderate aortic regurgitation.


Pulmonic Valve


* The pulmonary valve is inadequately visualized, but the Doppler data is 

adequate for interpretation.


* Pulmonic stenosis is absent.


* Trace pulmonic valvular regurgitation.


Great Vessels


* The aortic root and proximal ascending aorta are normal sized.


Pericardium/Pleural


* Large pericardial effusion.


* There is no diastolic compression of the right ventricle to suggest cardiac 

tamponade.


* There are no echocardiographic indications of cardiac tamponade.


* A circumferential pericardial effusion is noted.


* A hematoma/mass is seen in the pericardial space.


* Moderate size left pleural effusion.


Great Vessels


* Dilated inferior vena cava with reduced collapsability with sniff indicates 

an elevated right atrial pressure of 15 mmHg


Left Ventricular Diastolic Function


* Diastolic dysfunction, Grade II (pseudonormalization pattern).





Procedure Note


Date of Service


2018.





Procedure Note


Procedures: Left sided Thoracentesis


Consent: obtained via the patient and placed into the chart


Pre-Procedural Dx: Bilateral Pleural Effusions


Post-Procedural Dx: Bilateral Pleural Effusions





Analgesia:


   8cc of 1% Liquid Lidocaine





Procedure:


The patient was placed in an upright position and thoracic US was used to 

select a spot for the procedure.  A spot along the posterior axillary line was 

marked in the 7th intercostal space.  The patient was then draped and prepped 

in a sterile fashion.  A modified Seldinger technique was then used for 

catheter placement.  Flowing this approximately 1500cc of tan pleural  fluid 

was removed.  The patient was then cleaned and placed at a 60 degree angle in 

the bed were the US was used to evaluate for possible pneumothorax.  The US 

showed good lung sliding and starry night sign.  





EBL: none





Complications:  none











<Electronically signed by Herrera Baxter MD>





  Signed:  18 0854


  Signed:  





The status of this report is Signed


* If report status is Draft, the document has not been finalized by the 

responsible provider.  














PROCEDURE:  After obtaining verbal consent and following a time-out indicating 

the left hip to be injected with cortisone into the trochanteric bursae.  The 

point of maximal tenderness about the greater trochanter was identified and 

marked.  The area was prepped with Betadine, followed by an alcohol wipe.  Then

, a mixture of 2 mL of 1% lidocaine plain plus 2 mL of 0.5% Bupivacaine plain 

plus 1 mL of 40 mg of Depo-Medrol were easily injected into the right 

trochanteric bursae.  The patient tolerated this well and will follow all of my 

above instructions.














<Electronically signed by Allen Curry MD>





  Signed:  18 0237


  Signed:  





The status of this report is Signed


* If report status is Draft, the document has not been finalized by the 

responsible provider.  





Consultations:


Cardiology- Dr. Kocher 


Pulmonary- Dr. Baxter 


Orthopedics- Dr. Curry/Dr. Remy





Medication Reconciliation


New Medications:  


Bumetanide (Bumetanide) 1 Mg Tab


2 MG PO BID17 for 14 Days, #56 TAB





 


Continued Medications:  


Albuterol Sulf (Proventil 0.083% 2.5MG/3ML) 2.5 Mg/3 Ml Nebu


2.5 MG INH QID PRN for AS NEEDED





Apixaban (Eliquis) 5 Mg Tab


5 MG PO BID, TAB





Cholecalciferol (Vitamin D3) 400 Unit Cap


400 INTUNIT PO HS





Finasteride (Finasteride) 5 Mg Tab


5 MG PO QAM





Glucosamine Sulfate (Glucosamine) 1,000 Mg Tab


1000 MG PO BID, TAB





Ocuvite Preservision (Ocuvite Preservision) 1 Tab Tab


1 TAB PO BID, TAB





Pantoprazole (Protonix) 40 Mg Tab


40 MG PO DAILY, TAB





Potassium Ext Rel (Klor-Con) 20 Meq Tabcr


20 MEQ PO DAILY, TAB





Senna/Docusate Sod (Senokot S) 1 Tab Tab


1 TAB PO BID, TAB





Tamsulosin Hcl (Flomax) 0.4 Mg Cap


0.4 MG PO HS, CAP





Tramadol (Ultram) 50 Mg Tab


50 MG PO Q6H PRN for Pain for 3 Days, #12 TAB (This prescription has been 

renewed)





 


Discontinued Medications:  


Furosemide (Lasix) 40 Mg Tab


2 TAB PO QAM, TAB





Furosemide (Lasix) 40 Mg Tab


40 MG PO QPM, TAB





Lisinopril (Zestril) 10 Mg Tab


10 MG PO BID





Metoprolol Succinate (Metoprolol Succinate ER) 25 Mg Tabcr


0.5 TAB PO BID











Referrals At Discharge


Follow up Referrals:  


Cardiologist Referral - First Available with Kocher, Christopher W., MD








Discharge Exam


Review of Systems:  


   Constitutional:  No fever, No chills, No sweats, No weakness, No fatigue


   Eyes:  No worsening of vision


   ENT:  No hearing loss


   Respiratory:  No cough, No shortness of breath, No hemoptysis


   Cardiovascular:  No chest pain, No edema, No palpitations


   Abdomen:  No pain, No nausea, No vomiting, No diarrhea, No constipation


   Musculoskeletal:  No joint pain, No swelling, No calf pain


   Genitourinary - Male:  No hematuria, No dysuria


   Neurologic:  No weakness, No numbness/tingling


   Psychiatric:  No depression symptoms, No anxiety


   Endocrine:  No fatigue


   Hematologic / Lymphatic:  No abnormal bleeding/bruising


   Integumentary:  No rash, No itch, No new/changing skin lesions


Physical Exam:  


   General Appearance:  no apparent distress


   Eyes:  normal inspection, PERRL


   ENT:  hearing grossly normal


   Neck:  supple


   Respiratory/Chest:  no respiratory distress, no accessory muscle use, + 

crackles (bilateral lung bases )


   Cardiovascular:  + irregularly irregular (rate controlled )


   Abdomen / GI:  normal bowel sounds, non tender, + distended


   Extremities:  no calf tenderness, + swelling (+2-3 pitting edema of 

bilateral lower extremities )


   Neurologic/Psychiatric:  alert, normal mood/affect, oriented x 3


   Skin:  normal color, warm/dry, no rash





Hospital Course


Patient is an 87-year-old male with a history of chronic diastolic CHF, 

permanent AF on anticoagulation with Eliquis, large pericardial effusion, HTN, 

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

, who presents with acute on chronic diastolic CHF with anasarca and enlarging 

bilateral left greater than right pleural effusions. Also with RLE cellulitis.





Acute on chronic diastolic CHF:


- Treated w/ IV Bumex- discharge on Bumex 2 mg BID 


- Monitored I&Os- negative 8L  


- Continue to monitor daily weights- weight 80 kg at discharge 


- Repeat CMP and mag level in 3 days at [facility]


- f/u with cardiology/CHF clinic no later than Wednesday, 





Bilateral pleural effusions secondary to CHF s/p L-sided thoracentesis by Dr. Baxter on 





Chronic large pericardial effusion- no tamponade physiology on recent echo 





Hyponatremia secondary to diuresis- STABLE  





A. flutter, A fib: 


- Rates controlled, Metoprolol discontinued by cardiology 


- Continue Eliquis 5 mg BID 





RLE Cellulitis- RESOLVED: Course of abx completed 





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 





L trochanteric bursitis s/p injection by Dr. Curry on - RESOLVED: f/u w/ 

orthopedics as needed 





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 

STABLE: Continue outpatient f/u 





BPH: Continue Flomax and Proscar 





DVT Prophylaxis: Eliquis 





Code status: LEVEL I, FULL 





Disposition: Discharge to [facility] 





-----------------------------------------





Attending Attestation & Discharge Note:


Pt seen/examined, chart reviewed, care plan d/w ANTONIO Norris on day of 

discharge.


I agree w/ the key components of her discharge summary. 





86yo male with chronic diastolic CHF, a. fib, CKD stage 3, BPH, HTN, and 

hyperparathyroidism who presented with worsening weight gain, edema, and 

shortness of breath.


Upon presentation he was in marked acute/chronic diastolic CHF.  He also had a 

large left-sided pleural effusion.  His CHF had decompensated despite use of 

twice daily lasix.  


He also complained of severe left hip pain for several weeks-months. 





During the patient's stay he was aggressively diuresed.  Discharge weight was 

80kg (175 pounds).  A review of the outpatient medical record suggested that 

his baseline weight is about 170-175 pounds.  


Also during his hospitalization he had repeat echocardiogram showing preserved 

EF but grade 2 diastolic dysfunction along with a large pericardial effusion.  

The latter was w/o tamponade physiology.  


Dr. Baxter from pulmonary was consulted for his large left-sided pleural 

effusion and he performed thoracentesis yielding 1500cc of transudative fluid.  

Cultures were negative from the thoracentesis as were cytologies.  


With respect to his left hip it was felt he had trochanteric bursitis and he 

was given a steroid injection by orthopedics with resolution of his pain.  





Dr. Kocher from cardiology consulted and recommended discontinuation of his 

beta blocker due to significant bradycardia in the face of his a. fib/flutter.  

ACE was also discontinued due to low BPs and CKD stage 3.  





At discharge the patient has been transitioned to bumex 2mg twice daily.  


He will need repeat labs within 5 days of discharge to ensure stability of his 

creatinine & electrolytes. 





Discharge exam -


gen - NAD


neck - JVD resolved


heart - irregular, s1, s2, no murmur


lungs - mildly decreased BS both bases, minimal rales both bases, no wheeze


abd - probable mild ascites, NT, BS+


ext - 2+ edema b/l (marked improvement), pulses 2+ b/l 





Again, the following are recommended at discharge -


1.  bumex 2mg PO BID


2.  CMP, mag in 4-5 days for stability


3.  discontinuation of beta blocker and ACE


4.  daily weights


5.  salt/fluid restriction 


6.  follow-up with cardiology within 1 week 


7.  surveillance of his pericardial effusion; exact etiology of this is 

uncertain





Zheng Torres MD


Total Time Spent:  Greater than 30 minutes


This includes examination of the patient, discharge planning, medication 

reconciliation, and communication with other providers.





Discharge Instructions


Please refer to the electronic Patient Visit Report (Discharge Instructions) 

for additional information.





Follow-Up


Please follow-up with [facility] provider within 24-48 hours 





Follow-up w/ cardiology/CHF clinic by  





Please follow-up with Orthopedics (Dr. Ceballos) as needed for hip pain 





Please follow-up/keep all of your subspecialty appointments





Additional Copies To


[facility]; Kocher, Christopher W., MD; Herrera Baxter MD

## 2018-02-24 NOTE — DISCHARGE INSTRUCTIONS
Discharge Instructions


Date of Service


Feb 24, 2018.





Admission


Reason for Admission:  Cellulitis Of Right Lower Extremity, Chf Exacerbat





Discharge


Discharge Diagnosis / Problem:  CHF exacerbation





Discharge Goals


Goal(s):  Decrease discomfort, Improve function, Increase independence, Improve 

disease control, Learn about illness, Diagnostic testing, Therapeutic 

intervention, Prevent Disease Progression





Activity Recommendations


Activity Level:  Assistance Required


Therapies:  Physical Therapy, Occupational Therapy





.





Additional Information


Patient informed of condition:  Yes


Advance Directives:  Yes


DNR:  No


Level of Care:  Skilled


Communicable Disease:  No


Prognosis:  Stable





Instructions / Follow-Up


Instructions / Follow-Up


Acute on Chronic Diastolic CHF, bilateral pleural effusions, chronic large 

pericardial effusion w/out tamponade: 


- Bumex 2 mg BID 


- Repeat CMP and mag level in 3 days 


- Continue to monitor daily weights- weight 80 kg at discharge 





GABRIEL Ziegler fib: 


- Rates controlled, Metoprolol discontinued by cardiology 


- Continue Eliquis 5 mg BID 





RLE Cellulitis- RESOLVED: Course of abx completed 





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 





L trochanteric bursitis s/p injection by Dr. Curry on 2/18- RESOLVED: f/u w/ 

orthopedics as needed 





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 

STABLE: Continue outpatient f/u 





BPH: Continue Flomax and Proscar 





DVT Prophylaxis: Eliquis 





Code status: LEVEL I, FULL 





Disposition: Discharge to [facility] 





FOLLOW-UPS:





Please follow-up with [facility] provider within 24-48 hours 





Follow-up w/ cardiology/CHF clinic by Wednesday 2/28 





Please follow-up with Orthopedics (Dr. Ceballos) as needed for hip pain 





Please follow-up/keep all of your subspecialty appointments





Current Hospital Diet


Patient's current hospital diet: AHA Diet (Heart Healthy), Low Sodium Diet (2gm 

Na)





Discharge Diet


Recommended Diet:  AHA Diet (Heart Healthy), Low Sodium Diet (2gm Na)





Pending Studies


Studies pending at discharge:  no





Physician Orders On Transfer


Special Precautions:


Fall precautions


IV Therapy:


None


Vital Signs:


Routine


Weigh:


Daily- 80 kg at discharge


Additional Orders:


Repeat CMP and mag level in 3 days





Medical Emergencies








.


Who to Call and When:





Medical Emergencies:  If at any time you feel your situation is an emergency, 

please call 911 immediately.





.





Non-Emergent Contact


Non-Emergency issues call your:  Primary Care Provider


Call Non-Emergent contact if:  you have a fever, your pain is not controlled, 

your pain is worsening, your pain is unusual for you, your pain is concerning 

you, wound has increased drainage, wound has increased redness, wound has 

increased pain, you have any medication questions





.


.








"Provider Documentation" section prepared by Shelby Norris.








.





Core Measure Problem


Core Measures:  None

## 2018-03-05 ENCOUNTER — HOSPITAL ENCOUNTER (OUTPATIENT)
Dept: HOSPITAL 45 - C.LABCC | Age: 83
Discharge: HOME | End: 2018-03-05
Attending: INTERNAL MEDICINE
Payer: COMMERCIAL

## 2018-03-05 DIAGNOSIS — I50.9: Primary | ICD-10-CM

## 2018-03-05 LAB
BUN SERPL-MCNC: 32 MG/DL (ref 7–18)
CALCIUM SERPL-MCNC: 10.1 MG/DL (ref 8.5–10.1)
CO2 SERPL-SCNC: 33 MMOL/L (ref 21–32)
CREAT SERPL-MCNC: 1.16 MG/DL (ref 0.6–1.4)
GLUCOSE SERPL-MCNC: 99 MG/DL (ref 70–99)
POTASSIUM SERPL-SCNC: 3.2 MMOL/L (ref 3.5–5.1)
SODIUM SERPL-SCNC: 139 MMOL/L (ref 136–145)

## 2018-03-13 ENCOUNTER — HOSPITAL ENCOUNTER (OUTPATIENT)
Dept: HOSPITAL 45 - C.LABCC | Age: 83
Discharge: NURSING, REHAB OR LONG-TERM CARE | End: 2018-03-13
Attending: INTERNAL MEDICINE
Payer: COMMERCIAL

## 2018-03-13 DIAGNOSIS — E87.6: Primary | ICD-10-CM

## 2018-03-13 LAB
BUN SERPL-MCNC: 39 MG/DL (ref 7–18)
CALCIUM SERPL-MCNC: 11 MG/DL (ref 8.5–10.1)
CO2 SERPL-SCNC: 32 MMOL/L (ref 21–32)
CREAT SERPL-MCNC: 1.2 MG/DL (ref 0.6–1.4)
GLUCOSE SERPL-MCNC: 112 MG/DL (ref 70–99)
POTASSIUM SERPL-SCNC: 3.9 MMOL/L (ref 3.5–5.1)
SODIUM SERPL-SCNC: 136 MMOL/L (ref 136–145)

## 2018-03-19 ENCOUNTER — HOSPITAL ENCOUNTER (INPATIENT)
Dept: HOSPITAL 45 - C.EDC | Age: 83
LOS: 3 days | DRG: 291 | End: 2018-03-22
Attending: HOSPITALIST | Admitting: HOSPITALIST
Payer: COMMERCIAL

## 2018-03-19 VITALS
HEIGHT: 67.99 IN | HEIGHT: 67.99 IN | BODY MASS INDEX: 28.4 KG/M2 | WEIGHT: 187.39 LBS | BODY MASS INDEX: 28.4 KG/M2 | WEIGHT: 187.39 LBS

## 2018-03-19 VITALS — HEART RATE: 44 BPM | SYSTOLIC BLOOD PRESSURE: 119 MMHG | DIASTOLIC BLOOD PRESSURE: 60 MMHG

## 2018-03-19 VITALS — TEMPERATURE: 96.98 F

## 2018-03-19 DIAGNOSIS — K57.90: ICD-10-CM

## 2018-03-19 DIAGNOSIS — I48.92: ICD-10-CM

## 2018-03-19 DIAGNOSIS — Z51.5: ICD-10-CM

## 2018-03-19 DIAGNOSIS — I48.91: ICD-10-CM

## 2018-03-19 DIAGNOSIS — R62.7: ICD-10-CM

## 2018-03-19 DIAGNOSIS — Z66: ICD-10-CM

## 2018-03-19 DIAGNOSIS — R57.0: ICD-10-CM

## 2018-03-19 DIAGNOSIS — G93.41: ICD-10-CM

## 2018-03-19 DIAGNOSIS — E83.52: ICD-10-CM

## 2018-03-19 DIAGNOSIS — I50.33: Primary | ICD-10-CM

## 2018-03-19 DIAGNOSIS — Z86.61: ICD-10-CM

## 2018-03-19 LAB
ALBUMIN SERPL-MCNC: 3 GM/DL (ref 3.4–5)
ALP SERPL-CCNC: 113 U/L (ref 45–117)
ALT SERPL-CCNC: 41 U/L (ref 12–78)
AST SERPL-CCNC: 41 U/L (ref 15–37)
BASOPHILS # BLD: 0.01 K/UL (ref 0–0.2)
BASOPHILS NFR BLD: 0.1 %
BUN SERPL-MCNC: 57 MG/DL (ref 7–18)
CALCIUM SERPL-MCNC: 11.1 MG/DL (ref 8.5–10.1)
CO2 SERPL-SCNC: 27 MMOL/L (ref 21–32)
CREAT SERPL-MCNC: 1.95 MG/DL (ref 0.6–1.4)
EOS ABS #: 0.02 K/UL (ref 0–0.5)
EOSINOPHIL NFR BLD AUTO: 116 K/UL (ref 130–400)
GLUCOSE SERPL-MCNC: 107 MG/DL (ref 70–99)
HCT VFR BLD CALC: 50.1 % (ref 42–52)
HGB BLD-MCNC: 16.8 G/DL (ref 14–18)
IG#: 0.02 K/UL (ref 0–0.02)
IMM GRANULOCYTES NFR BLD AUTO: 16.6 %
LYMPHOCYTES # BLD: 1.41 K/UL (ref 1.2–3.4)
MCH RBC QN AUTO: 32 PG (ref 25–34)
MCHC RBC AUTO-ENTMCNC: 33.5 G/DL (ref 32–36)
MCV RBC AUTO: 95.4 FL (ref 80–100)
MONO ABS #: 1.16 K/UL (ref 0.11–0.59)
MONOCYTES NFR BLD: 13.7 %
NEUT ABS #: 5.87 K/UL (ref 1.4–6.5)
NEUTROPHILS # BLD AUTO: 0.2 %
NEUTROPHILS NFR BLD AUTO: 69.2 %
PMV BLD AUTO: 11 FL (ref 7.4–10.4)
POTASSIUM SERPL-SCNC: 4.8 MMOL/L (ref 3.5–5.1)
PROT SERPL-MCNC: 6.4 GM/DL (ref 6.4–8.2)
RED CELL DISTRIBUTION WIDTH CV: 17.4 % (ref 11.5–14.5)
RED CELL DISTRIBUTION WIDTH SD: 60.8 FL (ref 36.4–46.3)
SODIUM SERPL-SCNC: 133 MMOL/L (ref 136–145)
WBC # BLD AUTO: 8.49 K/UL (ref 4.8–10.8)

## 2018-03-19 RX ADMIN — MORPHINE SULFATE PRN MG: 2 INJECTION, SOLUTION INTRAMUSCULAR; INTRAVENOUS at 21:08

## 2018-03-19 NOTE — HISTORY AND PHYSICAL
History & Physical


Date & Time of Service:


Mar 19, 2018 at 18:03


Chief Complaint:


Failure To Thrive


Primary Care Physician:


Liliana Talavera M.D.


History of Present Illness


Source:  patient, family


87-year-old male just discharged from our facility  after problems 

from diastolic heart failure including large pleural effusions which required 

drainage, problems with pericardial effusion without tamponade pathophysiology, 

and atrial fib and flutter with refusal of pacemaker placement.  The patient 

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 

transferred to [facility] and eventually home.  Patient is 

declined significantly since his discharge having massive lower extremity 

swelling and now presenting with a variable block a flutter with heart rates 

between 30 and 50 and systolic blood pressures ranging between 70 and 90





The patient's daughter is at the bedside the patient himself is awake and alert 

after receiving is low dose of atropine he confirms she wishes to be on comfort 

care he understands that he might die he does not want up with his family 

through any further hardship and just wants things to be as peaceful as 

possible in his own words.  The daughter confirms that they have had plenty 

discussions regarding this and they are supportive of their father's decision





Past Medical/Surgical History


Medical Problems:


(1) Atrial fibrillation


(2) Cellulitis


(3) Cellulitis of right lower extremity


(4) CHF exacerbation


(5) CHF exacerbation


(6) Comfort measures only status


(7) Community acquired pneumonia


(8) Congestive Heart Failure Nos


(9) Diverticulosis Colon (W/O Ment Of Hemorrhage)


(10) Elevated troponin


(11) GI bleed


(12) Hyperlipidemia Nec/Nos


(13) Hypertension Nos


(14) Peritonitis Nos


(15) Pseudomonal Pneumonia


(16) Sepsis


(17) Shortness of breath


(18) Swelling of right extremity


(19) Viral Encephalitis Nos





Family History





Insignificant due to advanced age 





Social History


Smoking Status:  Never Smoker


Drug Use:  none


Marital Status:  


Housing status:  lives with family


Occupational Status:  retired





Immunizations


History of Influenza Vaccine:  No


History of Tetanus Vaccine?:  Unknown


History of Pneumococcal:  Yes


History of Hepatitis B Vaccine:  Unknown





Allergies


Coded Allergies:  


     No Known Allergies (Verified , 3/19/18)





Home Medications


Scheduled


Apixaban (Eliquis), 5 MG PO BID


Bumetanide (Bumetanide), 2 MG PO BID17


Cholecalciferol (Vitamin D3), 400 INTUNIT PO HS


Finasteride (Finasteride), 5 MG PO QAM


Glucosamine Sulfate (Glucosamine), 1,000 MG PO BID


Hydrocortisone (Topical) (Hydrocortisone), 1 APPLN TOP HS


Ocuvite Preservision (Ocuvite Preservision), 1 TAB PO BID


Pantoprazole (Protonix), 40 MG PO DAILY


Potassium Ext Rel (Klor-Con), 20 MEQ PO DAILY


Senna/Docusate Sod (Senokot S), 1 TAB PO BID


Tamsulosin Hcl (Flomax), 0.4 MG PO HS





Scheduled PRN


Albuterol Sulf (Proventil 0.083% 2.5MG/3ML), 2.5 MG INH QID PRN for AS NEEDED





Review of Systems


Constitutional:  + weakness, + fatigue


Respiratory:  + dyspnea on exertion, No cough, No sputum, No dyspnea at rest


Cardiovascular:  + orthopnea, + edema (+++), No chest pain


Abdomen:  + pain, No nausea, No vomiting, No diarrhea


Musculoskeletal:  No joint pain, No muscle pain


Neurologic:  + memory loss, + weakness


Psychiatric:  + depression symptoms, No anhedonism, No anxiety


Endocrine:  No fatigue, No excessive thirst


Integumentary:  No rash, No itch





Physical Exam


Vital Signs











  Date Time  Temp Pulse Resp B/P (MAP) Pulse Ox O2 Delivery O2 Flow Rate FiO2


 


3/19/18 17:26  45 19 108/54 100 Room Air  


 


3/19/18 17:06  45 16 69/50 100 Room Air  


 


3/19/18 16:49  43 24  100   


 


3/19/18 16:21  33 15 80/61 100 Room Air  


 


3/19/18 16:16  39      


 


3/19/18 15:02 36.1 37 20 101/58 100 Room Air  


 


3/19/18 15:02      Nasal Cannula 2.0 








General Appearance:  + mild distress, + thin


Eyes:  normal inspection, sclerae normal


Neck:  supple, no JVD


Respiratory/Chest:  chest non-tender, + decreased breath sounds


Cardiovascular:  + bradycardia, + irregularly irregular


Abdomen/GI:  normal bowel sounds, non tender, soft


Back:  normal inspection, no CVA tenderness, no muscle spasm


Extremities/Musculoskelatal:  no pedal edema, normal range of motion


Neurologic/Psych:  alert, oriented x 3


Skin:  normal color, warm/dry, no rash





Diagnostics


Laboratory Results





Results Past 24 Hours








Test


  3/19/18


16:13 Range/Units


 


 


White Blood Count 8.49 4.8-10.8  K/uL


 


Red Blood Count 5.25 4.7-6.1  M/uL


 


Hemoglobin 16.8 14.0-18.0  g/dL


 


Hematocrit 50.1 42-52  %


 


Mean Corpuscular Volume 95.4   fL


 


Mean Corpuscular Hemoglobin 32.0 25-34  pg


 


Mean Corpuscular Hemoglobin


Concent 33.5


  32-36  g/dl


 


 


Platelet Count 116 130-400  K/uL


 


Mean Platelet Volume 11.0 7.4-10.4  fL


 


Neutrophils (%) (Auto) 69.2  %


 


Lymphocytes (%) (Auto) 16.6  %


 


Monocytes (%) (Auto) 13.7  %


 


Eosinophils (%) (Auto) 0.2  %


 


Basophils (%) (Auto) 0.1  %


 


Neutrophils # (Auto) 5.87 1.4-6.5  K/uL


 


Lymphocytes # (Auto) 1.41 1.2-3.4  K/uL


 


Monocytes # (Auto) 1.16 0.11-0.59  K/uL


 


Eosinophils # (Auto) 0.02 0-0.5  K/uL


 


Basophils # (Auto) 0.01 0-0.2  K/uL


 


RDW Standard Deviation 60.8 36.4-46.3  fL


 


RDW Coefficient of Variation 17.4 11.5-14.5  %


 


Immature Granulocyte % (Auto) 0.2  %


 


Immature Granulocyte # (Auto) 0.02 0.00-0.02  K/uL


 


Sodium Level 133 136-145  mmol/L


 


Potassium Level 4.8 3.5-5.1  mmol/L


 


Chloride Level 97   mmol/L


 


Carbon Dioxide Level 27 21-32  mmol/L


 


Anion Gap 9.0 3-11  mmol/L


 


Blood Urea Nitrogen 57 7-18  mg/dl


 


Creatinine


  1.95


  0.60-1.40


mg/dl


 


Est Creatinine Clear Calc


Drug Dose 28.3


   ml/min


 


 


Estimated GFR (


American) 34.8


   


 


 


Estimated GFR (Non-


American 30.0


   


 


 


BUN/Creatinine Ratio 29.1 10-20  


 


Random Glucose 107 70-99  mg/dl


 


Calcium Level 11.1 8.5-10.1  mg/dl


 


Total Bilirubin 2.8 0.2-1  mg/dl


 


Direct Bilirubin  0-0.2  mg/dl


 


Aspartate Amino Transf


(AST/SGOT) 41


  15-37  U/L


 


 


Alanine Aminotransferase


(ALT/SGPT) 41


  12-78  U/L


 


 


Alkaline Phosphatase 113   U/L


 


Troponin I 0.332 0-0.045  ng/ml


 


Total Protein 6.4 6.4-8.2  gm/dl


 


Albumin 3.0 3.4-5.0  gm/dl


 


Chemistry Specimen Hemolysis   








other (large left pleural effusionj)


other (flutter bradycardia)





Impression


Assessment and Plan


87-year-old male who presents with recurrent diastolic heart failure with 

request for palliative care/comfort care services





Recent discharge from our facility after having thoracentesis for pleural 

effusions felt to be secondary to heart failure has a long-standing history of 

atrial fib flutter on chronic anticoagulation.  Patient currently has had what 

appears to be reaccumulation of his pleural effusion on chest x-ray and massive 

lower body swelling and leg swelling.  The patient is arousable and able to 

discuss his CODE STATUS which he confirms to be DNR and also relates to me that 

he wishes to pursue comfort care measures with the expectation he will likely 

 from his illnesses not wishing to pursue any interventional correction 

of his bradycardia and heart failure with the exception of some intravenous 

medicine such as the Bumex which he was prescribed previously





To this and will place him on the floor put him get a palliative care consult 

in the morning continue his Bumex oxygen initiate scopolamine intravenous 

morphine intravenous Ativan and place a Richard catheter to avoid urgency for 

urination





We will forego DVT prevention as the patient wishes to be on palliative care 

stopping his Eliquis and not instituting any subcutaneous chemoprophylaxis nor 

mechanical prophylaxis because of the discomfort and uncomfortable nature of 

SCDs for this patient





As mentioned above due to his BPH we will stop his Flomax and Proscar but place 

a Richard catheter





He will be offered a regular diet at this point in time





Will have a palliative care consult in case management consult in the morning 

to help us determine his disposition perhaps the family may be agreeable to him 

going home on hospice care





His hypercalcemia not be managed with intravenous fluids because of his volume 

status consideration for pamidronate or something similar may help if we are 

going to continue to treat him aggressively however at this point in time will 

forego treatment of hypercalcemia





Advanced Directives


Existing Advance Directive:  Yes


Existing Living Will:  Yes





Resuscitation Status








VTE Prophylaxis


Will order VTE Prophylaxis:  No


Reason for no VTE drug order:  Refusal of treatmnt by pt


Reason no Mechanical VTE Order:  Refusal of treatment by pt

## 2018-03-19 NOTE — DIAGNOSTIC IMAGING REPORT
ABDOMEN AND PELVIS CT WITHOUT CONTRAST



CT DOSE: 454.11 mGy.cm



HISTORY: Acute generalized abdominal pain with vomiting  abd pain w/ vomiting 



TECHNIQUE: Multiaxial CT images of the abdomen and pelvis were performed without

contrast.  A dose lowering technique was utilized adhering to the principles of

ALARA.



COMPARISON STUDY: CT 3/31/2016 and 1/9/2009.



FINDINGS: 

Partially imaged moderate bilateral pleural effusions with bibasilar

consolidation. There is a large pericardial effusion measuring up to 2.5 cm

posteriorly. Coronary arterial disease. The heart is moderately enlarged. No

pneumatosis or pneumoperitoneum identified.



Gallbladder is mildly distended. The spleen, and pancreas are unremarkable.

There is suggested mild marginal nodularity of the liver. Study is motion

degraded. Nodular thickening of the bilateral adrenal glands. Nonspecific

bilateral perinephric stranding. Exophytic 10 mm lesion of the superior pole

right kidney suggests renal cyst. Nonobstructing 5 mm calculus of the interpolar

left kidney. Punctate nonobstructing calculus of the inferior pole left kidney.

There is no ureteral calculi or obstructive uropathy. Ureters are unremarkable.

Moderate wall thickening of the bladder with prostamegaly. Extensive

atherosclerosis of the aorta with fusiform aneurysm dilation of the proximal

dominant aorta measuring 3.0 x 2.7 cm, image 154 series 3 which appears

unchanged. No evidence of aneurysm rupture. No pathologic adenopathy identified.



There is no bowel obstruction. There is moderate wall thickening of the distal

rectum. Mild colonic diverticulosis without definite CT evidence of acute

diverticulitis. The visualized appendix appears unremarkable. Partially

calcified 1.4 x 1.5 cm lesion is again noted involving the undersurface of the

greater curvature of the mid gastric body, unchanged. Mild to moderate abdominal

and pelvic ascites. Moderate diffuse body wall edema. The bones appear intact.

The bones appear moderately demineralized.



IMPRESSION: 



1. Findings compatible with fluid overload with moderate abdominal and pelvic

ascites , moderate diffuse body wall edema, moderate bilateral pleural effusions

with large pericardial effusion.

2. Mild marginal nodularity of the liver suggests cirrhosis.

3. Indeterminate moderate wall thickening of the rectum. Correlate with clinical

exam.

4. No bowel obstruction.

5. Prostamegaly with evidence of chronic bladder outlet obstruction.

6. Additional findings as above.







Electronically signed by:  Samuel Delatorre M.D.

3/19/2018 4:11 PM



Dictated Date/Time:  3/19/2018 4:02 PM

## 2018-03-19 NOTE — DIAGNOSTIC IMAGING REPORT
CHEST ONE VIEW PORTABLE



CLINICAL HISTORY: 87 years-old Male presenting with AMS. 



TECHNIQUE: Portable upright AP view of the chest was obtained.



COMPARISON: 2/16/2018.



FINDINGS:

The patient is MONROY rotated. Atherosclerosis of aortic arch. Cardiac silhouette

enlarged. Pulmonary vascular prominence. The left heart border is obscured due

to the moderate left pleural effusion and left basilar atelectasis. Persistent

right basilar opacity and small right pleural effusion. No new focal opacity.

Osseous structures normal. 



IMPRESSION:

1.  Persistent bibasilar consolidation with left greater than right pleural

effusions. This likely represents persistent and extensive atelectasis.



2.  Cardiomegaly with volume overload.







Electronically signed by:  Robb Price M.D.

3/19/2018 3:17 PM



Dictated Date/Time:  3/19/2018 3:16 PM

## 2018-03-19 NOTE — EMERGENCY ROOM VISIT NOTE
History


Report prepared by Gely:  Alba Spear


Under the Supervision of:  Dr. Nilo Ontiveros D.O.


First contact with patient:  14:51


Chief Complaint:  OTHER COMPLAINT


Stated Complaint:  FAILURE TO THRIVE





History of Present Illness


The patient is an 87 year old male who presents to the Emergency Room with 

persistent altered mental status starting this morning. The patient presents to 

the ED by EMS. The patient was admitted to the hospital from February 14-25 

with a right hip infection. He was discharged to [facility] for rehab. He 

returned home 4 days ago. The patient has been having diarrhea since returning 

home. At 0600 today, they found that he had diarrhea while in bed. This morning

, the patient seemed confused during breakfast. He is feeling cold. He reports 

feeling sick in the stomach. He vomited once 2 days ago. He denies any hip pain

, chest pain, SOB, abdominal pain, or pain with urination. He is currently not 

on antibiotics. He has had leg swelling for the past 7 months. He has a history 

of atrial fibrillation. He is on a blood thinner.





   Source of History:  patient, family


   Onset:  this morning


   Position:  other (constitutional)


   Quality:  other (altered mental status)


   Timing:  other (persistent)


   Associated Symptoms:  + nausea, + vomiting, + diarrhea, No chest pain, No SOB

, No abdominal pain, No urinary symptoms





Review of Systems


See HPI for pertinent positives & negatives. A total of 10 systems reviewed and 

were otherwise negative.





Past Medical & Surgical


Medical Problems:


(1) Atrial fibrillation


(2) Cellulitis of right lower extremity


(3) CHF exacerbation


(4) Comfort measures only status


(5) Community acquired pneumonia


(6) Congestive Heart Failure Nos


(7) Diverticulosis Colon (W/O Ment Of Hemorrhage)


(8) GI bleed


(9) Hyperlipidemia Nec/Nos


(10) Hypertension Nos


(11) Peritonitis Nos


(12) Pseudomonal Pneumonia


(13) Sepsis


(14) Shortness of breath


(15) Viral Encephalitis Nos








Family History





Insignificant due to advanced age 





Social History


Smoking Status:  Never Smoker


Alcohol Use:  none


Drug Use:  none


Marital Status:  


Housing Status:  lives with significant other


Occupation Status:  retired





Current/Historical Medications


Scheduled


Apixaban (Eliquis), 5 MG PO BID


Bumetanide (Bumetanide), 2 MG PO BID17


Cholecalciferol (Vitamin D3), 400 INTUNIT PO HS


Finasteride (Finasteride), 5 MG PO QAM


Glucosamine Sulfate (Glucosamine), 1,000 MG PO BID


Hydrocortisone (Topical) (Hydrocortisone), 1 APPLN TOP HS


Ocuvite Preservision (Ocuvite Preservision), 1 TAB PO BID


Pantoprazole (Protonix), 40 MG PO DAILY


Potassium Ext Rel (Klor-Con), 20 MEQ PO DAILY


Senna/Docusate Sod (Senokot S), 1 TAB PO BID


Tamsulosin Hcl (Flomax), 0.4 MG PO HS





Scheduled PRN


Albuterol Sulf (Proventil 0.083% 2.5MG/3ML), 2.5 MG INH QID PRN for AS NEEDED





Allergies


Coded Allergies:  


     No Known Allergies (Verified , 3/19/18)





Physical Exam


Vital Signs











  Date Time  Temp Pulse Resp B/P (MAP) Pulse Ox O2 Delivery O2 Flow Rate FiO2


 


3/19/18 17:26  45 19 108/54 100 Room Air  


 


3/19/18 17:06  45 16 69/50 100 Room Air  


 


3/19/18 16:49  43 24  100   


 


3/19/18 16:21  33 15 80/61 100 Room Air  


 


3/19/18 16:16  39      


 


3/19/18 15:02 36.1 37 20 101/58 100 Room Air  


 


3/19/18 15:02      Nasal Cannula 2.0 











Physical Exam


GENERAL: Ill appearing, sitting up in bed, falling asleep quickly.  


EYE EXAM: normal conjunctiva. 


OROPHARYNX: no exudate, no erythema, lips, buccal mucosa, and tongue normal and 

mucous membranes are moist


NECK: supple, no nuchal rigidity, no adenopathy, non-tender


LUNGS: Diminished at bilateral bases. Normal chest wall mechanics


HEART: bradycardic and irregular, no murmurs, S1 normal and S2 normal 


ABDOMEN: abdomen soft, non-tender, normo-active bowel sounds, no masses, no 

rebound or guarding. 


SKIN: no rashes and no bruising 


UPPER EXTREMITIES: upper extremities are grossly normal. 


LOWER EXTREMITIES: No pitting edema. Calves equal bilaterally. 


NEURO EXAM: Normal sensorium, cranial nerves II-XII grossly intact, normal 

speech,  no gross weakness of arms, no gross weakness of legs.





Medical Decision & Procedures


ER Provider


Diagnostic Interpretation:


Radiology results as stated below per my review and the radiologist's 

interpretation: 





CHEST ONE VIEW PORTABLE





CLINICAL HISTORY: 87 years-old Male presenting with AMS. 





TECHNIQUE: Portable upright AP view of the chest was obtained.





COMPARISON: 2/16/2018.





FINDINGS:


The patient is MONROY rotated. Atherosclerosis of aortic arch. Cardiac silhouette


enlarged. Pulmonary vascular prominence. The left heart border is obscured due


to the moderate left pleural effusion and left basilar atelectasis. Persistent


right basilar opacity and small right pleural effusion. No new focal opacity.


Osseous structures normal. 





IMPRESSION:


1.  Persistent bibasilar consolidation with left greater than right pleural


effusions. This likely represents persistent and extensive atelectasis.





2.  Cardiomegaly with volume overload.





Electronically signed by:  Robb Price M.D.


3/19/2018 3:17 PM





Dictated Date/Time:  3/19/2018 3:16 PM








ABDOMEN AND PELVIS CT WITHOUT CONTRAST





CT DOSE: 454.11 mGy.cm





HISTORY: Acute generalized abdominal pain with vomiting  abd pain w/ vomiting 





TECHNIQUE: Multiaxial CT images of the abdomen and pelvis were performed without


contrast.  A dose lowering technique was utilized adhering to the principles of


ALARA.





COMPARISON STUDY: CT 3/31/2016 and 1/9/2009.





FINDINGS: 


Partially imaged moderate bilateral pleural effusions with bibasilar


consolidation. There is a large pericardial effusion measuring up to 2.5 cm


posteriorly. Coronary arterial disease. The heart is moderately enlarged. No


pneumatosis or pneumoperitoneum identified.





Gallbladder is mildly distended. The spleen, and pancreas are unremarkable.


There is suggested mild marginal nodularity of the liver. Study is motion


degraded. Nodular thickening of the bilateral adrenal glands. Nonspecific


bilateral perinephric stranding. Exophytic 10 mm lesion of the superior pole


right kidney suggests renal cyst. Nonobstructing 5 mm calculus of the interpolar


left kidney. Punctate nonobstructing calculus of the inferior pole left kidney.


There is no ureteral calculi or obstructive uropathy. Ureters are unremarkable.


Moderate wall thickening of the bladder with prostamegaly. Extensive


atherosclerosis of the aorta with fusiform aneurysm dilation of the proximal


dominant aorta measuring 3.0 x 2.7 cm, image 154 series 3 which appears


unchanged. No evidence of aneurysm rupture. No pathologic adenopathy identified.





There is no bowel obstruction. There is moderate wall thickening of the distal


rectum. Mild colonic diverticulosis without definite CT evidence of acute


diverticulitis. The visualized appendix appears unremarkable. Partially


calcified 1.4 x 1.5 cm lesion is again noted involving the undersurface of the


greater curvature of the mid gastric body, unchanged. Mild to moderate abdominal


and pelvic ascites. Moderate diffuse body wall edema. The bones appear intact.


The bones appear moderately demineralized.





IMPRESSION: 





1. Findings compatible with fluid overload with moderate abdominal and pelvic


ascites , moderate diffuse body wall edema, moderate bilateral pleural effusions


with large pericardial effusion.


2. Mild marginal nodularity of the liver suggests cirrhosis.


3. Indeterminate moderate wall thickening of the rectum. Correlate with clinical


exam.


4. No bowel obstruction.


5. Prostamegaly with evidence of chronic bladder outlet obstruction.


6. Additional findings as above.





Electronically signed by:  Samuel Delatorre M.D.


3/19/2018 4:11 PM





Dictated Date/Time:  3/19/2018 4:02 PM





Laboratory Results


3/19/18 16:13








Red Blood Count 5.25, Mean Corpuscular Volume 95.4, Mean Corpuscular Hemoglobin 

32.0, Mean Corpuscular Hemoglobin Concent 33.5, Mean Platelet Volume 11.0, 

Neutrophils (%) (Auto) 69.2, Lymphocytes (%) (Auto) 16.6, Monocytes (%) (Auto) 

13.7, Eosinophils (%) (Auto) 0.2, Basophils (%) (Auto) 0.1, Neutrophils # (Auto

) 5.87, Lymphocytes # (Auto) 1.41, Monocytes # (Auto) 1.16, Eosinophils # (Auto

) 0.02, Basophils # (Auto) 0.01





3/19/18 16:13

















Test


  3/19/18


16:13


 


White Blood Count


  8.49 K/uL


(4.8-10.8)


 


Red Blood Count


  5.25 M/uL


(4.7-6.1)


 


Hemoglobin


  16.8 g/dL


(14.0-18.0)


 


Hematocrit 50.1 % (42-52) 


 


Mean Corpuscular Volume


  95.4 fL


()


 


Mean Corpuscular Hemoglobin


  32.0 pg


(25-34)


 


Mean Corpuscular Hemoglobin


Concent 33.5 g/dl


(32-36)


 


Platelet Count


  116 K/uL


(130-400)


 


Mean Platelet Volume


  11.0 fL


(7.4-10.4)


 


Neutrophils (%) (Auto) 69.2 % 


 


Lymphocytes (%) (Auto) 16.6 % 


 


Monocytes (%) (Auto) 13.7 % 


 


Eosinophils (%) (Auto) 0.2 % 


 


Basophils (%) (Auto) 0.1 % 


 


Neutrophils # (Auto)


  5.87 K/uL


(1.4-6.5)


 


Lymphocytes # (Auto)


  1.41 K/uL


(1.2-3.4)


 


Monocytes # (Auto)


  1.16 K/uL


(0.11-0.59)


 


Eosinophils # (Auto)


  0.02 K/uL


(0-0.5)


 


Basophils # (Auto)


  0.01 K/uL


(0-0.2)


 


RDW Standard Deviation


  60.8 fL


(36.4-46.3)


 


RDW Coefficient of Variation


  17.4 %


(11.5-14.5)


 


Immature Granulocyte % (Auto) 0.2 % 


 


Immature Granulocyte # (Auto)


  0.02 K/uL


(0.00-0.02)


 


Anion Gap


  9.0 mmol/L


(3-11)


 


Est Creatinine Clear Calc


Drug Dose 28.3 ml/min 


 


 


Estimated GFR (


American) 34.8 


 


 


Estimated GFR (Non-


American 30.0 


 


 


BUN/Creatinine Ratio 29.1 (10-20) 


 


Calcium Level


  11.1 mg/dl


(8.5-10.1)


 


Total Bilirubin


  2.8 mg/dl


(0.2-1)


 


Direct Bilirubin  mg/dl (0-0.2) 


 


Aspartate Amino Transf


(AST/SGOT) 41 U/L (15-37) 


 


 


Alanine Aminotransferase


(ALT/SGPT) 41 U/L (12-78) 


 


 


Alkaline Phosphatase


  113 U/L


()


 


Troponin I


  0.332 ng/ml


(0-0.045)


 


Total Protein


  6.4 gm/dl


(6.4-8.2)


 


Albumin


  3.0 gm/dl


(3.4-5.0)


 


Chemistry Specimen Hemolysis  





Laboratory results per my review.





Medications Administered











 Medications


  (Trade)  Dose


 Ordered  Sig/Rudy


 Route  Start Time


 Stop Time Status Last Admin


Dose Admin


 


 Sodium Chloride  1,000 ml @ 


 999 mls/hr  Q1H1M STAT


 IV  3/19/18 15:02


 3/19/18 16:02 DC 3/19/18 15:02


999 MLS/HR


 


 Atropine Sulfate


  (Atropine


 Sulfate 0.1mg/ml


 Inj)  0.5 mg  NOW  STAT


 IV  3/19/18 16:52


 3/19/18 16:53 DC 3/19/18 16:52


0.5 MG











ECG Per My Interpretation


Indication:  bradycardia


Rate (beats per minute):  37


Rhythm:  atrial flutter (with variable block)


Findings:  Q waves (Inferior, Septal), left axis deviation


Comparison ECG Date:  14-Feb-2018


Change:  no significant change





ED Course


ED COURSE: 


Vital signs were reviewed and showed bradycardia. 


The patients medical record was reviewed


The above diagnostic studies were performed and reviewed.


ED treatments and interventions as stated above. 





1452: The patient was evaluated in room C2B. A complete history and physical 

examination was performed.





1502: NSS 1000 ml @ 999 mls/hr IV. 





1648: I rediscussed with the family. Patient is DNR/DNI. 





1652: Atropine Sulfate 0.5 mg IV. 





1654: I discussed the patient's case with Dr. Cardoza [facility] cardiology. He 

states that if the patient does not want a pacemaker, there is not much else to 

offer. 





1658: I reevaluated the patient. Patient does not want a pacer. 





1715: I reviewed the patient's case with Dr. Martinez, [facility] hospitalist.  He 

will evaluate the patient for further management.





1719: Upon reevaluation, the patient's blood pressure is in the 60s. I 

discussed my findings with the patient's family and they understand and agree 

with the treatment plan.   


Based on the patients age, coexisting illnesses, exam and lab findings the 

decision to treat as an inpatient was made.


The patient remained stable while under my care.


The patient will be evaluated for further management.





Medical Decision


Differential diagnoses includes but is not limited to toxic, metabolic, 

infectious, traumatic, cardiac, neurologic, hematologic, psychiatric and 

inflammatory etiologies.





Patient is an 87-year-old male that presents to the ER for weakness associated 

with confusion since this morning.  .  Upon presentation he has atrial flutter 

with a variable block and a heart rate in the 30s.  Initial blood pressure is 

100 systolic.  His blood pressure dropped down to the 80s and then to the 60s.  

At this point he was given 1 of atropine IV.  I had multiple conversations with 

the family. Patient is a DNR/DNI who does not want a pacemaker or any central 

venous access to provide pressors.  Both him and his daughter are clear on his 

wishes.  We will not give any additional atropine or any vasopressors.  I did 

discuss with cardiology for any other recommendations.  Troponin is elevated 

likely to to the low blood pressure.  Creatinine was 1.5.  T bili was elevated 

as well at 2.8.  Patient family were updated at bedside.  Patient was admitted 

with a DISHA, elevated troponin hypotension and bradycardia all likely stemming 

from the bradycardia.





Medication Reconcilliation


Current Medication List:  was personally reviewed by me





Blood Pressure Screening


Patient's blood pressure:  Normal blood pressure


Blood pressure disposition:  Did not require urgent referral





Consults


Time Called:  1652


Consulting Physician:  Dr. Cardoza [facility] cardiology


Returned Call:  1654


I discussed the patient's case with him. He states that if the patient does not 

want a pacemaker, there is not much else to offer.


Additional Consults:  


   Time Called:  1705


   Consulted Physician:  Dr. Martinez [facility] hospitalist


   Returned Call:  1715


Additional Comments:


I reviewed the patient's case with him.  He will evaluate the patient for 

further management.





Impression





 Primary Impression:  


 Cardiogenic shock


 Additional Impression:  


 CHF (congestive heart failure)





Critical Care


I have personally spent 35 minutes of critical care time in the direct 

management of this patient.  This includes bedside care, interpretation of 

diagnostic studies, and testing, discussion with consultants, patient, and 

family members, and other required patient management activities.  This 35 

minutes is in excess of all separately billable procedures.





Scribe Attestation


The scribe's documentation has been prepared under my direction and personally 

reviewed by me in its entirety. I confirm that the note above accurately 

reflects all work, treatment, procedures, and medical decision making performed 

by me.





Departure Information


Dispostion


Being Evaluated By Hospitalist





Referrals


Liliana Talavera M.D. (PCP)





Patient Instructions


[facility]





Problem Qualifiers








 Additional Impression:  


 CHF (congestive heart failure)


 Heart failure type:  unspecified  Heart failure chronicity:  unspecified  

Qualified Codes:  I50.9 - Heart failure, unspecified

## 2018-03-20 VITALS — OXYGEN SATURATION: 85 %

## 2018-03-20 VITALS — OXYGEN SATURATION: 95 %

## 2018-03-20 RX ADMIN — MORPHINE SULFATE PRN MG: 2 INJECTION, SOLUTION INTRAMUSCULAR; INTRAVENOUS at 09:14

## 2018-03-20 NOTE — PROGRESS NOTE
Subjective


Date of Service:


Mar 20, 2018.


Subjective


Pt evaluation today including:  conversation w/ patient, physical exam, chart 

review, lab review, review of inpatient medication list


Pain:  Appears to be controlled


Patient is following simple commands but pretty much unable to answer questions





Problem List


Medical Problems:


(1) Cardiogenic shock


Status: Acute  





(2) CHF (congestive heart failure)


Status: Acute  





(3) CHF exacerbation


Status: Acute  





(4) Elevated troponin


Status: Acute  








Review of Systems


Due to patient mental status review of system was unobtainable/unreliable


We'll attempt to obtain review of system as needed from staff and family





Medications





Current Inpatient Medications








 Medications


  (Trade)  Dose


 Ordered  Sig/Rudy


 Route  Start Time


 Stop Time Status Last Admin


Dose Admin


 


 Bumetanide 2 mg/


 Syringe  8 ml @ 4


 mls/min  DAILY@09,17


 IV  3/20/18 09:00


 4/19/18 08:59  3/20/18 09:12


4 MLS/MIN


 


 Ondansetron HCl


  (Zofran Inj)  4 mg  Q6H  PRN


 IV  3/19/18 18:00


 4/18/18 17:59   


 


 


 Morphine Sulfate


  (MoRPHine


 SULFATE INJ)  2 mg  Q4H  PRN


 IV  3/19/18 18:00


 4/2/18 17:59  3/20/18 09:14


2 MG


 


 Morphine Sulfate


  (MoRPHine


 SULFATE INJ)  4 mg  Q4H  PRN


 IV  3/19/18 18:00


 4/2/18 17:59   


 


 


 Lorazepam


  (Ativan Inj)  0.5 mg  Q4H  PRN


 IV  3/19/18 18:00


 4/18/18 17:59   


 


 


 Lorazepam


  (Ativan Inj)  1 mg  Q4H  PRN


 IV  3/19/18 18:00


 4/18/18 17:59  3/20/18 12:28


1 MG


 


 Scopolamine


  (Transderm-Scop


 Patch)  1.5 mg  Q72H


 TD  3/19/18 20:00


 4/18/18 19:59  3/19/18 20:35


1.5 MG


 


 Miscellaneous


  (Remove


 Transderm-Scop


 Patch)  1 ea  Q72H


 N/A  3/22/18 20:00


 4/21/18 19:59   


 


 


 Miscellaneous


 Information


  (Check


 Scopolamine Patch


 Placement)  1 ea  QS


 N/A  3/20/18 00:00


 4/19/18 00:00  3/20/18 09:12


1 EA


 


 Lorazepam 1 mg/


 Syringe  1 ml @ 1


 mls/min  Q4H  PRN


 IV  3/19/18 20:00


 4/18/18 19:59  3/19/18 22:08


1 MLS/MIN


 


 Lorazepam 0.5 mg/


 Syringe  1 ml @ 1


 mls/min  Q4H  PRN


 IV  3/19/18 20:00


 4/18/18 19:59   


 











Objective


Vital Signs











  Date Time  Temp Pulse Resp B/P (MAP) Pulse Ox O2 Delivery O2 Flow Rate FiO2


 


3/20/18 09:00      Nasal Cannula 2.0 


 


3/20/18 00:00      Room Air 2.0 


 


3/19/18 22:00      Nasal Cannula 2.0 


 


3/19/18 18:59  44 15 119/60 96   


 


3/19/18 17:26  45 19 108/54 100 Room Air  


 


3/19/18 17:06  45 16 69/50 100 Room Air  


 


3/19/18 16:49  43 24  100   


 


3/19/18 16:21  33 15 80/61 100 Room Air  


 


3/19/18 16:16  39      


 


3/19/18 15:02 36.1 37 20 101/58 100 Room Air  


 


3/19/18 15:02      Nasal Cannula 2.0 











Physical Exam


General Appearance:  WD/WN, + mild distress


Eyes:  normal inspection


ENT:  normal ENT inspection


Neck:  supple


Respiratory/Chest:  + decreased breath sounds, + crackles, + rhonchi


Cardiovascular:  regular rate, rhythm, no edema, + diastolic murmur, + systolic 

murmur


Abdomen:  + distended, + tenderness


Extremities:  normal inspection


Neurologic/Psychiatric:  + pertinent finding (Followed simple command but 

unable to answer complicated question)





Laboratory Results





Last 24 Hours








Test


  3/19/18


16:13 3/19/18


19:15


 


White Blood Count 8.49 K/uL  


 


Red Blood Count 5.25 M/uL  


 


Hemoglobin 16.8 g/dL  


 


Hematocrit 50.1 %  


 


Mean Corpuscular Volume 95.4 fL  


 


Mean Corpuscular Hemoglobin 32.0 pg  


 


Mean Corpuscular Hemoglobin


Concent 33.5 g/dl 


  


 


 


Platelet Count 116 K/uL  


 


Mean Platelet Volume 11.0 fL  


 


Neutrophils (%) (Auto) 69.2 %  


 


Lymphocytes (%) (Auto) 16.6 %  


 


Monocytes (%) (Auto) 13.7 %  


 


Eosinophils (%) (Auto) 0.2 %  


 


Basophils (%) (Auto) 0.1 %  


 


Neutrophils # (Auto) 5.87 K/uL  


 


Lymphocytes # (Auto) 1.41 K/uL  


 


Monocytes # (Auto) 1.16 K/uL  


 


Eosinophils # (Auto) 0.02 K/uL  


 


Basophils # (Auto) 0.01 K/uL  


 


RDW Standard Deviation 60.8 fL  


 


RDW Coefficient of Variation 17.4 %  


 


Immature Granulocyte % (Auto) 0.2 %  


 


Immature Granulocyte # (Auto) 0.02 K/uL  


 


Sodium Level 133 mmol/L  


 


Potassium Level 4.8 mmol/L  


 


Chloride Level 97 mmol/L  


 


Carbon Dioxide Level 27 mmol/L  


 


Anion Gap 9.0 mmol/L  


 


Blood Urea Nitrogen 57 mg/dl  


 


Creatinine 1.95 mg/dl  


 


Est Creatinine Clear Calc


Drug Dose 28.3 ml/min 


  


 


 


Estimated GFR (


American) 34.8 


  


 


 


Estimated GFR (Non-


American 30.0 


  


 


 


BUN/Creatinine Ratio 29.1  


 


Random Glucose 107 mg/dl  


 


Calcium Level 11.1 mg/dl  


 


Total Bilirubin 2.8 mg/dl  


 


Direct Bilirubin  mg/dl  


 


Aspartate Amino Transf


(AST/SGOT) 41 U/L 


  


 


 


Alanine Aminotransferase


(ALT/SGPT) 41 U/L 


  


 


 


Alkaline Phosphatase 113 U/L  


 


Troponin I 0.332 ng/ml  


 


Total Protein 6.4 gm/dl  


 


Albumin 3.0 gm/dl  


 


Chemistry Specimen Hemolysis   


 


Urine Color  YELLOW 


 


Urine Appearance  CLEAR 


 


Urine pH  6.0 


 


Urine Specific Gravity  1.011 


 


Urine Protein  NEG 


 


Urine Glucose (UA)  NEG 


 


Urine Ketones  NEG 


 


Urine Occult Blood  2+ 


 


Urine Nitrite  NEG 


 


Urine Bilirubin  NEG 


 


Urine Urobilinogen  NEG 


 


Urine Leukocyte Esterase  NEG 


 


Urine WBC (Auto)  1-5 /hpf 


 


Urine RBC (Auto)  >30 /hpf 


 


Urine Hyaline Casts (Auto)  1-5 /lpf 


 


Urine Epithelial Cells (Auto)  5-10 /lpf 


 


Urine Bacteria (Auto)  NEG 











Assessment and Plan


87-year-old man with complicated past medical including anasarca and pleural 

effusion, diastolic congestive heart failure, metabolic encephalopathy and 

severe deconditioning


Recently discharged from our facility to rehab.  Unfortunately appears to have 

his pleural effusion rapidly reaccumulating.  Also has anasarca secondary to 

diastolic congestive heart failure.





Assessment


Acute on chronic diastolic congestive heart failure


Massive pleural effusion/anasarca


Severe deconditioning


Metabolic encephalopathy secondary to all of the above





Plan


Patient seen by palliative care team.


Family requested comfort care as per patient's wishes.


Patient appears to be comfortable.


We will not order any further labs.


Patient is DNR/DNI.





Likely unfortunately will not qualify for inpatient

## 2018-03-20 NOTE — PALLIATIVE CARE CONSULTATION
Consultation


Date of Consultation:


Mar 20, 2018.


Requesting Physician:


Dr. Martinez


Attending Physician:


Dr. Ann Real


Reason for Consultation:


Comfort measures


History of Present Illness


This 87 year old male patient with PMH pleural effusions, diastolic heart 

failure, pericardial effusion without tamponade, afib and aflutter refusing a 

pacemaker insertion, and others listed below, presented to the hospital last 

evening with c/o increased lower extremity edema, failure to thrive, and had an 

irregular bradycardic heart rhythm. Patient did respond to some atropine and 

was able to voice his wishes which are to be made comfortable and seek no 

further treatment. Patient was recently in the hospital from February 14-24 

with CHF exacerbation, pericardial effusion without tamponade, pleural effusions

, aflutter/afib, cellulitis, DISHA on CKD, and hypercalcemia. He was discharged 

to [facility] and eventually was discharged to home. Per the family, patient 

has been rapidly declining since then. His family, including his wife, were at 

bedside last evening when patient made the decision to pursue comfort measures 

only and they are supportive. Patient admitted to [facility], palliative care 

consulted.





I met with the patient and his son this morning in room 422 along with ANN-MARIE Dugan. Patient is obtunded, did stir a little when I spoke his name 

but he was unable to open eyes or verbally respond to me. He did not follow 

commands. Son at bedside stated that his mother (patient's wife) went home 

early this morning to rest as she was here all night. Family is all in 

agreement that comfort is the goal. Son has seen no signs of discomfort and is 

having frequent periods of apnea, primary RN in room agreed. Patient has PRN IV 

morphine and lorazepam ordered for comfort. Only received one dose of the 

morphine since admission. Son denies any further questions/concerns at this 

time. Support and education given.





Past Medical/Surgical History


Medical History:


Atrial fibrillation


Cellulitis


CHF


CHF exacerbation


Diverticulosis


GI bleed


Hyperlipidemia


Hypertension


Peritonitis


Pseudomonal Pneumonia


Sepsis


Viral Encephalitis





Social History


Smoking Status:  Never Smoker


History of Alcohol Use:  No


Drug Use:  none


Marital Status:  


Housing Status:  lives with family


Occupation Status:  retired





Review of Systems


unable to obtain due to altered mental status





Allergies


Coded Allergies:  


     No Known Allergies (Verified , 3/19/18)





Medications





Current Inpatient Medications








 Medications


  (Trade)  Dose


 Ordered  Sig/Rudy


 Route  Start Time


 Stop Time Status Last Admin


Dose Admin


 


 Bumetanide 2 mg/


 Syringe  8 ml @ 4


 mls/min  DAILY@09,17


 IV  3/20/18 09:00


 4/19/18 08:59   


 


 


 Ondansetron HCl


  (Zofran Inj)  4 mg  Q6H  PRN


 IV  3/19/18 18:00


 4/18/18 17:59   


 


 


 Morphine Sulfate


  (MoRPHine


 SULFATE INJ)  2 mg  Q4H  PRN


 IV  3/19/18 18:00


 4/2/18 17:59  3/19/18 21:08


2 MG


 


 Morphine Sulfate


  (MoRPHine


 SULFATE INJ)  4 mg  Q4H  PRN


 IV  3/19/18 18:00


 4/2/18 17:59   


 


 


 Lorazepam


  (Ativan Inj)  0.5 mg  Q4H  PRN


 IV  3/19/18 18:00


 4/18/18 17:59   


 


 


 Lorazepam


  (Ativan Inj)  1 mg  Q4H  PRN


 IV  3/19/18 18:00


 4/18/18 17:59   


 


 


 Scopolamine


  (Transderm-Scop


 Patch)  1.5 mg  Q72H


 TD  3/19/18 20:00


 4/18/18 19:59  3/19/18 20:35


1.5 MG


 


 Miscellaneous


  (Remove


 Transderm-Scop


 Patch)  1 ea  Q72H


 N/A  3/22/18 20:00


 4/21/18 19:59   


 


 


 Miscellaneous


 Information


  (Check


 Scopolamine Patch


 Placement)  1 ea  QS


 N/A  3/20/18 00:00


 4/19/18 00:00  3/20/18 07:18


1 EA


 


 Lorazepam 1 mg/


 Syringe  1 ml @ 1


 mls/min  Q4H  PRN


 IV  3/19/18 20:00


 4/18/18 19:59  3/19/18 22:08


1 MLS/MIN


 


 Lorazepam 0.5 mg/


 Syringe  1 ml @ 1


 mls/min  Q4H  PRN


 IV  3/19/18 20:00


 4/18/18 19:59   


 











Physical Exam











  Date Time  Temp Pulse Resp B/P (MAP) Pulse Ox O2 Delivery O2 Flow Rate FiO2


 


3/20/18 00:00      Room Air 2.0 


 


3/19/18 22:00      Nasal Cannula 2.0 


 


3/19/18 18:59  44 15 119/60 96   


 


3/19/18 17:26  45 19 108/54 100 Room Air  


 


3/19/18 17:06  45 16 69/50 100 Room Air  


 


3/19/18 16:49  43 24  100   


 


3/19/18 16:21  33 15 80/61 100 Room Air  


 


3/19/18 16:16  39      


 


3/19/18 15:02 36.1 37 20 101/58 100 Room Air  


 


3/19/18 15:02      Nasal Cannula 2.0 








General Appearance:  no apparent distress


ENT:  + pertinent finding (no secretions noted)


Neck:  no JVD


Respiratory:  lungs clear, no respiratory distress, no accessory muscle use


Cardiovascular:  regular rate, rhythm, + normal peripheral pulses, + pertinent 

finding (+3-4 pitting edema to BLE)


Abdomen:  normal bowel sounds, soft


Neurologic/Psychiatric:  + pertinent finding (obtunded)


Skin:  normal color


tea colored urine from Richard





Laboratory Results





Last 24 Hours








Test


  3/19/18


16:13 3/19/18


19:15


 


White Blood Count 8.49 K/uL  


 


Red Blood Count 5.25 M/uL  


 


Hemoglobin 16.8 g/dL  


 


Hematocrit 50.1 %  


 


Mean Corpuscular Volume 95.4 fL  


 


Mean Corpuscular Hemoglobin 32.0 pg  


 


Mean Corpuscular Hemoglobin


Concent 33.5 g/dl 


  


 


 


Platelet Count 116 K/uL  


 


Mean Platelet Volume 11.0 fL  


 


Neutrophils (%) (Auto) 69.2 %  


 


Lymphocytes (%) (Auto) 16.6 %  


 


Monocytes (%) (Auto) 13.7 %  


 


Eosinophils (%) (Auto) 0.2 %  


 


Basophils (%) (Auto) 0.1 %  


 


Neutrophils # (Auto) 5.87 K/uL  


 


Lymphocytes # (Auto) 1.41 K/uL  


 


Monocytes # (Auto) 1.16 K/uL  


 


Eosinophils # (Auto) 0.02 K/uL  


 


Basophils # (Auto) 0.01 K/uL  


 


RDW Standard Deviation 60.8 fL  


 


RDW Coefficient of Variation 17.4 %  


 


Immature Granulocyte % (Auto) 0.2 %  


 


Immature Granulocyte # (Auto) 0.02 K/uL  


 


Sodium Level 133 mmol/L  


 


Potassium Level 4.8 mmol/L  


 


Chloride Level 97 mmol/L  


 


Carbon Dioxide Level 27 mmol/L  


 


Anion Gap 9.0 mmol/L  


 


Blood Urea Nitrogen 57 mg/dl  


 


Creatinine 1.95 mg/dl  


 


Est Creatinine Clear Calc


Drug Dose 28.3 ml/min 


  


 


 


Estimated GFR (


American) 34.8 


  


 


 


Estimated GFR (Non-


American 30.0 


  


 


 


BUN/Creatinine Ratio 29.1  


 


Random Glucose 107 mg/dl  


 


Calcium Level 11.1 mg/dl  


 


Total Bilirubin 2.8 mg/dl  


 


Direct Bilirubin  mg/dl  


 


Aspartate Amino Transf


(AST/SGOT) 41 U/L 


  


 


 


Alanine Aminotransferase


(ALT/SGPT) 41 U/L 


  


 


 


Alkaline Phosphatase 113 U/L  


 


Troponin I 0.332 ng/ml  


 


Total Protein 6.4 gm/dl  


 


Albumin 3.0 gm/dl  


 


Chemistry Specimen Hemolysis   


 


Urine Color  YELLOW 


 


Urine Appearance  CLEAR 


 


Urine pH  6.0 


 


Urine Specific Gravity  1.011 


 


Urine Protein  NEG 


 


Urine Glucose (UA)  NEG 


 


Urine Ketones  NEG 


 


Urine Occult Blood  2+ 


 


Urine Nitrite  NEG 


 


Urine Bilirubin  NEG 


 


Urine Urobilinogen  NEG 


 


Urine Leukocyte Esterase  NEG 


 


Urine WBC (Auto)  1-5 /hpf 


 


Urine RBC (Auto)  >30 /hpf 


 


Urine Hyaline Casts (Auto)  1-5 /lpf 


 


Urine Epithelial Cells (Auto)  5-10 /lpf 


 


Urine Bacteria (Auto)  NEG 











Assessment & Plan


Palliative Performance Scale:  10 %





Problem list:


SOB


Obtundation


Diastolic CHF


Comfort measures only





Palliative care recs:


-Patient is DNR.


-Comfort measures only per patient and family's wishes.


-Son at bedside at this time, support and education given to him. Wife went 

home to get some rest.


-Patient requiring one dose of IV morphine and one dose of IV Ativan since 

admission. I am uncertain at this time if he qualifies for [facility] hospice. However

, I also do not think he is stable for transfer out of the facility. He is 

having periods of apnea and is obtunded.


-I would discontinue IV Bumex. Patient does not sound moist/wet, no secretions 

noted.


-Continue other comfort medications as ordered.





Thank you kindly for this consult. I will follow as needed.











Total time spent 50 minutes with >50% of time spent at bedside with patient and 

family discussing comfort measures and EOL issues.

## 2018-03-21 VITALS — OXYGEN SATURATION: 95 %

## 2018-03-21 RX ADMIN — MORPHINE SULFATE PRN MG: 20 SOLUTION ORAL at 16:40

## 2018-03-21 RX ADMIN — LORAZEPAM PRN MG: 1 TABLET ORAL at 10:09

## 2018-03-21 RX ADMIN — MORPHINE SULFATE PRN MG: 20 SOLUTION ORAL at 09:09

## 2018-03-21 RX ADMIN — MORPHINE SULFATE PRN MG: 20 SOLUTION ORAL at 08:07

## 2018-03-21 NOTE — PROGRESS NOTE
Subjective


Date of Service:


Mar 21, 2018.


Subjective


pt is obtunded





cannot obtain ROS





family at bedside





Problem List


Medical Problems:


(1) Cardiogenic shock


Status: Acute  





(2) CHF (congestive heart failure)


Status: Acute  





(3) CHF exacerbation


Status: Acute  





(4) Elevated troponin


Status: Acute  











Objective


Vital Signs











  Date Time  Temp Pulse Resp B/P (MAP) Pulse Ox O2 Delivery O2 Flow Rate FiO2


 


3/21/18 00:00     95 Nasal Cannula 2.0 


 


3/20/18 18:00     95 Nasal Cannula 2.0 


 


3/20/18 17:30      Nasal Cannula 2.0 


 


3/20/18 17:30     85 Room Air  


 


3/20/18 16:00      Room Air  


 


3/20/18 09:00      Nasal Cannula 2.0 











Physical Exam


General Appearance:  WD/WN, + moderate distress


Respiratory/Chest:  + respiratory distress, + decreased breath sounds


Cardiovascular:  + JVD, + bradycardia


Abdomen:  normal bowel sounds, non tender, soft





Assessment and Plan


87-year-old male who presents with recurrent diastolic heart failure with 

request for palliative care/comfort care services





Recent discharge from our facility after having thoracentesis for pleural 

effusions felt to be secondary to heart failure has a long-standing history of 

atrial fib flutter on chronic anticoagulation.  Patient currently has had what 

appears to be reaccumulation of his pleural effusion on chest x-ray and massive 

lower body swelling and leg swelling.  





We continue to support his wishes to pursue comfort care measures with the 

expectation he will likely  from his illnesses





 palliative care consult  scopolamine intravenous morphine intravenous Ativan 

and place a Richard catheter to avoid urgency for urination





We will forego DVT prevention as the patient wishes to be on palliative care 

stopping his Eliquis and not instituting any subcutaneous chemoprophylaxis nor 

mechanical prophylaxis because of the discomfort and uncomfortable nature of 

SCDs for this patient





As mentioned above due to his BPH we will stop his Flomax and Proscar but 

placed a Richard catheter





His hypercalcemia not be managed with intravenous fluids because of his volume 

status

## 2018-03-22 RX ADMIN — MORPHINE SULFATE PRN MG: 20 SOLUTION ORAL at 15:35

## 2018-03-22 RX ADMIN — MORPHINE SULFATE PRN MG: 20 SOLUTION ORAL at 04:23

## 2018-03-22 RX ADMIN — MORPHINE SULFATE PRN MG: 20 SOLUTION ORAL at 11:41

## 2018-03-22 RX ADMIN — ATROPINE SULFATE PRN DROPS: 10 SOLUTION/ DROPS OPHTHALMIC at 13:47

## 2018-03-22 RX ADMIN — ATROPINE SULFATE PRN DROPS: 10 SOLUTION/ DROPS OPHTHALMIC at 15:07

## 2018-03-22 RX ADMIN — LORAZEPAM PRN MG: 1 TABLET ORAL at 06:25

## 2018-03-22 RX ADMIN — LORAZEPAM PRN MG: 1 TABLET ORAL at 13:47

## 2018-03-22 RX ADMIN — LORAZEPAM PRN MG: 1 TABLET ORAL at 00:12

## 2018-03-22 NOTE — DEATH SUMMARY
Summary of Death


Admission Date


Mar 19, 2018 at 17:58





Date & Time of Death


Mar 22, 2018.


1610





Cause of Death


comfort care, bradycardia, and congestive heart failure





Hospital Course


87-year-old male who presents with recurrent diastolic heart failure with 

request for palliative care/comfort care services, pt succumbed to his illness 

at 1610, surrounded by wife and family





previously in the hospital stay:





Recent discharge from our facility after having thoracentesis for pleural 

effusions felt to be secondary to heart failure has a long-standing history of 

atrial fib flutter on chronic anticoagulation.  Patient currently has had what 

appears to be reaccumulation of his pleural effusion on chest x-ray and massive 

lower body swelling and leg swelling.  





We continue to support his wishes to pursue comfort care measures with the 

expectation he will likely  from his illnesses





 palliative care consult  scopolamine intravenous morphine intravenous Ativan 

and place a Richard catheter to avoid urgency for urination





We will forego DVT prevention as the patient wishes to be on palliative care 

stopping his Eliquis and not instituting any subcutaneous chemoprophylaxis nor 

mechanical prophylaxis because of the discomfort and uncomfortable nature of 

SCDs for this patient





As mentioned above due to his BPH we will stop his Flomax and Proscar but 

placed a Richard catheter





His hypercalcemia not be managed with intravenous fluids because of his volume 

status